# Patient Record
Sex: MALE | Race: BLACK OR AFRICAN AMERICAN | Employment: FULL TIME | URBAN - METROPOLITAN AREA
[De-identification: names, ages, dates, MRNs, and addresses within clinical notes are randomized per-mention and may not be internally consistent; named-entity substitution may affect disease eponyms.]

---

## 2019-04-06 ENCOUNTER — HOSPITAL ENCOUNTER (EMERGENCY)
Facility: HOSPITAL | Age: 54
Discharge: HOME | End: 2019-04-06
Attending: EMERGENCY MEDICINE
Payer: COMMERCIAL

## 2019-04-06 VITALS
SYSTOLIC BLOOD PRESSURE: 169 MMHG | HEIGHT: 73 IN | RESPIRATION RATE: 19 BRPM | HEART RATE: 79 BPM | OXYGEN SATURATION: 99 % | DIASTOLIC BLOOD PRESSURE: 105 MMHG | BODY MASS INDEX: 35.78 KG/M2 | TEMPERATURE: 98 F | WEIGHT: 270 LBS

## 2019-04-06 DIAGNOSIS — H11.31 SUBCONJUNCTIVAL HEMORRHAGE OF RIGHT EYE: Primary | ICD-10-CM

## 2019-04-06 DIAGNOSIS — I10 UNCONTROLLED HYPERTENSION: ICD-10-CM

## 2019-04-06 PROCEDURE — 93010 ELECTROCARDIOGRAM REPORT: CPT | Performed by: EMERGENCY MEDICINE

## 2019-04-06 PROCEDURE — 85025 COMPLETE CBC W/AUTO DIFF WBC: CPT | Performed by: EMERGENCY MEDICINE

## 2019-04-06 PROCEDURE — 80048 BASIC METABOLIC PNL TOTAL CA: CPT | Performed by: EMERGENCY MEDICINE

## 2019-04-06 PROCEDURE — 99284 EMERGENCY DEPT VISIT MOD MDM: CPT

## 2019-04-06 PROCEDURE — 96375 TX/PRO/DX INJ NEW DRUG ADDON: CPT

## 2019-04-06 PROCEDURE — 93005 ELECTROCARDIOGRAM TRACING: CPT

## 2019-04-06 PROCEDURE — 96374 THER/PROPH/DIAG INJ IV PUSH: CPT

## 2019-04-06 RX ORDER — LABETALOL HYDROCHLORIDE 5 MG/ML
20 INJECTION, SOLUTION INTRAVENOUS ONCE
Status: COMPLETED | OUTPATIENT
Start: 2019-04-06 | End: 2019-04-06

## 2019-04-06 RX ORDER — LISINOPRIL 40 MG/1
40 TABLET ORAL DAILY
Status: ON HOLD | COMMUNITY
End: 2020-02-14

## 2019-04-06 RX ORDER — HYDRALAZINE HYDROCHLORIDE 20 MG/ML
20 INJECTION INTRAMUSCULAR; INTRAVENOUS ONCE
Status: COMPLETED | OUTPATIENT
Start: 2019-04-06 | End: 2019-04-06

## 2019-04-06 RX ORDER — HYDROCHLOROTHIAZIDE 25 MG/1
25 TABLET ORAL DAILY
Qty: 30 TABLET | Refills: 0 | Status: ON HOLD | OUTPATIENT
Start: 2019-04-06 | End: 2019-12-02

## 2019-04-06 NOTE — ED PROVIDER NOTES
Patient Seen in: [facility] AND [facility] Emergency Department    History   Patient presents with: Eye Visual Problem (opthalmic)    Stated Complaint: R eye is red     HPI    The patient is a 12-year-old male who presents to redness to his right eye.   He denie Mouth/Throat: Oropharynx is clear and moist.   Eyes: Pupils are equal, round, and reactive to light. EOM and lids are normal. Right eye exhibits no chemosis. Right conjunctiva has a hemorrhage. Neck: Normal range of motion. Neck supple. No JVD present. DIFFERENTIAL[[id number]]          Abnormal            Final result                 Please view results for these tests on the individual orders.    RAINBOW DRAW BLUE   RAINBOW DRAW LAVENDER   RAINBOW DRAW LIGHT GREEN   RAINBOW DRAW GOLD     EKG    Rate, inte

## 2019-04-06 NOTE — ED NOTES
Patient came in today with right eye bloodshot. Patients blood pressure elevated. Patient stated he was upset yesterday but his eye was fine, and woke up this morning with being blood shot.  Patient prescribed lisinopril, but states he does not take it regu

## 2019-11-29 ENCOUNTER — APPOINTMENT (OUTPATIENT)
Dept: GENERAL RADIOLOGY | Facility: HOSPITAL | Age: 54
DRG: 304 | End: 2019-11-29
Attending: EMERGENCY MEDICINE
Payer: COMMERCIAL

## 2019-11-29 ENCOUNTER — HOSPITAL ENCOUNTER (INPATIENT)
Facility: HOSPITAL | Age: 54
LOS: 3 days | Discharge: HOME | DRG: 304 | End: 2019-12-02
Attending: EMERGENCY MEDICINE | Admitting: HOSPITALIST
Payer: COMMERCIAL

## 2019-11-29 ENCOUNTER — APPOINTMENT (OUTPATIENT)
Dept: CT IMAGING | Facility: HOSPITAL | Age: 54
DRG: 304 | End: 2019-11-29
Attending: EMERGENCY MEDICINE
Payer: COMMERCIAL

## 2019-11-29 ENCOUNTER — APPOINTMENT (OUTPATIENT)
Dept: ULTRASOUND IMAGING | Facility: HOSPITAL | Age: 54
DRG: 304 | End: 2019-11-29
Attending: Other
Payer: COMMERCIAL

## 2019-11-29 ENCOUNTER — APPOINTMENT (OUTPATIENT)
Dept: CV DIAGNOSTICS | Facility: HOSPITAL | Age: 54
DRG: 304 | End: 2019-11-29
Attending: Other
Payer: COMMERCIAL

## 2019-11-29 ENCOUNTER — APPOINTMENT (OUTPATIENT)
Dept: MRI IMAGING | Facility: HOSPITAL | Age: 54
DRG: 304 | End: 2019-11-29
Attending: EMERGENCY MEDICINE
Payer: COMMERCIAL

## 2019-11-29 DIAGNOSIS — I63.9 ACUTE CVA (CEREBROVASCULAR ACCIDENT) (HCC): Primary | ICD-10-CM

## 2019-11-29 DIAGNOSIS — I16.1 HYPERTENSIVE EMERGENCY: ICD-10-CM

## 2019-11-29 DIAGNOSIS — I21.4 NON-STEMI (NON-ST ELEVATED MYOCARDIAL INFARCTION) (HCC): ICD-10-CM

## 2019-11-29 PROBLEM — R73.9 HYPERGLYCEMIA: Status: ACTIVE | Noted: 2019-11-29

## 2019-11-29 PROBLEM — D69.6 THROMBOCYTOPENIA (HCC): Status: ACTIVE | Noted: 2019-11-29

## 2019-11-29 PROCEDURE — 71260 CT THORAX DX C+: CPT | Performed by: EMERGENCY MEDICINE

## 2019-11-29 PROCEDURE — 93880 EXTRACRANIAL BILAT STUDY: CPT | Performed by: OTHER

## 2019-11-29 PROCEDURE — 93306 TTE W/DOPPLER COMPLETE: CPT | Performed by: OTHER

## 2019-11-29 PROCEDURE — 71045 X-RAY EXAM CHEST 1 VIEW: CPT | Performed by: EMERGENCY MEDICINE

## 2019-11-29 PROCEDURE — 70450 CT HEAD/BRAIN W/O DYE: CPT | Performed by: EMERGENCY MEDICINE

## 2019-11-29 PROCEDURE — 99223 1ST HOSP IP/OBS HIGH 75: CPT | Performed by: HOSPITALIST

## 2019-11-29 PROCEDURE — 99253 IP/OBS CNSLTJ NEW/EST LOW 45: CPT | Performed by: OTHER

## 2019-11-29 PROCEDURE — 70551 MRI BRAIN STEM W/O DYE: CPT | Performed by: EMERGENCY MEDICINE

## 2019-11-29 RX ORDER — HEPARIN SODIUM 5000 [USP'U]/ML
5000 INJECTION, SOLUTION INTRAVENOUS; SUBCUTANEOUS EVERY 12 HOURS SCHEDULED
Status: DISCONTINUED | OUTPATIENT
Start: 2019-11-29 | End: 2019-12-02

## 2019-11-29 RX ORDER — NITROGLYCERIN 0.4 MG/1
0.4 TABLET SUBLINGUAL EVERY 5 MIN PRN
Status: DISCONTINUED | OUTPATIENT
Start: 2019-11-29 | End: 2019-12-02

## 2019-11-29 RX ORDER — SENNOSIDES 8.6 MG
17.2 TABLET ORAL NIGHTLY
Status: DISCONTINUED | OUTPATIENT
Start: 2019-11-29 | End: 2019-11-29

## 2019-11-29 RX ORDER — METOCLOPRAMIDE HYDROCHLORIDE 5 MG/ML
10 INJECTION INTRAMUSCULAR; INTRAVENOUS EVERY 8 HOURS PRN
Status: DISCONTINUED | OUTPATIENT
Start: 2019-11-29 | End: 2019-12-02

## 2019-11-29 RX ORDER — SODIUM CHLORIDE 9 MG/ML
INJECTION, SOLUTION INTRAVENOUS CONTINUOUS
Status: DISCONTINUED | OUTPATIENT
Start: 2019-11-29 | End: 2019-11-29

## 2019-11-29 RX ORDER — ACETAMINOPHEN 650 MG/1
650 SUPPOSITORY RECTAL EVERY 4 HOURS PRN
Status: DISCONTINUED | OUTPATIENT
Start: 2019-11-29 | End: 2019-12-02

## 2019-11-29 RX ORDER — ONDANSETRON 2 MG/ML
4 INJECTION INTRAMUSCULAR; INTRAVENOUS EVERY 6 HOURS PRN
Status: DISCONTINUED | OUTPATIENT
Start: 2019-11-29 | End: 2019-12-02

## 2019-11-29 RX ORDER — HYDRALAZINE HYDROCHLORIDE 20 MG/ML
5 INJECTION INTRAMUSCULAR; INTRAVENOUS ONCE
Status: COMPLETED | OUTPATIENT
Start: 2019-11-29 | End: 2019-11-29

## 2019-11-29 RX ORDER — SODIUM CHLORIDE 0.9 % (FLUSH) 0.9 %
3 SYRINGE (ML) INJECTION AS NEEDED
Status: DISCONTINUED | OUTPATIENT
Start: 2019-11-29 | End: 2019-12-02

## 2019-11-29 RX ORDER — HYDROCODONE BITARTRATE AND ACETAMINOPHEN 5; 325 MG/1; MG/1
2 TABLET ORAL EVERY 4 HOURS PRN
Status: DISCONTINUED | OUTPATIENT
Start: 2019-11-29 | End: 2019-12-02

## 2019-11-29 RX ORDER — ASPIRIN 81 MG/1
81 TABLET, CHEWABLE ORAL DAILY
Status: DISCONTINUED | OUTPATIENT
Start: 2019-11-29 | End: 2019-12-02

## 2019-11-29 RX ORDER — LABETALOL 100 MG/1
100 TABLET, FILM COATED ORAL EVERY 12 HOURS SCHEDULED
Status: DISCONTINUED | OUTPATIENT
Start: 2019-11-29 | End: 2019-11-30

## 2019-11-29 RX ORDER — LABETALOL HYDROCHLORIDE 5 MG/ML
10 INJECTION, SOLUTION INTRAVENOUS EVERY 10 MIN PRN
Status: COMPLETED | OUTPATIENT
Start: 2019-11-29 | End: 2019-11-29

## 2019-11-29 RX ORDER — CLOPIDOGREL BISULFATE 75 MG/1
75 TABLET ORAL DAILY
Status: DISCONTINUED | OUTPATIENT
Start: 2019-11-29 | End: 2019-12-02

## 2019-11-29 RX ORDER — LISINOPRIL 40 MG/1
40 TABLET ORAL DAILY
Status: DISCONTINUED | OUTPATIENT
Start: 2019-11-29 | End: 2019-12-02

## 2019-11-29 RX ORDER — LISINOPRIL 40 MG/1
40 TABLET ORAL DAILY
Status: DISCONTINUED | OUTPATIENT
Start: 2019-11-29 | End: 2019-11-29

## 2019-11-29 RX ORDER — ACETAMINOPHEN 325 MG/1
650 TABLET ORAL EVERY 4 HOURS PRN
Status: DISCONTINUED | OUTPATIENT
Start: 2019-11-29 | End: 2019-12-02

## 2019-11-29 RX ORDER — NITROGLYCERIN 0.4 MG/1
TABLET SUBLINGUAL
Status: COMPLETED
Start: 2019-11-29 | End: 2019-11-29

## 2019-11-29 RX ORDER — ASPIRIN 81 MG/1
324 TABLET, CHEWABLE ORAL ONCE
Status: COMPLETED | OUTPATIENT
Start: 2019-11-29 | End: 2019-11-29

## 2019-11-29 RX ORDER — METOPROLOL TARTRATE 5 MG/5ML
5 INJECTION INTRAVENOUS EVERY 6 HOURS
Status: DISCONTINUED | OUTPATIENT
Start: 2019-11-29 | End: 2019-11-29

## 2019-11-29 RX ORDER — HYDRALAZINE HYDROCHLORIDE 20 MG/ML
10 INJECTION INTRAMUSCULAR; INTRAVENOUS EVERY 4 HOURS PRN
Status: DISCONTINUED | OUTPATIENT
Start: 2019-11-29 | End: 2019-12-02

## 2019-11-29 RX ORDER — HYDROCODONE BITARTRATE AND ACETAMINOPHEN 5; 325 MG/1; MG/1
1 TABLET ORAL EVERY 4 HOURS PRN
Status: DISCONTINUED | OUTPATIENT
Start: 2019-11-29 | End: 2019-12-02

## 2019-11-29 RX ORDER — ATORVASTATIN CALCIUM 40 MG/1
40 TABLET, FILM COATED ORAL NIGHTLY
Status: DISCONTINUED | OUTPATIENT
Start: 2019-11-29 | End: 2019-12-02

## 2019-11-29 NOTE — CONSULTS
[facility]    Report of Consultation    Elina James Patient Status:  Emergency    3/8/1965 MRN [id number]   Location [address] Attending Riki Brannon MD   Hosp Day # 0 PCP No primary care provider on file and 160/137. Patient normally was not checking his blood pressures at home. The patient generally does not have chest pain or shortness of breath and these are transient symptoms that did resolve. EKG showed no acute findings.   Troponin is borderline el BS-present. Extremities:  Without clubbing, cyanosis or edema. Peripheral pulses are 2+. Neurologic:  Alert and oriented, normal affect. No motor or coordinational deficit. Skin:  Warm and dry.      Results:     Laboratory Data:        Lab Results   Com for patient's reported age. Therefore, correlate for underlying risk factors such as hypertension, dyslipidemia, or diabetes. Alternatively, consider vasculitis. 3. Punctate chronic microhemorrhage in the left periatrial white matter.  4. Lesser incidenta

## 2019-11-29 NOTE — ED INITIAL ASSESSMENT (HPI)
Pt reports tingling to R side of body and dyspnea with exertion that began last night. Also reports CP.  Recent flight from [facility]

## 2019-11-29 NOTE — SLP NOTE
SLP orders received and acknowledged. RN reports pt passed swallow screen and denies any swallowing difficulties. Per patient interview, pt denies  any difficulties swallowing and refuses swallow evaluation at this time.  Pt states, \"Scoot, scoot out of he

## 2019-11-29 NOTE — ED PROVIDER NOTES
Patient Seen in: [facility] AND [facility] Emergency Department      History   Patient presents with:  Numbness Weakness (neurologic)  Chest Pain Angina (cardiovascular)  Dyspnea MAUREEN SOB (respiratory)    Stated Complaint: tingling to right side/SOB    HPI    Thor Nur Musculoskeletal: Normal range of motion and neck supple. Cardiovascular:      Rate and Rhythm: Normal rate and regular rhythm. Heart sounds: No murmur. Pulmonary:      Effort: Pulmonary effort is normal. No respiratory distress.       Breath sounds following orders were created for panel order CBC WITH DIFFERENTIAL WITH PLATELET.   Procedure                               Abnormality         Status                     ---------                               -----------         ------ the left parietal white matter/corona radiata. 2. Advanced presumed sequelae of chronic microangiopathy involving both cerebral hemispheres with additional chronic lacunar infarcts involving the bianca and right greater than left cerebellum.   These findings List                   Present on Admission           ICD-10-CM Noted POA    Acute CVA (cerebrovascular accident) ([facility] [address] I63.9 11/29/2019 Unknown    Hyperglycemia R73.9 11/29/2019 Yes    Thrombocyt[facility] D69.6 11/29/2019 Yes

## 2019-11-29 NOTE — CONSULTS
[facility] HOSP - [facility]    Report of Consultation    Matt Palacio Patient Status:  Emergency    3/8/1965 MRN [id number]   Location [address] Attending Trevor Larson MD   Hosp Day # 0 PCP No primary care provider on file smell preserved, normal glutition  Neck- No masses or adenopathy  Cv: pulses were palpable and normal, no cyanosis or edema     Neurological:     Mental Status- Alert and oriented x3.   Normal attention span and concentration  Thought process intact  Memory expected for patient's age. Moderate-sized lacunar infarct within the bianca and within the right cerebellar hemisphere. Small bilateral remote appearing basal ganglia lacunar infarcts. Otherwise, no acute intracranial abnormality.   If there is high clini Dictated by (CST): Margot Pastrana MD on 11/29/2019 at 9:26     Approved by (CST): Margot Pastrana MD on 11/29/2019 at 9:33          Ekg 12-lead    Result Date: 11/29/2019  ECG Report  Interpretation  --------------------------     Ekg 12-lead    Result Da

## 2019-11-29 NOTE — CM/SW NOTE
SW received MDO for home health evaluation. Per chart review, pt is from home with spouse, is independent and works full-time. SW attempted to meet with patient 2x, pt in with ultrasound (2x).  SW placed tentative referral with [facility] - if pt requires

## 2019-11-30 PROCEDURE — 99232 SBSQ HOSP IP/OBS MODERATE 35: CPT | Performed by: OTHER

## 2019-11-30 PROCEDURE — 99233 SBSQ HOSP IP/OBS HIGH 50: CPT | Performed by: HOSPITALIST

## 2019-11-30 RX ORDER — POTASSIUM CHLORIDE 20 MEQ/1
40 TABLET, EXTENDED RELEASE ORAL EVERY 4 HOURS
Status: COMPLETED | OUTPATIENT
Start: 2019-11-30 | End: 2019-11-30

## 2019-11-30 RX ORDER — CLONIDINE HYDROCHLORIDE 0.1 MG/1
0.1 TABLET ORAL 3 TIMES DAILY
Status: DISCONTINUED | OUTPATIENT
Start: 2019-11-30 | End: 2019-12-01

## 2019-11-30 RX ORDER — LABETALOL 200 MG/1
200 TABLET, FILM COATED ORAL EVERY 12 HOURS SCHEDULED
Status: DISCONTINUED | OUTPATIENT
Start: 2019-11-30 | End: 2019-12-02

## 2019-11-30 RX ORDER — AMLODIPINE BESYLATE 5 MG/1
5 TABLET ORAL DAILY
Status: DISCONTINUED | OUTPATIENT
Start: 2019-11-30 | End: 2019-12-02

## 2019-11-30 NOTE — PLAN OF CARE
VS stable. Neuro intact, no deficits observed other than pt reports some mild numbness and tingling to the corner of right side of mouth, right fingers, and right toes. Pt ambulates independently and completes ADLs independently.   HA and CP after adminis type and severity of pain and evaluate response  - Implement non-pharmacological measures as appropriate and evaluate response  - Consider cultural and social influences on pain and pain management  - Manage/alleviate anxiety  - Utilize distraction and/or and heart rate control medications as ordered  - Initiate emergency measures for life threatening arrhythmias  - Monitor electrolytes and administer replacement therapy as ordered  Outcome: Progressing     Problem: METABOLIC/FLUID AND ELECTROLYTES - ADULT pressure  - Maintain blood pressure and fluid volume within ordered parameters to optimize cerebral perfusion and minimize risk of hemorrhage  - Monitor temperature, glucose, and sodium.  Initiate appropriate interventions as ordered  Outcome: Progressing

## 2019-11-30 NOTE — SLP NOTE
ADULT SWALLOWING EVALUATION    ASSESSMENT    ASSESSMENT/OVERALL IMPRESSION:      This BSE was ordered d/t stroke protocol. No PMH of dysphagia at [facility].        Pt alert, on room air, afebrile and assessed sitting upright in chair (after consulting with RN) - Liquid: Thin       Compensatory Strategies Recommended: No straws  Aspiration Precautions: Upright position; Slow rate;Small bites and sips; No straw  Medication Administration Recommendations: One pill at a time  Treatment Plan/Recommendations: (dysphagia Trialed: Thin liquids; Hard solid  Method of Presentation: Self presentation;Cup;Straw  Patient Positioning: Upright;Midline;Standard chair    Oral Phase of Swallow:  Within Functional Limits    Pharyngeal Phase of Swallow: Impaired  Laryngeal Elevation Rudolph

## 2019-11-30 NOTE — PROGRESS NOTES
Neurology Inpatient Follow-up Note      HPI:     Patient being seen in follow up. Interval notes and workup reviewed. Currently denying any neurologic symptoms such as numbness, weakness, vision change, or headache.       Past Medical Hisotory:  Reviewe vasculitis. 3. Punctate chronic microhemorrhage in the left periatrial white matter.     TTE  Study Conclusions  1. Left ventricle: The cavity size was normal. Wall thickness was     increased increased in a pattern of mild to moderate LVH.

## 2019-11-30 NOTE — PROGRESS NOTES
[facility]  Cardiology Progress Note    Elina James Patient Status:  Inpatient    3/8/1965 MRN [id number]   Location [facility] 2W/SW Attending Riki Brannon MD   Hosp Day # 1 PCP No primary care provider on file.        Subjective: Doin 0.059* 0.070* 0.079* 0.059*       Allergies:   No Known Allergies    Medications:  Potassium Chloride ER (K-DUR M20) CR tab 40 mEq, 40 mEq, Oral, Q4H  Labetalol HCl (NORMODYNE) tab 200 mg, 200 mg, Oral, 2 times per day  acetaminophen (TYLENOL) tab 650 mg,

## 2019-11-30 NOTE — PHYSICAL THERAPY NOTE
PHYSICAL THERAPY EVALUATION - INPATIENT     Room Number: 208/208-A  Evaluation Date: 11/30/2019  Type of Evaluation: Initial   Physician Order: PT Eval and Treat    Presenting Problem: admitted with c/o R arm and leg tingling, CP, and dyspnea  Reason for EXAMINATION     OBJECTIVE     Fall Risk: Standard fall risk    WEIGHT BEARING RESTRICTION  Weight Bearing Restriction: None                PAIN ASSESSMENT  Ratin          COGNITION  · Confident in his abilities and did not understand need for PT/OT.   E

## 2019-11-30 NOTE — H&P
[facility] Patient Status:  Inpatient    3/8/1965 MRN [id number]   Location [facility] 2W/SW Attending Riaz Garcia MD   Hosp Day # 0 PCP No primary care provider on file.      Date:   negative  Cardiovascular: chest pain   Gastrointestinal: negative  Genitourinary:negative  Musculoskeletal:negative  Neurological: negative  Behavioral/Psych: negative  Endocrine: negative    Physical Exam:   Vital Signs:  Blood pressure 151/89, pulse 85, HCT 44.0   MCV 83.0   MCH 27.2   MCHC 32.7   RDW 13.8   NEPRELIM 2.30   WBC 4.3   .0*       Recent Labs   Lab 11/29/19  0745 11/29/19  1013   * 84   BUN 23* 21*   CREATSERUM 1.62* 1.48*   GFRAA 54* 60   GFRNAA 47* 52*   CA 8.8 8.4*   ALB  - Sequence (jmz=[id number])    Result Date: 11/29/2019  CONCLUSION:  1. Small 7 mm acute or early subacute lacunar type infarct involving the left parietal white matter/corona radiata.  2. Advanced presumed sequelae of chronic microangiopathy involving both cerebr - allow permissive htn but not higher then 220/120      Non-STEMI (non-ST elevated myocardial infarction) ([facility] [address]  - Cards on board  - continue current meds  - follow troponin  - discussed case with them      Hypertensive emergency  - IV labetalol PRN  - I

## 2019-11-30 NOTE — PROGRESS NOTES
[facility]    Progress Note    Agustina George Patient Status:  Inpatient    3/8/1965 MRN [id number]   Location [facility] 2W/SW Attending Javed Becker MD   Hosp Day # 1 PCP No primary care provider on file. Subjective:    Con Tere acute ischemia, MRI brain should be obtained.     Dictated by (CST): Christine Rubio MD on 11/29/2019 at 8:10     Approved by (CST): Christine Rubio MD on 11/29/2019 at 8:14           Carotid Doppler Bilat - Diag Img (cpt=93880)    Result Date: 11/29/2019  CONC Dictated by (CST): Jeff Adams MD on 11/29/2019 at 9:26     Approved by (CST): Jeff Adams MD on 11/29/2019 at 9:33          Ekg 12-lead    Result Date: 11/29/2019  ECG Report  Interpretation  -------------------------- Sinus Tachycardia -Short HI infarction) ([facility] [address]  - Cards on board  - continue current meds  - follow troponin  - discussed case with them       Hypertensive emergency  - Stop hydralazine  - now on labetalol 200 BID   - amlodipine 5 mg X1 now- discussed with Cards   - lisinopril 40 mg d

## 2019-11-30 NOTE — OCCUPATIONAL THERAPY NOTE
OCCUPATIONAL THERAPY EVALUATION - INPATIENT     Room Number: 208/208-A  Evaluation Date: 11/30/2019  Type of Evaluation: Initial  Presenting Problem: CVA    Physician Order: IP Consult to Occupational Therapy  Reason for Therapy: ADL/IADL Dysfunction and D [facility])  Active Problems:     Thrombocytopenia ([facility] [address]    Hyperglycemia    Non-STEMI (non-ST elevated myocardial infarction) [facility])    Hypertensive emergency      Past Medical History  Past Medical History:   Diagnosis Date   • Essential hypertension        Past Bathing (including washing, rinsing, drying)?: None  -   Toileting, which includes using toilet, bedpan or urinal? : None  -   Putting on and taking off regular upper body clothing?: None  -   Taking care of personal grooming such as brushing teeth?: None

## 2019-11-30 NOTE — PLAN OF CARE
Problem: Patient Centered Care  Goal: Patient preferences are identified and integrated in the patient's plan of care  Description  Interventions:  - What would you like us to know as we care for you?   - Provide timely, complete, and accurate informatio positive troponins. prn meds, given labetolol, lisinapirl sched. Labetolol 100 mg sched, prn hydralazine,  1800 b/p 151/ 89.   Up to bathrm with min assist.  Pt had chest pain, sub ling nitro given, ekg done, dr Amandeep Biggs at bedside, updated asher of this too

## 2019-12-01 PROCEDURE — 99233 SBSQ HOSP IP/OBS HIGH 50: CPT | Performed by: HOSPITALIST

## 2019-12-01 RX ORDER — CLONIDINE HYDROCHLORIDE 0.2 MG/1
0.2 TABLET ORAL 3 TIMES DAILY
Status: DISCONTINUED | OUTPATIENT
Start: 2019-12-01 | End: 2019-12-02

## 2019-12-01 NOTE — PROGRESS NOTES
[facility]    Progress Note    Diannemartha Garcia Patient Status:  Inpatient    3/8/1965 MRN [id number]   Location [facility] 2W/SW Attending Farideh Gray MD   Hosp Day # 2 PCP No primary care provider on file. Subjective:    Westley Bowman should be obtained.     Dictated by (CST): Dayne Aschoff, MD on 11/29/2019 at 8:10     Approved by (CST): Dayne Aschoff, MD on 11/29/2019 at 8:14          Us Carotid Doppler Bilat - Diag Img (cpt=93880)    Result Date: 11/29/2019  CONCLUSION:  1. 0-49% stenosis Lory Lee MD on 11/29/2019 at 9:26     Approved by (CST): Lory Lee MD on 11/29/2019 at 9:33          Ekg 12-lead    Result Date: 11/29/2019  ECG Report  Interpretation  -------------------------- Sinus Tachycardia -Short [place] syndrome Ross = 90 P

## 2019-12-01 NOTE — PLAN OF CARE
Seen by md's & updated, reviewed poc w/pt. & htn, stroke, meds,hoped for d/c but no gxt today r/t bp high. Pt. V/u, reeval. Tomorrow for gxt. Overall feels better. Cont. To monitor.   Problem: Patient Centered Care  Goal: Patient preferences are identified as appropriate  - Consider OT/PT consult to assist with strengthening/mobility  - Encourage toileting schedule  Outcome: Progressing     Problem: CARDIOVASCULAR - ADULT  Goal: Maintains optimal cardiac output and hemodynamic stability  Description  INTERVE results as appropriate  - Fluid restriction as ordered  - Instruct patient on fluid and nutrition restrictions as appropriate  Outcome: Progressing  Goal: Hemodynamic stability and optimal renal function maintained  Description  INTERVENTIONS:  - Monitor l in neurological status  - Initiate measures to prevent increased intracranial pressure  - Maintain blood pressure and fluid volume within ordered parameters to optimize cerebral perfusion and minimize risk of hemorrhage  - Monitor temperature, glucose, and

## 2019-12-01 NOTE — PROGRESS NOTES
[facility]  Cardiology Progress Note    Job Brayan Patient Status:  Inpatient    3/8/1965 MRN [id number]   Location [facility] 2W/ Attending Angie Alex MD   Hosp Day # 2 PCP No primary care provider on file.        Subjective: Doin 0.070* 0.079* 0.059*       Allergies:   No Known Allergies    Medications:  Labetalol HCl (NORMODYNE) tab 200 mg, 200 mg, Oral, 2 times per day  amLODIPine Besylate (NORVASC) tab 5 mg, 5 mg, Oral, Daily  cloNIDine HCl (CATAPRES) tab 0.1 mg, 0.1 mg, Oral, T

## 2019-12-01 NOTE — PLAN OF CARE
Problem: Patient Centered Care  Goal: Patient preferences are identified and integrated in the patient's plan of care  Description  Interventions:  - What would you like us to know as we care for you?   - Provide timely, complete, and accurate informatio [place] fall precautions as indicated by assessment.  - Educate pt/family on patient safety including physical limitations  - Instruct pt to call for assistance with activity based on assessment  - Modify environment to reduce risk of injury  - Provide a within normal limits  Description  INTERVENTIONS:  - Monitor labs and rhythm and assess patient for signs and symptoms of electrolyte imbalances  - Administer electrolyte replacement as ordered  - Monitor response to electrolyte replacements, including rhy practices  - Implement preventative oral hygiene regimen  - Implement oral medicated treatments as ordered  Outcome: Progressing     Problem: NEUROLOGICAL - ADULT  Goal: Achieves stable or improved neurological status  Description  INTERVENTIONS  - Assess

## 2019-12-02 ENCOUNTER — APPOINTMENT (OUTPATIENT)
Dept: NUCLEAR MEDICINE | Facility: HOSPITAL | Age: 54
DRG: 304 | End: 2019-12-02
Attending: INTERNAL MEDICINE
Payer: COMMERCIAL

## 2019-12-02 ENCOUNTER — APPOINTMENT (OUTPATIENT)
Dept: CV DIAGNOSTICS | Facility: HOSPITAL | Age: 54
DRG: 304 | End: 2019-12-02
Attending: INTERNAL MEDICINE
Payer: COMMERCIAL

## 2019-12-02 VITALS
OXYGEN SATURATION: 98 % | TEMPERATURE: 97 F | DIASTOLIC BLOOD PRESSURE: 65 MMHG | HEIGHT: 73 IN | RESPIRATION RATE: 19 BRPM | WEIGHT: 280 LBS | SYSTOLIC BLOOD PRESSURE: 124 MMHG | BODY MASS INDEX: 37.11 KG/M2 | HEART RATE: 85 BPM

## 2019-12-02 PROCEDURE — 78452 HT MUSCLE IMAGE SPECT MULT: CPT | Performed by: INTERNAL MEDICINE

## 2019-12-02 PROCEDURE — 93017 CV STRESS TEST TRACING ONLY: CPT | Performed by: INTERNAL MEDICINE

## 2019-12-02 PROCEDURE — 99239 HOSP IP/OBS DSCHRG MGMT >30: CPT | Performed by: HOSPITALIST

## 2019-12-02 RX ORDER — ATORVASTATIN CALCIUM 40 MG/1
40 TABLET, FILM COATED ORAL NIGHTLY
Qty: 30 TABLET | Refills: 0 | Status: ON HOLD | OUTPATIENT
Start: 2019-12-02 | End: 2020-02-14

## 2019-12-02 RX ORDER — CLOPIDOGREL BISULFATE 75 MG/1
75 TABLET ORAL DAILY
Qty: 30 TABLET | Refills: 0 | Status: ON HOLD | OUTPATIENT
Start: 2019-12-03 | End: 2020-02-14

## 2019-12-02 RX ORDER — AMLODIPINE BESYLATE 5 MG/1
5 TABLET ORAL DAILY
Qty: 30 TABLET | Refills: 0 | Status: ON HOLD | OUTPATIENT
Start: 2019-12-03 | End: 2020-02-14

## 2019-12-02 RX ORDER — CLONIDINE HYDROCHLORIDE 0.2 MG/1
0.2 TABLET ORAL 3 TIMES DAILY
Qty: 90 TABLET | Refills: 0 | Status: ON HOLD | OUTPATIENT
Start: 2019-12-02 | End: 2020-02-14

## 2019-12-02 RX ORDER — LABETALOL 200 MG/1
200 TABLET, FILM COATED ORAL EVERY 12 HOURS SCHEDULED
Qty: 60 TABLET | Refills: 0 | Status: ON HOLD | OUTPATIENT
Start: 2019-12-02 | End: 2020-02-14

## 2019-12-02 RX ORDER — ASPIRIN 81 MG/1
81 TABLET, CHEWABLE ORAL DAILY
Qty: 30 TABLET | Refills: 0 | Status: ON HOLD | OUTPATIENT
Start: 2019-12-03 | End: 2020-02-14

## 2019-12-02 NOTE — PLAN OF CARE
VS stable. Denies pain. Pt would like to be discharged today.     Problem: Patient Centered Care  Goal: Patient preferences are identified and integrated in the patient's plan of care  Description  Interventions:  - What would you like us to know as we care administer replacement therapy as ordered  Outcome: Progressing     Problem: METABOLIC/FLUID AND ELECTROLYTES - ADULT  Goal: Hemodynamic stability and optimal renal function maintained  Description  INTERVENTIONS:  - Monitor labs and assess for signs and s assistive devices as appropriate  - Consider OT/PT consult to assist with strengthening/mobility  - Encourage toileting schedule  Outcome: Adequate for Discharge     Problem: METABOLIC/FLUID AND ELECTROLYTES - ADULT  Goal: Glucose maintained within prescri for Discharge  Goal: Oral mucous membranes remain intact  Description  INTERVENTIONS  - Assess oral mucosa and hygiene practices  - Implement preventative oral hygiene regimen  - Implement oral medicated treatments as ordered  Outcome: Adequate for Dischar

## 2019-12-02 NOTE — CM/SW NOTE
Tentative referral made to [facility] 11/29 pending therapy recommendations. PT/OT recommending home, [facility] canceled. No anticipated needs.     Miugel Ortiz, [address]

## 2019-12-02 NOTE — DISCHARGE SUMMARY
[facility] - [facility]    Discharge Summary    Gracythu Lopez Patient Status:  Inpatient    3/8/1965 MRN [id number]   Location [facility] 2W/SW Attending Devi Todd MD   Hosp Day # 3 PCP No primary care provider on file.      Date of Admiss gallops. Abdomen: Soft, nontender, nondistended. Positive bowel sounds. No rebound or guarding. Neurologic: No focal neurological deficits. Musculoskeletal: Moves all extremities.     Hospital Course:   Acute CVA (cerebrovascular accident) ([facility] [address]  - co Tabs  Commonly known as:  NORVASC  Start taking on:  December 3, 2019      Take 1 tablet (5 mg total) by mouth daily.    Quantity:  30 tablet  Refills:  0     aspirin 81 MG Chew  Start taking on:  December 3, 2019      Chew 1 tablet (81 mg total) by mouth d

## 2019-12-02 NOTE — PROGRESS NOTES
[facility]    Progress Note    Katelynn Loving Patient Status:  Inpatient    3/8/1965 MRN [id number]   Location [facility] 2W/SW Attending Karolina Franklin MD   Hosp Day # 3 PCP No primary care provider on file.          Assessment and Daily   • atorvastatin  40 mg Oral Nightly             Results:     Lab Results   Component Value Date    WBC 4.2 12/02/2019    HGB 14.2 12/02/2019    HCT 42.4 12/02/2019    .0 12/02/2019    CREATSERUM 1.50 (H) 12/02/2019    BUN 19 (H) 12/02/2019

## 2019-12-02 NOTE — PLAN OF CARE
Stress testing negative - d/w patient  BP well controlled on current meds  ASA & Plavix per neurology  No further cardiac wkup        PATRIICA Leal  S Cardiology  12/02/19

## 2019-12-03 NOTE — PLAN OF CARE
Negative stress test today. Pretty Villarreal is anxious for discharge to home. Neuro symptoms intact with slight numbness to fingertips R hand, R corner of mouth and RLE.  Discharge order received and reviewed AVS, Sally Brooks information on new meds prescribed, S Reinforce stroke education/FAST   Outcome: Adequate for Discharge  Goal: Patient/Family Short Term Goal  Description  Patient's Short Term Goal: control blood pressure, lose weight, go home    Interventions:   - scheduled medications added to further contr stability  Description  INTERVENTIONS:  - Monitor vital signs, rhythm, and trends  - Monitor for bleeding, hypotension and signs of decreased cardiac output  - Evaluate effectiveness of vasoactive medications to optimize hemodynamic stability  - Monitor ar optimal renal function maintained  Description  INTERVENTIONS:  - Monitor labs and assess for signs and symptoms of volume excess or deficit  - Monitor intake, output and patient weight  - Monitor urine specific gravity, serum osmolarity and serum sodium a ordered parameters to optimize cerebral perfusion and minimize risk of hemorrhage  - Monitor temperature, glucose, and sodium.  Initiate appropriate interventions as ordered  Outcome: Adequate for Discharge  Goal: Achieves maximal functionality and self car

## 2019-12-17 ENCOUNTER — TELEPHONE (OUTPATIENT)
Dept: CARDIOLOGY UNIT | Facility: HOSPITAL | Age: 54
End: 2019-12-17

## 2020-02-12 ENCOUNTER — HOSPITAL ENCOUNTER (INPATIENT)
Facility: HOSPITAL | Age: 55
LOS: 2 days | Discharge: HOME | DRG: 247 | End: 2020-02-15
Attending: EMERGENCY MEDICINE | Admitting: HOSPITALIST
Payer: COMMERCIAL

## 2020-02-12 ENCOUNTER — APPOINTMENT (OUTPATIENT)
Dept: CT IMAGING | Facility: HOSPITAL | Age: 55
DRG: 247 | End: 2020-02-12
Attending: EMERGENCY MEDICINE
Payer: COMMERCIAL

## 2020-02-12 ENCOUNTER — APPOINTMENT (OUTPATIENT)
Dept: GENERAL RADIOLOGY | Facility: HOSPITAL | Age: 55
DRG: 247 | End: 2020-02-12
Attending: EMERGENCY MEDICINE
Payer: COMMERCIAL

## 2020-02-12 DIAGNOSIS — I21.4 NSTEMI (NON-ST ELEVATED MYOCARDIAL INFARCTION) (HCC): ICD-10-CM

## 2020-02-12 DIAGNOSIS — I16.1 HYPERTENSIVE EMERGENCY: Primary | ICD-10-CM

## 2020-02-12 DIAGNOSIS — R07.9 ACUTE CHEST PAIN: ICD-10-CM

## 2020-02-12 LAB
ANION GAP SERPL CALC-SCNC: 5 MMOL/L (ref 0–18)
APTT PPP: 26.2 SECONDS (ref 23.2–35.3)
BASOPHILS # BLD AUTO: 0.04 X10(3) UL (ref 0–0.2)
BASOPHILS NFR BLD AUTO: 0.7 %
BUN BLD-MCNC: 15 MG/DL (ref 7–18)
BUN/CREAT SERPL: 9.5 (ref 10–20)
CALCIUM BLD-MCNC: 8.6 MG/DL (ref 8.5–10.1)
CHLORIDE SERPL-SCNC: 108 MMOL/L (ref 98–112)
CHOLEST SMN-MCNC: 244 MG/DL (ref ?–200)
CO2 SERPL-SCNC: 29 MMOL/L (ref 21–32)
CREAT BLD-MCNC: 1.58 MG/DL (ref 0.7–1.3)
D DIMER PPP FEU-MCNC: 0.68 UG/ML FEU (ref ?–0.56)
DEPRECATED RDW RBC AUTO: 40 FL (ref 35.1–46.3)
EOSINOPHIL # BLD AUTO: 0.03 X10(3) UL (ref 0–0.7)
EOSINOPHIL NFR BLD AUTO: 0.6 %
ERYTHROCYTE [DISTWIDTH] IN BLOOD BY AUTOMATED COUNT: 13.6 % (ref 11–15)
GLUCOSE BLD-MCNC: 148 MG/DL (ref 70–99)
HCT VFR BLD AUTO: 45.4 % (ref 39–53)
HDLC SERPL-MCNC: 37 MG/DL (ref 40–59)
HGB BLD-MCNC: 15.3 G/DL (ref 13–17.5)
IMM GRANULOCYTES # BLD AUTO: 0.01 X10(3) UL (ref 0–1)
IMM GRANULOCYTES NFR BLD: 0.2 %
LDLC SERPL CALC-MCNC: 160 MG/DL (ref ?–100)
LYMPHOCYTES # BLD AUTO: 1.1 X10(3) UL (ref 1–4)
LYMPHOCYTES NFR BLD AUTO: 20.5 %
MCH RBC QN AUTO: 27.4 PG (ref 26–34)
MCHC RBC AUTO-ENTMCNC: 33.7 G/DL (ref 31–37)
MCV RBC AUTO: 81.4 FL (ref 80–100)
MONOCYTES # BLD AUTO: 0.55 X10(3) UL (ref 0.1–1)
MONOCYTES NFR BLD AUTO: 10.2 %
NEUTROPHILS # BLD AUTO: 3.64 X10 (3) UL (ref 1.5–7.7)
NEUTROPHILS # BLD AUTO: 3.64 X10(3) UL (ref 1.5–7.7)
NEUTROPHILS NFR BLD AUTO: 67.8 %
NONHDLC SERPL-MCNC: 207 MG/DL (ref ?–130)
NT-PROBNP SERPL-MCNC: 425 PG/ML (ref ?–125)
OSMOLALITY SERPL CALC.SUM OF ELEC: 298 MOSM/KG (ref 275–295)
PLATELET # BLD AUTO: 150 10(3)UL (ref 150–450)
POTASSIUM SERPL-SCNC: 3.7 MMOL/L (ref 3.5–5.1)
RBC # BLD AUTO: 5.58 X10(6)UL (ref 4.3–5.7)
SODIUM SERPL-SCNC: 142 MMOL/L (ref 136–145)
TRIGL SERPL-MCNC: 234 MG/DL (ref 30–149)
TROPONIN I SERPL-MCNC: 3.07 NG/ML (ref ?–0.04)
VLDLC SERPL CALC-MCNC: 47 MG/DL (ref 0–30)
WBC # BLD AUTO: 5.4 X10(3) UL (ref 4–11)

## 2020-02-12 PROCEDURE — 85025 COMPLETE CBC W/AUTO DIFF WBC: CPT | Performed by: EMERGENCY MEDICINE

## 2020-02-12 PROCEDURE — 80048 BASIC METABOLIC PNL TOTAL CA: CPT | Performed by: EMERGENCY MEDICINE

## 2020-02-12 PROCEDURE — 96365 THER/PROPH/DIAG IV INF INIT: CPT

## 2020-02-12 PROCEDURE — 85379 FIBRIN DEGRADATION QUANT: CPT | Performed by: EMERGENCY MEDICINE

## 2020-02-12 PROCEDURE — 83880 ASSAY OF NATRIURETIC PEPTIDE: CPT | Performed by: EMERGENCY MEDICINE

## 2020-02-12 PROCEDURE — 96368 THER/DIAG CONCURRENT INF: CPT

## 2020-02-12 PROCEDURE — 93005 ELECTROCARDIOGRAM TRACING: CPT

## 2020-02-12 PROCEDURE — 71260 CT THORAX DX C+: CPT | Performed by: EMERGENCY MEDICINE

## 2020-02-12 PROCEDURE — 93010 ELECTROCARDIOGRAM REPORT: CPT | Performed by: EMERGENCY MEDICINE

## 2020-02-12 PROCEDURE — 85730 THROMBOPLASTIN TIME PARTIAL: CPT | Performed by: EMERGENCY MEDICINE

## 2020-02-12 PROCEDURE — 71046 X-RAY EXAM CHEST 2 VIEWS: CPT | Performed by: EMERGENCY MEDICINE

## 2020-02-12 PROCEDURE — 96376 TX/PRO/DX INJ SAME DRUG ADON: CPT

## 2020-02-12 PROCEDURE — 96375 TX/PRO/DX INJ NEW DRUG ADDON: CPT

## 2020-02-12 PROCEDURE — 80061 LIPID PANEL: CPT | Performed by: EMERGENCY MEDICINE

## 2020-02-12 PROCEDURE — 99291 CRITICAL CARE FIRST HOUR: CPT

## 2020-02-12 PROCEDURE — 84484 ASSAY OF TROPONIN QUANT: CPT | Performed by: EMERGENCY MEDICINE

## 2020-02-12 RX ORDER — ASPIRIN 81 MG/1
324 TABLET, CHEWABLE ORAL ONCE
Status: COMPLETED | OUTPATIENT
Start: 2020-02-12 | End: 2020-02-12

## 2020-02-12 RX ORDER — NITROGLYCERIN 20 MG/100ML
INJECTION INTRAVENOUS CONTINUOUS
Status: DISCONTINUED | OUTPATIENT
Start: 2020-02-12 | End: 2020-02-13

## 2020-02-12 RX ORDER — METOPROLOL TARTRATE 5 MG/5ML
5 INJECTION INTRAVENOUS ONCE
Status: COMPLETED | OUTPATIENT
Start: 2020-02-12 | End: 2020-02-12

## 2020-02-12 RX ORDER — HEPARIN SODIUM 1000 [USP'U]/ML
5000 INJECTION, SOLUTION INTRAVENOUS; SUBCUTANEOUS ONCE
Status: COMPLETED | OUTPATIENT
Start: 2020-02-12 | End: 2020-02-13

## 2020-02-12 RX ORDER — LABETALOL HYDROCHLORIDE 5 MG/ML
10 INJECTION, SOLUTION INTRAVENOUS ONCE
Status: COMPLETED | OUTPATIENT
Start: 2020-02-12 | End: 2020-02-12

## 2020-02-12 RX ORDER — MORPHINE SULFATE 4 MG/ML
4 INJECTION, SOLUTION INTRAMUSCULAR; INTRAVENOUS ONCE
Status: COMPLETED | OUTPATIENT
Start: 2020-02-12 | End: 2020-02-12

## 2020-02-12 RX ORDER — HEPARIN SODIUM AND DEXTROSE 10000; 5 [USP'U]/100ML; G/100ML
1000 INJECTION INTRAVENOUS ONCE
Status: COMPLETED | OUTPATIENT
Start: 2020-02-12 | End: 2020-02-13

## 2020-02-12 RX ORDER — NITROGLYCERIN 0.4 MG/1
0.4 TABLET SUBLINGUAL ONCE
Status: COMPLETED | OUTPATIENT
Start: 2020-02-12 | End: 2020-02-12

## 2020-02-13 ENCOUNTER — APPOINTMENT (OUTPATIENT)
Dept: CV DIAGNOSTICS | Facility: HOSPITAL | Age: 55
DRG: 247 | End: 2020-02-13
Attending: NURSE PRACTITIONER
Payer: COMMERCIAL

## 2020-02-13 ENCOUNTER — APPOINTMENT (OUTPATIENT)
Dept: INTERVENTIONAL RADIOLOGY/VASCULAR | Facility: HOSPITAL | Age: 55
DRG: 247 | End: 2020-02-13
Attending: INTERNAL MEDICINE
Payer: COMMERCIAL

## 2020-02-13 PROBLEM — R07.9 ACUTE CHEST PAIN: Status: ACTIVE | Noted: 2020-02-13

## 2020-02-13 PROBLEM — I21.4 NSTEMI (NON-ST ELEVATED MYOCARDIAL INFARCTION) (HCC): Status: ACTIVE | Noted: 2020-02-13

## 2020-02-13 LAB
ALBUMIN SERPL-MCNC: 3.5 G/DL (ref 3.4–5)
ALBUMIN/GLOB SERPL: 0.9 {RATIO} (ref 1–2)
ALP LIVER SERPL-CCNC: 66 U/L (ref 45–117)
ALT SERPL-CCNC: 37 U/L (ref 16–61)
ANION GAP SERPL CALC-SCNC: 6 MMOL/L (ref 0–18)
AST SERPL-CCNC: 111 U/L (ref 15–37)
BASOPHILS # BLD AUTO: 0.02 X10(3) UL (ref 0–0.2)
BASOPHILS NFR BLD AUTO: 0.3 %
BILIRUB SERPL-MCNC: 0.6 MG/DL (ref 0.1–2)
BUN BLD-MCNC: 13 MG/DL (ref 7–18)
BUN/CREAT SERPL: 9.5 (ref 10–20)
CALCIUM BLD-MCNC: 8.9 MG/DL (ref 8.5–10.1)
CHLORIDE SERPL-SCNC: 108 MMOL/L (ref 98–112)
CO2 SERPL-SCNC: 26 MMOL/L (ref 21–32)
CREAT BLD-MCNC: 1.37 MG/DL (ref 0.7–1.3)
DEPRECATED RDW RBC AUTO: 39.6 FL (ref 35.1–46.3)
EOSINOPHIL # BLD AUTO: 0 X10(3) UL (ref 0–0.7)
EOSINOPHIL NFR BLD AUTO: 0 %
ERYTHROCYTE [DISTWIDTH] IN BLOOD BY AUTOMATED COUNT: 13.7 % (ref 11–15)
EST. AVERAGE GLUCOSE BLD GHB EST-MCNC: 126 MG/DL (ref 68–126)
GLOBULIN PLAS-MCNC: 3.8 G/DL (ref 2.8–4.4)
GLUCOSE BLD-MCNC: 134 MG/DL (ref 70–99)
HAV IGM SER QL: 2.4 MG/DL (ref 1.6–2.6)
HBA1C MFR BLD HPLC: 6 % (ref ?–5.7)
HCT VFR BLD AUTO: 43.6 % (ref 39–53)
HGB BLD-MCNC: 14.6 G/DL (ref 13–17.5)
IMM GRANULOCYTES # BLD AUTO: 0.03 X10(3) UL (ref 0–1)
IMM GRANULOCYTES NFR BLD: 0.5 %
LYMPHOCYTES # BLD AUTO: 0.71 X10(3) UL (ref 1–4)
LYMPHOCYTES NFR BLD AUTO: 11.6 %
M PROTEIN MFR SERPL ELPH: 7.3 G/DL (ref 6.4–8.2)
MCH RBC QN AUTO: 26.9 PG (ref 26–34)
MCHC RBC AUTO-ENTMCNC: 33.5 G/DL (ref 31–37)
MCV RBC AUTO: 80.4 FL (ref 80–100)
MONOCYTES # BLD AUTO: 0.57 X10(3) UL (ref 0.1–1)
MONOCYTES NFR BLD AUTO: 9.3 %
MRSA DNA SPEC QL NAA+PROBE: NEGATIVE
NEUTROPHILS # BLD AUTO: 4.78 X10 (3) UL (ref 1.5–7.7)
NEUTROPHILS # BLD AUTO: 4.78 X10(3) UL (ref 1.5–7.7)
NEUTROPHILS NFR BLD AUTO: 78.3 %
OSMOLALITY SERPL CALC.SUM OF ELEC: 292 MOSM/KG (ref 275–295)
PLATELET # BLD AUTO: 147 10(3)UL (ref 150–450)
POTASSIUM SERPL-SCNC: 3.8 MMOL/L (ref 3.5–5.1)
RBC # BLD AUTO: 5.42 X10(6)UL (ref 4.3–5.7)
SODIUM SERPL-SCNC: 140 MMOL/L (ref 136–145)
TROPONIN I SERPL-MCNC: 3.84 NG/ML (ref ?–0.04)
TROPONIN I SERPL-MCNC: 30.3 NG/ML (ref ?–0.04)
TROPONIN I SERPL-MCNC: 37.2 NG/ML (ref ?–0.04)
WBC # BLD AUTO: 6.1 X10(3) UL (ref 4–11)

## 2020-02-13 PROCEDURE — 02703D6 DILATION OF CORONARY ARTERY, ONE ARTERY, BIFURCATION, WITH INTRALUMINAL DEVICE, PERCUTANEOUS APPROACH: ICD-10-PCS | Performed by: INTERNAL MEDICINE

## 2020-02-13 PROCEDURE — 83036 HEMOGLOBIN GLYCOSYLATED A1C: CPT | Performed by: NURSE PRACTITIONER

## 2020-02-13 PROCEDURE — 87641 MR-STAPH DNA AMP PROBE: CPT | Performed by: INTERNAL MEDICINE

## 2020-02-13 PROCEDURE — 93005 ELECTROCARDIOGRAM TRACING: CPT

## 2020-02-13 PROCEDURE — 93306 TTE W/DOPPLER COMPLETE: CPT | Performed by: NURSE PRACTITIONER

## 2020-02-13 PROCEDURE — 0270346 DILATION OF CORONARY ARTERY, ONE ARTERY, BIFURCATION, WITH DRUG-ELUTING INTRALUMINAL DEVICE, PERCUTANEOUS APPROACH: ICD-10-PCS | Performed by: INTERNAL MEDICINE

## 2020-02-13 PROCEDURE — B2151ZZ FLUOROSCOPY OF LEFT HEART USING LOW OSMOLAR CONTRAST: ICD-10-PCS | Performed by: INTERNAL MEDICINE

## 2020-02-13 PROCEDURE — 93010 ELECTROCARDIOGRAM REPORT: CPT | Performed by: INTERNAL MEDICINE

## 2020-02-13 PROCEDURE — 83735 ASSAY OF MAGNESIUM: CPT | Performed by: NURSE PRACTITIONER

## 2020-02-13 PROCEDURE — 99153 MOD SED SAME PHYS/QHP EA: CPT

## 2020-02-13 PROCEDURE — 85025 COMPLETE CBC W/AUTO DIFF WBC: CPT | Performed by: INTERNAL MEDICINE

## 2020-02-13 PROCEDURE — 99152 MOD SED SAME PHYS/QHP 5/>YRS: CPT

## 2020-02-13 PROCEDURE — B2111ZZ FLUOROSCOPY OF MULTIPLE CORONARY ARTERIES USING LOW OSMOLAR CONTRAST: ICD-10-PCS | Performed by: INTERNAL MEDICINE

## 2020-02-13 PROCEDURE — 84484 ASSAY OF TROPONIN QUANT: CPT | Performed by: INTERNAL MEDICINE

## 2020-02-13 PROCEDURE — 92921 HC PTCA EA ADDL MAJOR CORONARY ARTERY/BRANCH: CPT

## 2020-02-13 PROCEDURE — 84484 ASSAY OF TROPONIN QUANT: CPT | Performed by: EMERGENCY MEDICINE

## 2020-02-13 PROCEDURE — 93458 L HRT ARTERY/VENTRICLE ANGIO: CPT

## 2020-02-13 PROCEDURE — 4A023N7 MEASUREMENT OF CARDIAC SAMPLING AND PRESSURE, LEFT HEART, PERCUTANEOUS APPROACH: ICD-10-PCS | Performed by: INTERNAL MEDICINE

## 2020-02-13 PROCEDURE — 80053 COMPREHEN METABOLIC PANEL: CPT | Performed by: INTERNAL MEDICINE

## 2020-02-13 RX ORDER — POTASSIUM CHLORIDE 20 MEQ/1
40 TABLET, EXTENDED RELEASE ORAL ONCE
Status: COMPLETED | OUTPATIENT
Start: 2020-02-13 | End: 2020-02-13

## 2020-02-13 RX ORDER — LABETALOL HYDROCHLORIDE 5 MG/ML
10 INJECTION, SOLUTION INTRAVENOUS ONCE
Status: COMPLETED | OUTPATIENT
Start: 2020-02-13 | End: 2020-02-13

## 2020-02-13 RX ORDER — SODIUM CHLORIDE 0.9 % (FLUSH) 0.9 %
3 SYRINGE (ML) INJECTION AS NEEDED
Status: DISCONTINUED | OUTPATIENT
Start: 2020-02-13 | End: 2020-02-15

## 2020-02-13 RX ORDER — METOCLOPRAMIDE HYDROCHLORIDE 5 MG/ML
10 INJECTION INTRAMUSCULAR; INTRAVENOUS EVERY 8 HOURS PRN
Status: DISCONTINUED | OUTPATIENT
Start: 2020-02-13 | End: 2020-02-15

## 2020-02-13 RX ORDER — ONDANSETRON 2 MG/ML
4 INJECTION INTRAMUSCULAR; INTRAVENOUS EVERY 6 HOURS PRN
Status: DISCONTINUED | OUTPATIENT
Start: 2020-02-13 | End: 2020-02-15

## 2020-02-13 RX ORDER — METOPROLOL TARTRATE 50 MG/1
50 TABLET, FILM COATED ORAL
Status: DISCONTINUED | OUTPATIENT
Start: 2020-02-13 | End: 2020-02-14

## 2020-02-13 RX ORDER — CLOPIDOGREL BISULFATE 75 MG/1
75 TABLET ORAL DAILY
Status: DISCONTINUED | OUTPATIENT
Start: 2020-02-14 | End: 2020-02-15

## 2020-02-13 RX ORDER — FUROSEMIDE 10 MG/ML
INJECTION INTRAMUSCULAR; INTRAVENOUS
Status: COMPLETED
Start: 2020-02-13 | End: 2020-02-13

## 2020-02-13 RX ORDER — AMLODIPINE BESYLATE 10 MG/1
10 TABLET ORAL DAILY
Status: DISCONTINUED | OUTPATIENT
Start: 2020-02-13 | End: 2020-02-15

## 2020-02-13 RX ORDER — HEPARIN SODIUM AND DEXTROSE 10000; 5 [USP'U]/100ML; G/100ML
INJECTION INTRAVENOUS CONTINUOUS
Status: DISCONTINUED | OUTPATIENT
Start: 2020-02-13 | End: 2020-02-13

## 2020-02-13 RX ORDER — CALCIUM CARBONATE 200(500)MG
500 TABLET,CHEWABLE ORAL 3 TIMES DAILY PRN
Status: DISCONTINUED | OUTPATIENT
Start: 2020-02-13 | End: 2020-02-15

## 2020-02-13 RX ORDER — CLOPIDOGREL BISULFATE 75 MG/1
TABLET ORAL
Status: COMPLETED
Start: 2020-02-13 | End: 2020-02-13

## 2020-02-13 RX ORDER — CLONIDINE HYDROCHLORIDE 0.1 MG/1
0.1 TABLET ORAL 3 TIMES DAILY
Status: DISCONTINUED | OUTPATIENT
Start: 2020-02-13 | End: 2020-02-15

## 2020-02-13 RX ORDER — HYDRALAZINE HYDROCHLORIDE 20 MG/ML
10 INJECTION INTRAMUSCULAR; INTRAVENOUS
Status: DISCONTINUED | OUTPATIENT
Start: 2020-02-13 | End: 2020-02-15

## 2020-02-13 RX ORDER — MIDAZOLAM HYDROCHLORIDE 1 MG/ML
INJECTION INTRAMUSCULAR; INTRAVENOUS
Status: COMPLETED
Start: 2020-02-13 | End: 2020-02-13

## 2020-02-13 RX ORDER — HYDRALAZINE HYDROCHLORIDE 20 MG/ML
INJECTION INTRAMUSCULAR; INTRAVENOUS
Status: COMPLETED
Start: 2020-02-13 | End: 2020-02-13

## 2020-02-13 RX ORDER — NITROGLYCERIN 20 MG/100ML
INJECTION INTRAVENOUS CONTINUOUS
Status: DISCONTINUED | OUTPATIENT
Start: 2020-02-13 | End: 2020-02-15

## 2020-02-13 RX ORDER — ACETAMINOPHEN 325 MG/1
650 TABLET ORAL EVERY 6 HOURS PRN
Status: DISCONTINUED | OUTPATIENT
Start: 2020-02-13 | End: 2020-02-15

## 2020-02-13 RX ORDER — PANTOPRAZOLE SODIUM 40 MG/1
40 TABLET, DELAYED RELEASE ORAL
Status: DISCONTINUED | OUTPATIENT
Start: 2020-02-13 | End: 2020-02-15

## 2020-02-13 RX ORDER — LIDOCAINE HYDROCHLORIDE 20 MG/ML
INJECTION, SOLUTION EPIDURAL; INFILTRATION; INTRACAUDAL; PERINEURAL
Status: COMPLETED
Start: 2020-02-13 | End: 2020-02-13

## 2020-02-13 RX ORDER — ATORVASTATIN CALCIUM 40 MG/1
40 TABLET, FILM COATED ORAL DAILY
Status: DISCONTINUED | OUTPATIENT
Start: 2020-02-13 | End: 2020-02-15

## 2020-02-13 RX ORDER — SODIUM CHLORIDE 9 MG/ML
INJECTION, SOLUTION INTRAVENOUS CONTINUOUS
Status: ACTIVE | OUTPATIENT
Start: 2020-02-13 | End: 2020-02-13

## 2020-02-13 RX ORDER — METOPROLOL TARTRATE 5 MG/5ML
INJECTION INTRAVENOUS
Status: COMPLETED
Start: 2020-02-13 | End: 2020-02-13

## 2020-02-13 RX ORDER — ONDANSETRON 2 MG/ML
4 INJECTION INTRAMUSCULAR; INTRAVENOUS EVERY 6 HOURS PRN
Status: DISCONTINUED | OUTPATIENT
Start: 2020-02-13 | End: 2020-02-13

## 2020-02-13 RX ORDER — MORPHINE SULFATE 4 MG/ML
4 INJECTION, SOLUTION INTRAMUSCULAR; INTRAVENOUS ONCE
Status: COMPLETED | OUTPATIENT
Start: 2020-02-13 | End: 2020-02-13

## 2020-02-13 RX ORDER — LISINOPRIL 20 MG/1
20 TABLET ORAL DAILY
Status: DISCONTINUED | OUTPATIENT
Start: 2020-02-13 | End: 2020-02-13

## 2020-02-13 RX ORDER — ASPIRIN 81 MG/1
81 TABLET ORAL DAILY
Status: DISCONTINUED | OUTPATIENT
Start: 2020-02-14 | End: 2020-02-15

## 2020-02-13 RX ORDER — CLOPIDOGREL BISULFATE 75 MG/1
300 TABLET ORAL ONCE
Status: DISCONTINUED | OUTPATIENT
Start: 2020-02-13 | End: 2020-02-15

## 2020-02-13 NOTE — PLAN OF CARE
End of shift summary: admitted with NSTEMI,; s/p LAD stent, right groin site no evidence of hematoma, swelling, or pain; nitro gtt 10 mcg/hr continued; antihypertensive oral medications started; breath sounds clear; no c/o pain; CLD advance diet as tolerat perfusion - ex.  Angina  - Evaluate fluid balance, assess for edema, trend weights  Outcome: Progressing  Goal: Absence of cardiac arrhythmias or at baseline  Description  INTERVENTIONS:  - Continuous cardiac monitoring, monitor vital signs, obtain 12 lead

## 2020-02-13 NOTE — ED INITIAL ASSESSMENT (HPI)
Pt ambulatory to triage with complaints of left sided chest pain with mild SOBthat started 1 hour before coming here. Pt denies fever, denies cough and congestion. Pain scale at 9/10.

## 2020-02-13 NOTE — CONSULTS
Pulmonary H&P/Consult     NAME: Martha Garcia - ROOM: 236/[facility] - MRN: [id number] - Age: 62year old - :  3/8/1965    Date of Admission: 2020 10:10 PM  Admission Diagnosis: Acute chest pain [R07.9]  NSTEMI (non-ST elevated myocardial infarction) (HC reviewed, negative except as stated in the HPI    OBJECTIVE:    Intake/Output Summary (Last 24 hours) at 2/13/2020 0758  Last data filed at 2/13/2020 0600  Gross per 24 hour   Intake 144 ml   Output 2250 ml   Net -2106 ml     /89 (BP Location: Right

## 2020-02-13 NOTE — CONSULTS
[facility] AND [facility]  Report of Consultation    Jael Knowles Patient Status:  Emergency    3/8/1965 MRN [id number]   Location [address] Attending Latrell Dailey MD   Hosp Day # 0 PCP No primary care provider on file.      Re BS-present. Extremities: Without clubbing, cyanosis or edema. Peripheral pulses are 2+. Neurologic: Alert and oriented, normal affect. No motor or coordinational deficit. Skin: Warm and dry.        Laboratory Data:  Lab Results   Component Value Date

## 2020-02-13 NOTE — PROCEDURES
LHC, LV, C, PTCA/KIMBERLY LAD with kissing balloon PTCA LAD/DIAG bifurcation done RFA no complicatins.      LM nl  LAD 99% prox with 90% Diagonal bifurcation stenosis  Circ- plaques  RCA 40% codominant vessel  LAD 99%- 0% 4.0x18 postdil prox with 4.5 bslloon  Saba Gao

## 2020-02-13 NOTE — PROCEDURES
[facility]    PATIENT'S NAME: Abenaon Aure   ATTENDING PHYSICIAN: Nusrat Batres MD   OPERATING PHYSICIAN: Gay Lane.  Ethan Monteiro MD   PATIENT ACCOUNT#:   [id number]    LOCATION:  [address]  MEDICAL RECORD #:   [id number]       DA deployed at high pressure. The previously placed diagonal wire was then removed and the diagonal branch was rewired and angioplasty was again performed with a 3 mm balloon through the stent struts into the diagonal branch.   A 4.5 mm x 8 mm balloon dilatio tortuous in its proximal portion with a 50% stenosis in the segment of tortuosity.   4.   Successful angioplasty and drug-eluting stent placement proximal LAD with 99% stenosis with NASREEN grade 3 distal flow, reduced to 0% residual stenosis with NASREEN grade 3

## 2020-02-13 NOTE — H&P
[facility] Hospitalist Team  History and Physical  Admit Date:  2/12/20    ASSESSMENT / PLAN:   61 yo male with hx HTN, parietal CVA 11/2019, HL who presents  With NSTEMI S/P PTCA KIMBERLY LAD with kissing balloon PTCA LAD/Diag, see below for details    Acute NSTEMI plavix or lipitor but has been taking ASA, clonidine, lisinopril and norvasc. Does not see a primary. 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         OBJECTIVE:  /89 (BP Location: Right arm)   Pulse 82   Temp 98.3 °F (36.8 °C) (Temporal)   Resp 10   Ht 6' 1\" (1 29.0 26.0   BUN 15 13   CREATSERUM 1.58* 1.37*   * 134*   CA 8.6 8.9       Recent Labs   Lab 02/13/20  0544   ALT 37   *   ALB 3.5       Recent Labs   Lab 02/12/20  2224 02/13/20  0017 02/13/20 0544   TROP 3.070* 3.840* 30.300*       Radiolo NOTE BELOW      Agree with APN note above. Mr. Jessica Ulola is a 60yo M with h/o HTN, HL, CVA 11/2019 presented overnight with SOB and chest pain. Found to have NSTEMI s/p PCl with KIMBERLY to LAD and balloon PTCA to LAD-diag bifurcation. Currently in ICU.  Now off

## 2020-02-13 NOTE — ED PROVIDER NOTES
Patient Seen in: [facility] AND [facility] Emergency Department      History   Patient presents with:  Chest Pain    Stated Complaint: Chest pain    HPI  Pt is 61 yo M who p/w sudden onset left sided nonradiating chest pressure x 1 hour.  Describes as constant wi edmar        ED Course     Labs Reviewed   BASIC METABOLIC PANEL (8) - Abnormal; Notable for the following components:       Result Value    Glucose 148 (*)     Creatinine 1.58 (*)     BUN/CREA Ratio 9.5 (*)     Calculated Osmolality 298 (*)     GFR, Non- indication chest pain, no new changes    Radiology findings:     Cxr: Central basilar predominant airspace opacities with interstitial thickening most consistent with alveolar and interstitial edema. No effusion. Superimposed pneumonia not excluded.   No

## 2020-02-13 NOTE — DIETARY NOTE
NUTRITION EDUCATION NOTE    Received consult for nutrition education per cardiac rehab order set. Appropriate education and handout(s) provided. See education section of Epic for specifics.     Mercedes Strauss RD,LDN  [id number]

## 2020-02-14 PROBLEM — R07.9 ACUTE CHEST PAIN: Status: RESOLVED | Noted: 2020-02-13 | Resolved: 2020-02-14

## 2020-02-14 LAB
ANION GAP SERPL CALC-SCNC: 7 MMOL/L (ref 0–18)
BASOPHILS # BLD AUTO: 0.03 X10(3) UL (ref 0–0.2)
BASOPHILS NFR BLD AUTO: 0.5 %
BUN BLD-MCNC: 15 MG/DL (ref 7–18)
BUN/CREAT SERPL: 11.2 (ref 10–20)
CALCIUM BLD-MCNC: 8.4 MG/DL (ref 8.5–10.1)
CHLORIDE SERPL-SCNC: 109 MMOL/L (ref 98–112)
CO2 SERPL-SCNC: 26 MMOL/L (ref 21–32)
CREAT BLD-MCNC: 1.34 MG/DL (ref 0.7–1.3)
DEPRECATED RDW RBC AUTO: 40.3 FL (ref 35.1–46.3)
EOSINOPHIL # BLD AUTO: 0.05 X10(3) UL (ref 0–0.7)
EOSINOPHIL NFR BLD AUTO: 0.8 %
ERYTHROCYTE [DISTWIDTH] IN BLOOD BY AUTOMATED COUNT: 13.8 % (ref 11–15)
GLUCOSE BLD-MCNC: 103 MG/DL (ref 70–99)
HCT VFR BLD AUTO: 41.3 % (ref 39–53)
HGB BLD-MCNC: 13.9 G/DL (ref 13–17.5)
IMM GRANULOCYTES # BLD AUTO: 0.02 X10(3) UL (ref 0–1)
IMM GRANULOCYTES NFR BLD: 0.3 %
LYMPHOCYTES # BLD AUTO: 1.01 X10(3) UL (ref 1–4)
LYMPHOCYTES NFR BLD AUTO: 15.8 %
MCH RBC QN AUTO: 27.2 PG (ref 26–34)
MCHC RBC AUTO-ENTMCNC: 33.7 G/DL (ref 31–37)
MCV RBC AUTO: 80.8 FL (ref 80–100)
MONOCYTES # BLD AUTO: 0.99 X10(3) UL (ref 0.1–1)
MONOCYTES NFR BLD AUTO: 15.5 %
NEUTROPHILS # BLD AUTO: 4.3 X10 (3) UL (ref 1.5–7.7)
NEUTROPHILS # BLD AUTO: 4.3 X10(3) UL (ref 1.5–7.7)
NEUTROPHILS NFR BLD AUTO: 67.1 %
OSMOLALITY SERPL CALC.SUM OF ELEC: 295 MOSM/KG (ref 275–295)
PLATELET # BLD AUTO: 142 10(3)UL (ref 150–450)
POTASSIUM SERPL-SCNC: 4 MMOL/L (ref 3.5–5.1)
RBC # BLD AUTO: 5.11 X10(6)UL (ref 4.3–5.7)
SODIUM SERPL-SCNC: 142 MMOL/L (ref 136–145)
TROPONIN I SERPL-MCNC: 22.7 NG/ML (ref ?–0.04)
WBC # BLD AUTO: 6.4 X10(3) UL (ref 4–11)

## 2020-02-14 PROCEDURE — 85025 COMPLETE CBC W/AUTO DIFF WBC: CPT | Performed by: NURSE PRACTITIONER

## 2020-02-14 PROCEDURE — 80048 BASIC METABOLIC PNL TOTAL CA: CPT | Performed by: NURSE PRACTITIONER

## 2020-02-14 PROCEDURE — 84484 ASSAY OF TROPONIN QUANT: CPT | Performed by: NURSE PRACTITIONER

## 2020-02-14 RX ORDER — ASPIRIN 81 MG/1
81 TABLET ORAL DAILY
Qty: 30 TABLET | Refills: 0 | Status: SHIPPED | OUTPATIENT
Start: 2020-02-15

## 2020-02-14 RX ORDER — METOPROLOL TARTRATE 100 MG/1
100 TABLET ORAL
Status: DISCONTINUED | OUTPATIENT
Start: 2020-02-14 | End: 2020-02-15

## 2020-02-14 RX ORDER — LISINOPRIL 5 MG/1
5 TABLET ORAL DAILY
Qty: 30 TABLET | Refills: 0 | Status: SHIPPED | OUTPATIENT
Start: 2020-02-15 | End: 2020-03-10

## 2020-02-14 RX ORDER — CLONIDINE HYDROCHLORIDE 0.1 MG/1
0.1 TABLET ORAL 3 TIMES DAILY
Qty: 90 TABLET | Refills: 0 | Status: SHIPPED | OUTPATIENT
Start: 2020-02-14 | End: 2020-03-10

## 2020-02-14 RX ORDER — ATORVASTATIN CALCIUM 40 MG/1
40 TABLET, FILM COATED ORAL NIGHTLY
Qty: 30 TABLET | Refills: 0 | Status: SHIPPED | OUTPATIENT
Start: 2020-02-14 | End: 2020-03-10

## 2020-02-14 RX ORDER — METOPROLOL TARTRATE 100 MG/1
100 TABLET ORAL
Qty: 60 TABLET | Refills: 0 | Status: SHIPPED | OUTPATIENT
Start: 2020-02-14 | End: 2020-03-10

## 2020-02-14 RX ORDER — CLOPIDOGREL BISULFATE 75 MG/1
75 TABLET ORAL DAILY
Qty: 30 TABLET | Refills: 0 | Status: SHIPPED | OUTPATIENT
Start: 2020-02-14 | End: 2020-03-10

## 2020-02-14 RX ORDER — LISINOPRIL 5 MG/1
5 TABLET ORAL DAILY
Status: DISCONTINUED | OUTPATIENT
Start: 2020-02-14 | End: 2020-02-15

## 2020-02-14 RX ORDER — AMLODIPINE BESYLATE 10 MG/1
10 TABLET ORAL DAILY
Qty: 30 TABLET | Refills: 0 | Status: SHIPPED | OUTPATIENT
Start: 2020-02-15 | End: 2020-03-10

## 2020-02-14 NOTE — PROGRESS NOTES
[facility]  Progress Note    Nicoglenda ColemanAline Patient Status:  Inpatient    3/8/1965 MRN [id number]   Location [facility] 2W/ Attending Rico Edwards MD   [facility] Day # 1 PCP No primary care provider on file. Impression:  1. Daily  Clopidogrel Bisulfate (PLAVIX) tab 300 mg, 300 mg, Oral, Once  amLODIPine Besylate (NORVASC) tab 10 mg, 10 mg, Oral, Daily  nitroGLYCERIN infusion 50mg in D5W 250ml, 5-400 mcg/min, Intravenous, Continuous  hydrALAzine HCl (APRESOLINE) injection 10 m significantly changed. 7. Lesser incidental findings as above.              Earl Napoles MD

## 2020-02-14 NOTE — CARDIAC REHAB
.Cardiac Rehab Phase I    Activity:  Distance Refusing ambulation  Assistance needed   Patient tolerated activity . Education:  Handouts provided and reviewed: [address] Diet: Healthy Cardiac diet reviewed.     Disease Process:

## 2020-02-14 NOTE — DIETARY NOTE
NUTRITION EDUCATION NOTE    Received consult for nutrition education. Appropriate education and handout provided. See education section of Epic for specifics.     Celestine Wahl RD,LDN  [id number]

## 2020-02-14 NOTE — PROGRESS NOTES
Double RN skin check done prior to transfer off Unit. Skin check performed by this RN and Tal Morales RN    Wounds are as follows: NA    Will remain available for any further questions or concerns.

## 2020-02-14 NOTE — PLAN OF CARE
Uneventful night. Pt A/Ox4. VSS on RA. NSR on monitor w/ rare PVCs; HR 70s-80s. BP controlled w/ scheduled oral meds; no prns required. Pt denies SOB/CP throughout night. Rt groin site unremarkable, drsg cdi. CMS intact.  Pt's troponin critical this am at 2 vasoactive medications to optimize hemodynamic stability  - Monitor arterial and/or venous puncture sites for bleeding and/or hematoma  - Assess quality of pulses, skin color and temperature  - Assess for signs of decreased coronary artery perfusion - ex. wound care per orders  - Initiate isolation precautions as appropriate  - Initiate Pressure Ulcer prevention bundle as indicated  Outcome: Progressing

## 2020-02-14 NOTE — PROGRESS NOTES
[facility] Hospitalist Team  Progress Note    Christina Smith Patient Status:  Inpatient    3/8/1965 MRN [id number]   Location [facility] 2W/SW Attending Jerod Jones MD   [facility] Day # 1 PCP No primary care provider on file.      CC: Follow Up  PCP: No prim       Concerns regarding plan of care were discussed with patient. Patient agrees with plan as detailed above.  Discussed plan of care with Dr. Zander Hodge RN, NP  [address] [facility] Team  Pager [phone]  Answering Service Daily  nitroGLYCERIN infusion 50mg in D5W 250ml, 5-400 mcg/min, Intravenous, Continuous  hydrALAzine HCl (APRESOLINE) injection 10 mg, 10 mg, Intravenous, Q3H PRN  atorvastatin (LIPITOR) tab 40 mg, 40 mg, Oral, Daily  cloNIDine HCl (CATAPRES) tab 0.1 mg, 0 report was issued by the [address] [place] [address] Radiology teleradiology service. There are no major discrepancies.    Dictated by (CST): Kaci Rodriguez MD on 2/13/2020 at 7:00     Approved by (CST): Kaci Rodriguez MD on 2/13/2020 at 7:09              SEE ATTENDING NOTE B

## 2020-02-14 NOTE — PLAN OF CARE
Patient stable, right groin cath site intact- no bleeding, swelling or hematoma noted. Nitro Dced this AM- BP stable. Patient refusing to get up and ambulate. Education on medications and side effects given as well as dietary info. Denies any chest pain.  Luisana Romano effectiveness of vasoactive medications to optimize hemodynamic stability  - Monitor arterial and/or venous puncture sites for bleeding and/or hematoma  - Assess quality of pulses, skin color and temperature  - Assess for signs of decreased coronary artery [phone] by Tiffany Mejía RN  Outcome: Progressing  2/13/2020 1801 by Tiffany Mejía RN  Outcome: Progressing  Goal: Incision(s), wounds(s) or drain site(s) healing without S/S of infection  Description  INTERVENTIONS:  - Assess and document risk fact

## 2020-02-15 VITALS
HEART RATE: 70 BPM | HEIGHT: 73 IN | WEIGHT: 253.81 LBS | BODY MASS INDEX: 33.64 KG/M2 | SYSTOLIC BLOOD PRESSURE: 125 MMHG | RESPIRATION RATE: 15 BRPM | TEMPERATURE: 99 F | OXYGEN SATURATION: 96 % | DIASTOLIC BLOOD PRESSURE: 85 MMHG

## 2020-02-15 LAB
ANION GAP SERPL CALC-SCNC: 5 MMOL/L (ref 0–18)
BASOPHILS # BLD AUTO: 0.04 X10(3) UL (ref 0–0.2)
BASOPHILS NFR BLD AUTO: 0.6 %
BUN BLD-MCNC: 18 MG/DL (ref 7–18)
BUN/CREAT SERPL: 12.7 (ref 10–20)
CALCIUM BLD-MCNC: 9.1 MG/DL (ref 8.5–10.1)
CHLORIDE SERPL-SCNC: 107 MMOL/L (ref 98–112)
CO2 SERPL-SCNC: 27 MMOL/L (ref 21–32)
CREAT BLD-MCNC: 1.42 MG/DL (ref 0.7–1.3)
DEPRECATED RDW RBC AUTO: 40.3 FL (ref 35.1–46.3)
EOSINOPHIL # BLD AUTO: 0.16 X10(3) UL (ref 0–0.7)
EOSINOPHIL NFR BLD AUTO: 2.5 %
ERYTHROCYTE [DISTWIDTH] IN BLOOD BY AUTOMATED COUNT: 13.7 % (ref 11–15)
GLUCOSE BLD-MCNC: 109 MG/DL (ref 70–99)
HCT VFR BLD AUTO: 45 % (ref 39–53)
HGB BLD-MCNC: 14.8 G/DL (ref 13–17.5)
IMM GRANULOCYTES # BLD AUTO: 0.04 X10(3) UL (ref 0–1)
IMM GRANULOCYTES NFR BLD: 0.6 %
LYMPHOCYTES # BLD AUTO: 1.27 X10(3) UL (ref 1–4)
LYMPHOCYTES NFR BLD AUTO: 19.8 %
MCH RBC QN AUTO: 26.6 PG (ref 26–34)
MCHC RBC AUTO-ENTMCNC: 32.9 G/DL (ref 31–37)
MCV RBC AUTO: 80.9 FL (ref 80–100)
MONOCYTES # BLD AUTO: 0.95 X10(3) UL (ref 0.1–1)
MONOCYTES NFR BLD AUTO: 14.8 %
NEUTROPHILS # BLD AUTO: 3.97 X10 (3) UL (ref 1.5–7.7)
NEUTROPHILS # BLD AUTO: 3.97 X10(3) UL (ref 1.5–7.7)
NEUTROPHILS NFR BLD AUTO: 61.7 %
OSMOLALITY SERPL CALC.SUM OF ELEC: 290 MOSM/KG (ref 275–295)
PLATELET # BLD AUTO: 162 10(3)UL (ref 150–450)
POTASSIUM SERPL-SCNC: 3.9 MMOL/L (ref 3.5–5.1)
RBC # BLD AUTO: 5.56 X10(6)UL (ref 4.3–5.7)
SODIUM SERPL-SCNC: 139 MMOL/L (ref 136–145)
WBC # BLD AUTO: 6.4 X10(3) UL (ref 4–11)

## 2020-02-15 PROCEDURE — 85025 COMPLETE CBC W/AUTO DIFF WBC: CPT | Performed by: NURSE PRACTITIONER

## 2020-02-15 PROCEDURE — 80048 BASIC METABOLIC PNL TOTAL CA: CPT | Performed by: NURSE PRACTITIONER

## 2020-02-15 NOTE — DISCHARGE SUMMARY
[facility]    Discharge Summary    Matt Palacio Patient Status:  Inpatient    3/8/1965 MRN [id number]   Location [facility] 3W/SW Attending Erlinda Crouch, [facility] Day # 2 PCP No primary care provider on file.      Date of Admi lisinopril 5mg daily   - clonidine 0.1mg tid   - pcp/cards follow up    CKD3  - stable     Thrombocytopenia  - stable, mild    HLD  - statin     preDM - a1c 6.0  - we discussed the option of starting metformin as this may beneficial in secondary prevention mouth daily. What changed:    · medication strength  · how much to take     cloNIDine HCl 0.1 MG Tabs  Commonly known as:  CATAPRES  Take 1 tablet (0.1 mg total) by mouth 3 (three) times daily.   What changed:    · medication strength  · how much to take colette Yu DO  [address] [facility] Hospitalist

## 2020-02-15 NOTE — PROGRESS NOTES
[facility] Cardiology  Progress Note    Ladonna Barnes Patient Status:  Inpatient    3/8/1965 MRN [id number]   Location [facility] 2W/SW Attending Bren Zamorano MD   [facility] Day # 2 PCP No primary care provider on file. Impression:  1. 81 mg, Oral, Daily  Clopidogrel Bisulfate (PLAVIX) tab 300 mg, 300 mg, Oral, Once  amLODIPine Besylate (NORVASC) tab 10 mg, 10 mg, Oral, Daily  nitroGLYCERIN infusion 50mg in D5W 250ml, 5-400 mcg/min, Intravenous, Continuous  hydrALAzine HCl (APRESOLINE) i lymph node, not significantly changed. 7. Lesser incidental findings as above.              Dejah López MD

## 2020-02-15 NOTE — PLAN OF CARE
Problem: Patient Centered Care  Goal: Patient preferences are identified and integrated in the patient's plan of care  Description  Interventions:  - What would you like us to know as we care for you?   - Provide timely, complete, and accurate informatio antiarrhythmic and heart rate control medications as ordered  - Initiate emergency measures for life threatening arrhythmias  - Monitor electrolytes and administer replacement therapy as ordered  Outcome: Progressing     Problem: METABOLIC/FLUID AND ELECTR

## 2020-02-15 NOTE — PLAN OF CARE
Problem: Patient Centered Care  Goal: Patient preferences are identified and integrated in the patient's plan of care  Description  Interventions:  - What would you like us to know as we care for you?  \"I live at home with my wife\"  - Provide timely, co vital signs, obtain 12 lead EKG if indicated  - Evaluate effectiveness of antiarrhythmic and heart rate control medications as ordered  - Initiate emergency measures for life threatening arrhythmias  - Monitor electrolytes and administer replacement therap

## 2020-03-10 PROBLEM — E78.5 HYPERLIPIDEMIA, UNSPECIFIED HYPERLIPIDEMIA TYPE: Status: ACTIVE | Noted: 2020-03-10

## 2020-03-10 PROBLEM — I25.2 HISTORY OF NON-ST ELEVATION MYOCARDIAL INFARCTION (NSTEMI): Status: ACTIVE | Noted: 2020-03-10

## 2020-03-10 PROBLEM — Z86.73 HISTORY OF CVA (CEREBROVASCULAR ACCIDENT): Status: ACTIVE | Noted: 2020-03-10

## 2020-03-10 PROBLEM — E78.5 HYPERLIPIDEMIA LDL GOAL <70: Status: ACTIVE | Noted: 2020-03-10

## 2020-03-10 PROBLEM — I10 ESSENTIAL HYPERTENSION: Status: ACTIVE | Noted: 2020-03-10

## 2020-03-10 PROBLEM — R73.03 PREDIABETES: Status: ACTIVE | Noted: 2020-03-10

## 2020-03-10 PROBLEM — I25.10 CORONARY ARTERY DISEASE INVOLVING NATIVE CORONARY ARTERY OF NATIVE HEART: Status: ACTIVE | Noted: 2020-03-10

## 2020-03-10 PROBLEM — N18.30 CKD (CHRONIC KIDNEY DISEASE) STAGE 3, GFR 30-59 ML/MIN (HCC): Status: ACTIVE | Noted: 2020-03-10

## 2023-07-07 ENCOUNTER — APPOINTMENT (OUTPATIENT)
Dept: CT IMAGING | Facility: HOSPITAL | Age: 58
End: 2023-07-07
Attending: STUDENT IN AN ORGANIZED HEALTH CARE EDUCATION/TRAINING PROGRAM
Payer: COMMERCIAL

## 2023-07-07 ENCOUNTER — APPOINTMENT (OUTPATIENT)
Dept: CT IMAGING | Facility: HOSPITAL | Age: 58
End: 2023-07-07
Attending: EMERGENCY MEDICINE
Payer: COMMERCIAL

## 2023-07-07 ENCOUNTER — APPOINTMENT (OUTPATIENT)
Dept: CV DIAGNOSTICS | Facility: HOSPITAL | Age: 58
End: 2023-07-07
Attending: STUDENT IN AN ORGANIZED HEALTH CARE EDUCATION/TRAINING PROGRAM
Payer: COMMERCIAL

## 2023-07-07 ENCOUNTER — HOSPITAL ENCOUNTER (INPATIENT)
Facility: HOSPITAL | Age: 58
LOS: 2 days | Discharge: HOME | End: 2023-07-09
Attending: EMERGENCY MEDICINE | Admitting: INTERNAL MEDICINE
Payer: COMMERCIAL

## 2023-07-07 DIAGNOSIS — I16.9 HYPERTENSIVE CRISIS: Primary | ICD-10-CM

## 2023-07-07 DIAGNOSIS — R47.81 SLURRED SPEECH: ICD-10-CM

## 2023-07-07 LAB
ALBUMIN SERPL-MCNC: 3.6 G/DL (ref 3.4–5)
ALP LIVER SERPL-CCNC: 65 U/L
ALT SERPL-CCNC: 38 U/L
ANION GAP SERPL CALC-SCNC: 7 MMOL/L (ref 0–18)
APTT PPP: 25.1 SECONDS (ref 23.3–35.6)
AST SERPL-CCNC: 22 U/L (ref 15–37)
ATRIAL RATE: 78 BPM
BASOPHILS # BLD AUTO: 0.02 X10(3) UL (ref 0–0.2)
BASOPHILS NFR BLD AUTO: 0.5 %
BILIRUB DIRECT SERPL-MCNC: 0.1 MG/DL (ref 0–0.2)
BILIRUB SERPL-MCNC: 0.6 MG/DL (ref 0.1–2)
BUN BLD-MCNC: 17 MG/DL (ref 7–18)
BUN/CREAT SERPL: 10.7 (ref 10–20)
CALCIUM BLD-MCNC: 9.3 MG/DL (ref 8.5–10.1)
CHLORIDE SERPL-SCNC: 110 MMOL/L (ref 98–112)
CHOLEST SERPL-MCNC: 231 MG/DL (ref ?–200)
CO2 SERPL-SCNC: 26 MMOL/L (ref 21–32)
CREAT BLD-MCNC: 1.59 MG/DL
DEPRECATED RDW RBC AUTO: 40.4 FL (ref 35.1–46.3)
EOSINOPHIL # BLD AUTO: 0.07 X10(3) UL (ref 0–0.7)
EOSINOPHIL NFR BLD AUTO: 1.8 %
ERYTHROCYTE [DISTWIDTH] IN BLOOD BY AUTOMATED COUNT: 13.8 % (ref 11–15)
EST. AVERAGE GLUCOSE BLD GHB EST-MCNC: 137 MG/DL (ref 68–126)
GFR SERPLBLD BASED ON 1.73 SQ M-ARVRAT: 49 ML/MIN/1.73M2 (ref 60–?)
GLUCOSE BLD-MCNC: 147 MG/DL (ref 70–99)
HBA1C MFR BLD: 6.4 % (ref ?–5.7)
HCT VFR BLD AUTO: 42.5 %
HDLC SERPL-MCNC: 43 MG/DL (ref 40–59)
HGB BLD-MCNC: 14.4 G/DL
IMM GRANULOCYTES # BLD AUTO: 0.01 X10(3) UL (ref 0–1)
IMM GRANULOCYTES NFR BLD: 0.3 %
INR BLD: 1.02 (ref 0.85–1.16)
LDLC SERPL CALC-MCNC: 166 MG/DL (ref ?–100)
LYMPHOCYTES # BLD AUTO: 0.98 X10(3) UL (ref 1–4)
LYMPHOCYTES NFR BLD AUTO: 24.7 %
MCH RBC QN AUTO: 27.4 PG (ref 26–34)
MCHC RBC AUTO-ENTMCNC: 33.9 G/DL (ref 31–37)
MCV RBC AUTO: 81 FL
MONOCYTES # BLD AUTO: 0.53 X10(3) UL (ref 0.1–1)
MONOCYTES NFR BLD AUTO: 13.4 %
NEUTROPHILS # BLD AUTO: 2.35 X10 (3) UL (ref 1.5–7.7)
NEUTROPHILS # BLD AUTO: 2.35 X10(3) UL (ref 1.5–7.7)
NEUTROPHILS NFR BLD AUTO: 59.3 %
NONHDLC SERPL-MCNC: 188 MG/DL (ref ?–130)
OSMOLALITY SERPL CALC.SUM OF ELEC: 300 MOSM/KG (ref 275–295)
P AXIS: 45 DEGREES
P-R INTERVAL: 190 MS
PLATELET # BLD AUTO: 176 10(3)UL (ref 150–450)
POTASSIUM SERPL-SCNC: 3.7 MMOL/L (ref 3.5–5.1)
PROT SERPL-MCNC: 7.9 G/DL (ref 6.4–8.2)
PROTHROMBIN TIME: 13.3 SECONDS (ref 11.6–14.8)
Q-T INTERVAL: 390 MS
QRS DURATION: 94 MS
QTC CALCULATION (BEZET): 444 MS
R AXIS: 6 DEGREES
RBC # BLD AUTO: 5.25 X10(6)UL
SODIUM SERPL-SCNC: 143 MMOL/L (ref 136–145)
T AXIS: 48 DEGREES
TRIGL SERPL-MCNC: 124 MG/DL (ref 30–149)
VENTRICULAR RATE: 78 BPM
VLDLC SERPL CALC-MCNC: 24 MG/DL (ref 0–30)
WBC # BLD AUTO: 4 X10(3) UL (ref 4–11)

## 2023-07-07 PROCEDURE — 70498 CT ANGIOGRAPHY NECK: CPT | Performed by: STUDENT IN AN ORGANIZED HEALTH CARE EDUCATION/TRAINING PROGRAM

## 2023-07-07 PROCEDURE — 70450 CT HEAD/BRAIN W/O DYE: CPT | Performed by: EMERGENCY MEDICINE

## 2023-07-07 PROCEDURE — 70496 CT ANGIOGRAPHY HEAD: CPT | Performed by: STUDENT IN AN ORGANIZED HEALTH CARE EDUCATION/TRAINING PROGRAM

## 2023-07-07 PROCEDURE — 93306 TTE W/DOPPLER COMPLETE: CPT | Performed by: STUDENT IN AN ORGANIZED HEALTH CARE EDUCATION/TRAINING PROGRAM

## 2023-07-07 RX ORDER — MELATONIN
3 NIGHTLY PRN
Status: DISCONTINUED | OUTPATIENT
Start: 2023-07-07 | End: 2023-07-09

## 2023-07-07 RX ORDER — ENOXAPARIN SODIUM 100 MG/ML
40 INJECTION SUBCUTANEOUS DAILY
Status: DISCONTINUED | OUTPATIENT
Start: 2023-07-07 | End: 2023-07-09

## 2023-07-07 RX ORDER — HYDRALAZINE HYDROCHLORIDE 20 MG/ML
10 INJECTION INTRAMUSCULAR; INTRAVENOUS ONCE
Status: COMPLETED | OUTPATIENT
Start: 2023-07-07 | End: 2023-07-07

## 2023-07-07 RX ORDER — ONDANSETRON 2 MG/ML
4 INJECTION INTRAMUSCULAR; INTRAVENOUS EVERY 6 HOURS PRN
Status: DISCONTINUED | OUTPATIENT
Start: 2023-07-07 | End: 2023-07-08

## 2023-07-07 RX ORDER — POLYETHYLENE GLYCOL 3350 17 G/17G
17 POWDER, FOR SOLUTION ORAL DAILY PRN
Status: DISCONTINUED | OUTPATIENT
Start: 2023-07-07 | End: 2023-07-09

## 2023-07-07 RX ORDER — ACETAMINOPHEN 500 MG
500 TABLET ORAL EVERY 4 HOURS PRN
Status: DISCONTINUED | OUTPATIENT
Start: 2023-07-07 | End: 2023-07-08

## 2023-07-07 RX ORDER — SENNOSIDES 8.6 MG
17.2 TABLET ORAL NIGHTLY PRN
Status: DISCONTINUED | OUTPATIENT
Start: 2023-07-07 | End: 2023-07-09

## 2023-07-07 RX ORDER — ATORVASTATIN CALCIUM 40 MG/1
40 TABLET, FILM COATED ORAL NIGHTLY
Status: DISCONTINUED | OUTPATIENT
Start: 2023-07-07 | End: 2023-07-08

## 2023-07-07 RX ORDER — AMLODIPINE BESYLATE 10 MG/1
10 TABLET ORAL DAILY
Status: DISCONTINUED | OUTPATIENT
Start: 2023-07-07 | End: 2023-07-09

## 2023-07-07 RX ORDER — ASPIRIN 81 MG/1
81 TABLET ORAL DAILY
Status: DISCONTINUED | OUTPATIENT
Start: 2023-07-07 | End: 2023-07-08

## 2023-07-07 RX ORDER — LISINOPRIL 10 MG/1
10 TABLET ORAL DAILY
Status: DISCONTINUED | OUTPATIENT
Start: 2023-07-08 | End: 2023-07-08

## 2023-07-07 RX ORDER — PROCHLORPERAZINE EDISYLATE 5 MG/ML
5 INJECTION INTRAMUSCULAR; INTRAVENOUS EVERY 8 HOURS PRN
Status: DISCONTINUED | OUTPATIENT
Start: 2023-07-07 | End: 2023-07-08

## 2023-07-07 RX ORDER — ENEMA 19; 7 G/133ML; G/133ML
1 ENEMA RECTAL ONCE AS NEEDED
Status: DISCONTINUED | OUTPATIENT
Start: 2023-07-07 | End: 2023-07-09

## 2023-07-07 RX ORDER — LISINOPRIL 5 MG/1
5 TABLET ORAL ONCE
Status: COMPLETED | OUTPATIENT
Start: 2023-07-07 | End: 2023-07-07

## 2023-07-07 RX ORDER — METOPROLOL TARTRATE 100 MG/1
100 TABLET ORAL
Status: DISCONTINUED | OUTPATIENT
Start: 2023-07-07 | End: 2023-07-09

## 2023-07-07 RX ORDER — LISINOPRIL 5 MG/1
5 TABLET ORAL DAILY
Status: DISCONTINUED | OUTPATIENT
Start: 2023-07-07 | End: 2023-07-07

## 2023-07-07 RX ORDER — BISACODYL 10 MG
10 SUPPOSITORY, RECTAL RECTAL
Status: DISCONTINUED | OUTPATIENT
Start: 2023-07-07 | End: 2023-07-09

## 2023-07-07 NOTE — ED INITIAL ASSESSMENT (HPI)
Patient ambulatory to ED for dizziness and memory loss that's been going on for the past month. He said this started since his mother past in May. No facial droop, unilateral weakness noticed. Patient states at times he has difficulty finding his words and at times notices slurred speech. This RN does not notice this. Admits to not taking BP meds. BP in 200s.

## 2023-07-07 NOTE — ED QUICK NOTES
Pt back from CT  Neuro exam at baseline  No complaints at this time  Safety precautions in place  Continue to monitor

## 2023-07-07 NOTE — PLAN OF CARE
Problem: Patient Centered Care  Goal: Patient preferences are identified and integrated in the patient's plan of care  Description: Interventions:  - What would you like us to know as we care for you? Home with family   - Provide timely, complete, and accurate information to patient/family  - Incorporate patient and family knowledge, values, beliefs, and cultural backgrounds into the planning and delivery of care  - Encourage patient/family to participate in care and decision-making at the level they choose  - Honor patient and family perspectives and choices  Outcome: Progressing     Problem: Patient/Family Goals  Goal: Patient/Family Long Term Goal  Description: Patient's Long Term Goal: to lower blood pressure    Interventions:  - Monitor vital signs  - See additional Care Plan goals for specific interventions  Outcome: Progressing  Goal: Patient/Family Short Term Goal  Description: Patient's Short Term Goal: to go home    Interventions:   - Monitor vital signs  - See additional Care Plan goals for specific interventions  Outcome: Progressing     Problem: CARDIOVASCULAR - ADULT  Goal: Maintains optimal cardiac output and hemodynamic stability  Description: INTERVENTIONS:  - Monitor vital signs, rhythm, and trends  - Monitor for bleeding, hypotension and signs of decreased cardiac output  - Evaluate effectiveness of vasoactive medications to optimize hemodynamic stability  - Monitor arterial and/or venous puncture sites for bleeding and/or hematoma  - Assess quality of pulses, skin color and temperature  - Assess for signs of decreased coronary artery perfusion - ex.  Angina  - Evaluate fluid balance, assess for edema, trend weights  Outcome: Progressing  Goal: Absence of cardiac arrhythmias or at baseline  Description: INTERVENTIONS:  - Continuous cardiac monitoring, monitor vital signs, obtain 12 lead EKG if indicated  - Evaluate effectiveness of antiarrhythmic and heart rate control medications as ordered  - Initiate emergency measures for life threatening arrhythmias  - Monitor electrolytes and administer replacement therapy as ordered  Outcome: Progressing     Pt alert and oriented X 4. Pt on room air. No complaints throughout the day. Pt had a CTA and an echo completed, see results. Safety precautions in place, call light within reach. Plan is to monitor blood pressure and a MRI.

## 2023-07-07 NOTE — ED QUICK NOTES
Orders for admission, patient is aware of plan and ready to go upstairs. Any questions, please call ED RN lisa at extension [phone].      Patient Covid vaccination status: Fully vaccinated     COVID Test Ordered in ED: None    COVID Suspicion at Admission: N/A    Running Infusions:  None    Mental Status/LOC at time of transport: a/ox3    Other pertinent information:   CIWA score: N/A   NIH score:  1

## 2023-07-08 ENCOUNTER — APPOINTMENT (OUTPATIENT)
Dept: MRI IMAGING | Facility: HOSPITAL | Age: 58
End: 2023-07-08
Attending: STUDENT IN AN ORGANIZED HEALTH CARE EDUCATION/TRAINING PROGRAM
Payer: COMMERCIAL

## 2023-07-08 PROBLEM — I63.9 ISCHEMIC STROKE OF FRONTAL LOBE (HCC): Status: ACTIVE | Noted: 2019-11-29

## 2023-07-08 LAB
ANION GAP SERPL CALC-SCNC: 5 MMOL/L (ref 0–18)
BUN BLD-MCNC: 19 MG/DL (ref 7–18)
BUN/CREAT SERPL: 13.1 (ref 10–20)
CALCIUM BLD-MCNC: 9.4 MG/DL (ref 8.5–10.1)
CHLORIDE SERPL-SCNC: 108 MMOL/L (ref 98–112)
CO2 SERPL-SCNC: 27 MMOL/L (ref 21–32)
CREAT BLD-MCNC: 1.45 MG/DL
DEPRECATED RDW RBC AUTO: 41.4 FL (ref 35.1–46.3)
ERYTHROCYTE [DISTWIDTH] IN BLOOD BY AUTOMATED COUNT: 13.8 % (ref 11–15)
GFR SERPLBLD BASED ON 1.73 SQ M-ARVRAT: 55 ML/MIN/1.73M2 (ref 60–?)
GLUCOSE BLD-MCNC: 102 MG/DL (ref 70–99)
GLUCOSE BLDC GLUCOMTR-MCNC: 106 MG/DL (ref 70–99)
HCT VFR BLD AUTO: 45.1 %
HGB BLD-MCNC: 14.9 G/DL
MCH RBC QN AUTO: 27.3 PG (ref 26–34)
MCHC RBC AUTO-ENTMCNC: 33 G/DL (ref 31–37)
MCV RBC AUTO: 82.8 FL
OSMOLALITY SERPL CALC.SUM OF ELEC: 292 MOSM/KG (ref 275–295)
PLATELET # BLD AUTO: 177 10(3)UL (ref 150–450)
POTASSIUM SERPL-SCNC: 3.9 MMOL/L (ref 3.5–5.1)
RBC # BLD AUTO: 5.45 X10(6)UL
SODIUM SERPL-SCNC: 140 MMOL/L (ref 136–145)
WBC # BLD AUTO: 4.4 X10(3) UL (ref 4–11)

## 2023-07-08 PROCEDURE — 99233 SBSQ HOSP IP/OBS HIGH 50: CPT | Performed by: OTHER

## 2023-07-08 PROCEDURE — 70551 MRI BRAIN STEM W/O DYE: CPT | Performed by: STUDENT IN AN ORGANIZED HEALTH CARE EDUCATION/TRAINING PROGRAM

## 2023-07-08 RX ORDER — ASPIRIN 81 MG/1
81 TABLET, CHEWABLE ORAL DAILY
Status: DISCONTINUED | OUTPATIENT
Start: 2023-07-09 | End: 2023-07-09

## 2023-07-08 RX ORDER — SPIRONOLACTONE 25 MG/1
25 TABLET ORAL DAILY
Status: DISCONTINUED | OUTPATIENT
Start: 2023-07-08 | End: 2023-07-09

## 2023-07-08 RX ORDER — HYDRALAZINE HYDROCHLORIDE 20 MG/ML
10 INJECTION INTRAMUSCULAR; INTRAVENOUS EVERY 6 HOURS PRN
Status: DISCONTINUED | OUTPATIENT
Start: 2023-07-08 | End: 2023-07-08

## 2023-07-08 RX ORDER — CLONIDINE HYDROCHLORIDE 0.1 MG/1
0.1 TABLET ORAL 3 TIMES DAILY
Status: DISCONTINUED | OUTPATIENT
Start: 2023-07-08 | End: 2023-07-09

## 2023-07-08 RX ORDER — HYDRALAZINE HYDROCHLORIDE 20 MG/ML
10 INJECTION INTRAMUSCULAR; INTRAVENOUS EVERY 2 HOUR PRN
Status: DISCONTINUED | OUTPATIENT
Start: 2023-07-08 | End: 2023-07-09

## 2023-07-08 RX ORDER — PROCHLORPERAZINE EDISYLATE 5 MG/ML
5 INJECTION INTRAMUSCULAR; INTRAVENOUS EVERY 8 HOURS PRN
Status: DISCONTINUED | OUTPATIENT
Start: 2023-07-08 | End: 2023-07-09

## 2023-07-08 RX ORDER — ATORVASTATIN CALCIUM 80 MG/1
80 TABLET, FILM COATED ORAL NIGHTLY
Status: DISCONTINUED | OUTPATIENT
Start: 2023-07-08 | End: 2023-07-09

## 2023-07-08 RX ORDER — LISINOPRIL 20 MG/1
20 TABLET ORAL DAILY
Status: DISCONTINUED | OUTPATIENT
Start: 2023-07-09 | End: 2023-07-09

## 2023-07-08 RX ORDER — ONDANSETRON 2 MG/ML
4 INJECTION INTRAMUSCULAR; INTRAVENOUS EVERY 6 HOURS PRN
Status: DISCONTINUED | OUTPATIENT
Start: 2023-07-08 | End: 2023-07-09

## 2023-07-08 RX ORDER — CLOPIDOGREL BISULFATE 75 MG/1
75 TABLET ORAL DAILY
Status: DISCONTINUED | OUTPATIENT
Start: 2023-07-08 | End: 2023-07-08

## 2023-07-08 RX ORDER — ACETAMINOPHEN 325 MG/1
650 TABLET ORAL EVERY 4 HOURS PRN
Status: DISCONTINUED | OUTPATIENT
Start: 2023-07-08 | End: 2023-07-09

## 2023-07-08 RX ORDER — LISINOPRIL 10 MG/1
10 TABLET ORAL ONCE
Status: COMPLETED | OUTPATIENT
Start: 2023-07-08 | End: 2023-07-08

## 2023-07-08 RX ORDER — LABETALOL HYDROCHLORIDE 5 MG/ML
10 INJECTION, SOLUTION INTRAVENOUS EVERY 10 MIN PRN
Status: COMPLETED | OUTPATIENT
Start: 2023-07-08 | End: 2023-07-08

## 2023-07-08 RX ORDER — ACETAMINOPHEN 650 MG/1
650 SUPPOSITORY RECTAL EVERY 4 HOURS PRN
Status: DISCONTINUED | OUTPATIENT
Start: 2023-07-08 | End: 2023-07-09

## 2023-07-08 NOTE — PLAN OF CARE
Mri completed. Bp remains elevated. Meds adjusted. Added hydralazine prn. Neuro status intact wnl. Neuro protocol ordered. Neuro checks. Bed low, locked. Side rails up x2. Call light and belongings within reach. Non skid socks on. Encouraged to call for assistance when needed. Pt calls appropriately. Frequent rounds. Problem: Patient Centered Care  Goal: Patient preferences are identified and integrated in the patient's plan of care  Description: Interventions:  - What would you like us to know as we care for you? Home with family   - Provide timely, complete, and accurate information to patient/family  - Incorporate patient and family knowledge, values, beliefs, and cultural backgrounds into the planning and delivery of care  - Encourage patient/family to participate in care and decision-making at the level they choose  - Honor patient and family perspectives and choices  Outcome: Progressing     Problem: Patient/Family Goals  Goal: Patient/Family Long Term Goal  Description: Patient's Long Term Goal: to lower blood pressure    Interventions:  - Monitor vital signs  - See additional Care Plan goals for specific interventions  Outcome: Progressing  Goal: Patient/Family Short Term Goal  Description: Patient's Short Term Goal: to go home    Interventions:   - Monitor vital signs  - See additional Care Plan goals for specific interventions  Outcome: Progressing     Problem: CARDIOVASCULAR - ADULT  Goal: Maintains optimal cardiac output and hemodynamic stability  Description: INTERVENTIONS:  - Monitor vital signs, rhythm, and trends  - Monitor for bleeding, hypotension and signs of decreased cardiac output  - Evaluate effectiveness of vasoactive medications to optimize hemodynamic stability  - Monitor arterial and/or venous puncture sites for bleeding and/or hematoma  - Assess quality of pulses, skin color and temperature  - Assess for signs of decreased coronary artery perfusion - ex.  Angina  - Evaluate fluid balance, assess for edema, trend weights  Outcome: Not Progressing  Goal: Absence of cardiac arrhythmias or at baseline  Description: INTERVENTIONS:  - Continuous cardiac monitoring, monitor vital signs, obtain 12 lead EKG if indicated  - Evaluate effectiveness of antiarrhythmic and heart rate control medications as ordered  - Initiate emergency measures for life threatening arrhythmias  - Monitor electrolytes and administer replacement therapy as ordered  Outcome: Not Progressing

## 2023-07-08 NOTE — PLAN OF CARE
AOX4. On room air. Denies pain. BP improving from admission. Call light within reach. Plan: MRI today. Problem: Patient Centered Care  Goal: Patient preferences are identified and integrated in the patient's plan of care  Description: Interventions:  - What would you like us to know as we care for you? Home with family   - Provide timely, complete, and accurate information to patient/family  - Incorporate patient and family knowledge, values, beliefs, and cultural backgrounds into the planning and delivery of care  - Encourage patient/family to participate in care and decision-making at the level they choose  - Honor patient and family perspectives and choices  Outcome: Progressing     Problem: Patient/Family Goals  Goal: Patient/Family Long Term Goal  Description: Patient's Long Term Goal: to lower blood pressure    Interventions:  - Monitor vital signs  - See additional Care Plan goals for specific interventions  Outcome: Progressing  Goal: Patient/Family Short Term Goal  Description: Patient's Short Term Goal: to go home    Interventions:   - Monitor vital signs  - See additional Care Plan goals for specific interventions  Outcome: Progressing     Problem: CARDIOVASCULAR - ADULT  Goal: Maintains optimal cardiac output and hemodynamic stability  Description: INTERVENTIONS:  - Monitor vital signs, rhythm, and trends  - Monitor for bleeding, hypotension and signs of decreased cardiac output  - Evaluate effectiveness of vasoactive medications to optimize hemodynamic stability  - Monitor arterial and/or venous puncture sites for bleeding and/or hematoma  - Assess quality of pulses, skin color and temperature  - Assess for signs of decreased coronary artery perfusion - ex.  Angina  - Evaluate fluid balance, assess for edema, trend weights  Outcome: Progressing  Goal: Absence of cardiac arrhythmias or at baseline  Description: INTERVENTIONS:  - Continuous cardiac monitoring, monitor vital signs, obtain 12 lead EKG if indicated  - Evaluate effectiveness of antiarrhythmic and heart rate control medications as ordered  - Initiate emergency measures for life threatening arrhythmias  - Monitor electrolytes and administer replacement therapy as ordered  Outcome: Progressing

## 2023-07-09 VITALS
WEIGHT: 263.88 LBS | HEIGHT: 73 IN | HEART RATE: 65 BPM | BODY MASS INDEX: 34.97 KG/M2 | DIASTOLIC BLOOD PRESSURE: 80 MMHG | RESPIRATION RATE: 17 BRPM | OXYGEN SATURATION: 97 % | SYSTOLIC BLOOD PRESSURE: 134 MMHG | TEMPERATURE: 99 F

## 2023-07-09 LAB
ANION GAP SERPL CALC-SCNC: 7 MMOL/L (ref 0–18)
BUN BLD-MCNC: 20 MG/DL (ref 7–18)
BUN/CREAT SERPL: 12.7 (ref 10–20)
CALCIUM BLD-MCNC: 9.1 MG/DL (ref 8.5–10.1)
CHLORIDE SERPL-SCNC: 108 MMOL/L (ref 98–112)
CO2 SERPL-SCNC: 26 MMOL/L (ref 21–32)
CREAT BLD-MCNC: 1.58 MG/DL
GFR SERPLBLD BASED ON 1.73 SQ M-ARVRAT: 50 ML/MIN/1.73M2 (ref 60–?)
GLUCOSE BLD-MCNC: 118 MG/DL (ref 70–99)
GLUCOSE BLDC GLUCOMTR-MCNC: 109 MG/DL (ref 70–99)
GLUCOSE BLDC GLUCOMTR-MCNC: 126 MG/DL (ref 70–99)
GLUCOSE BLDC GLUCOMTR-MCNC: 176 MG/DL (ref 70–99)
MAGNESIUM SERPL-MCNC: 2.3 MG/DL (ref 1.6–2.6)
OSMOLALITY SERPL CALC.SUM OF ELEC: 296 MOSM/KG (ref 275–295)
POTASSIUM SERPL-SCNC: 3.6 MMOL/L (ref 3.5–5.1)
SODIUM SERPL-SCNC: 141 MMOL/L (ref 136–145)

## 2023-07-09 RX ORDER — LISINOPRIL 20 MG/1
20 TABLET ORAL NIGHTLY
Qty: 30 TABLET | Refills: 0 | Status: SHIPPED | OUTPATIENT
Start: 2023-07-09

## 2023-07-09 RX ORDER — ASPIRIN 81 MG/1
81 TABLET ORAL DAILY
Qty: 30 TABLET | Refills: 0 | Status: SHIPPED | OUTPATIENT
Start: 2023-07-09

## 2023-07-09 RX ORDER — AMLODIPINE BESYLATE 10 MG/1
10 TABLET ORAL DAILY
Qty: 90 TABLET | Refills: 2 | Status: SHIPPED | OUTPATIENT
Start: 2023-07-09

## 2023-07-09 RX ORDER — POTASSIUM CHLORIDE 20 MEQ/1
40 TABLET, EXTENDED RELEASE ORAL EVERY 4 HOURS
Status: COMPLETED | OUTPATIENT
Start: 2023-07-09 | End: 2023-07-09

## 2023-07-09 RX ORDER — LISINOPRIL 20 MG/1
20 TABLET ORAL NIGHTLY
Status: DISCONTINUED | OUTPATIENT
Start: 2023-07-10 | End: 2023-07-09

## 2023-07-09 RX ORDER — METOPROLOL TARTRATE 100 MG/1
100 TABLET ORAL
Qty: 180 TABLET | Refills: 2 | Status: SHIPPED | OUTPATIENT
Start: 2023-07-09

## 2023-07-09 RX ORDER — SPIRONOLACTONE 25 MG/1
25 TABLET ORAL DAILY
Qty: 30 TABLET | Refills: 0 | Status: SHIPPED | OUTPATIENT
Start: 2023-07-10

## 2023-07-09 RX ORDER — ATORVASTATIN CALCIUM 80 MG/1
80 TABLET, FILM COATED ORAL NIGHTLY
Qty: 30 TABLET | Refills: 0 | Status: SHIPPED | OUTPATIENT
Start: 2023-07-09

## 2023-07-09 RX ORDER — CLONIDINE HYDROCHLORIDE 0.1 MG/1
0.1 TABLET ORAL 3 TIMES DAILY
Qty: 90 TABLET | Refills: 3 | Status: SHIPPED | OUTPATIENT
Start: 2023-07-09

## 2023-07-09 NOTE — DISCHARGE SUMMARY
General Medicine Discharge Summary     Patient ID:  Bernadine Easley  59year old  3/8/1965    Admit date: 7/7/2023    Discharge date and time: 7/9/2023    Attending Physician: Rod Valverde DO     Consults: IP CONSULT TO HOSPITALIST  IP CONSULT TO NEUROLOGY  IP CONSULT TO SOCIAL WORK    Primary Care Physician: Tatianna Whittaker MD     Reason for admission: Slurred speech high blood pressure    Risk For Readmission: High    Discharge Diagnoses: Hypertensive crisis [I16.9]  Slurred speech [R47.81]  See Additional Discharge Diagnoses in Hospital Course    Discharged Condition: fair    Follow-up with labs/images appointments:   Patient needs close follow-up his week with primary care provider, handoff has been provided to primary care provider and will need blood pressure check and repeat labs as multiple medications have been restarted. Patient does need Holter monitor as an outpatient  -She needs follow-up with neurology to arrange Holter monitor    Exam  Gen: No acute distress  Pulm: Lungs clear, normal respiratory effort  CV: Heart with regular rate and rhythm  Abd: Abdomen soft,   EXT: no edema     HPI:   Patient is a 59year old male with PMH essential hypertension coronary artery disease status post PCI hyperlipidemia who presents with intermittent slurred speech. He reports he stopped taking his blood pressure medicine approximately 1 year ago he does not cite any specific reason such as cost or side effects he felt he was just tired of taking this medication, approximately 2 months ago his mother passed away he has been under more stress recently he has noted intermittent slurred speech sometimes has normal fluid speech occasionally has difficulty with word finding and difficulty with enunciation of words.   He reports his speech currently is well controlled and not slurred in the room however if he gets stressed or anxious it would become slurred the reason he came in for evaluation today is because he [facility] talking and wants to have a normal speech pattern\" patient has no other complaints such as chest pain shortness of breath lower extremity edema orthopnea headache dizziness vision changes fevers or chills. He works as a radiology technician. Hospital Course:  Patient was admitted for slurred speech found to have intermittent high blood pressure. He was found to have an subacute mild left frontal cortical infarct. MRI brain was completed additional work-up was negative patient was restarted on multiple medications including aspirin statin and many blood pressure medications. I spent an extensive amount of time discussing with patient the need for compliance as well as the need for treatment of his underlying sleep apnea. He will need close follow-up with primary care provider this week for repeat blood pressure check and labs also to arrange outpatient sleep study    Operative Procedures:      Imaging: MRI BRAIN (CPT=70551)    Result Date: 7/8/2023  CONCLUSION:  1. No acute infarct or hemorrhage. 2. Advanced changes of chronic small vessel disease in both cerebral hemispheres including white matter disease, chronic bilateral thalamic, left basal ganglionic, and subinsular lacunar infarcts. 3. Changes of chronic small vessel disease in brainstem and cerebellum with chronic lacunar infarcts. 4. Probably subacute (greater than 3 weeks) small left frontal cortical infarct. 5. Small chronic lacunar size right occipital cortical infarct. Dictated by (CST): Britt Troncoso MD on 7/08/2023 at 9:57 AM     Finalized by (CST): Britt Troncoso MD on 7/08/2023 at 10:14 AM          CTA BRAIN + CTA CAROTIDS (XUS=[id number])    Result Date: 7/7/2023  CONCLUSION:  1. No proximal intracranial flow limiting stenosis/large vessel occlusion; no intracranial aneurysm identified.   If symptoms or suspicion for acute ischemia persist, consider followup brain MR. 2. No hemodynamically significant stenosis or dissection involving the cervical carotid or vertebral arteries. Partial retropharyngeal course of the left internal carotid artery. 3. Noncontrast head CT demonstrates no acute intracranial hemorrhage or other acute intracranial finding. 4. Chronic-appearing lacunar infarcts within the right thalamus and both cerebellar hemispheres. Nonspecific white matter changes involving both cerebral hemispheres that most likely reflect sequelae of chronic microangiopathy. 5. Borderline dilation of the main pulmonary artery trunk may relate to underlying pulmonary hypertension. 6. Heterogeneous density thyroid nodules, which measure up to 2 cm in the left lobe. 7. Lesser incidental findings as above. 1.  elm-remote  Dictated by (CST): Reese Almaguer MD on 7/07/2023 at 1:46 PM     Finalized by (CST): Reese Almaguer MD on 7/07/2023 at 1:58 PM          CT BRAIN OR HEAD ([id number])    Result Date: 7/7/2023  CONCLUSION:  1. No acute intracranial process by noncontrast CT technique. If there is clinical concern for acute ischemia, follow-up MRI suggested. 2. Senescent changes of parenchymal volume loss with advanced sequela of chronic microvascular ischemic disease, including multiple chronic lacunar infarcts of the basal ganglia, right thalamus, and bilateral cerebral hemispheres. 3. There is large vessel atherosclerosis involving the anterior and posterior circulations. 4. Lesser incidental findings as above.      Dictated by (CST): Chandan Aguilar MD on 7/07/2023 at 7:20 AM     Finalized by (CST): Chandan Aguilar MD on 7/07/2023 at 7:24 AM           Disposition: home    Activity: activity as tolerated  Diet: regular diet  Wound Care: none needed  Code Status: Full Code  O2: none    Home Medication Changes:   Multiple medications were resumed some medications he reported he was not taking home were resumed and we started new medication such as spironolactone and increase the dose of Lipitor and lisinopril    Med list     Medication List START taking these medications      spironolactone 25 MG Tabs  Commonly known as: Aldactone  Take 1 tablet (25 mg total) by mouth daily. Start taking on: July 10, 2023            CHANGE how you take these medications      atorvastatin 80 MG Tabs  Commonly known as: Lipitor  Take 1 tablet (80 mg total) by mouth nightly. What changed:   medication strength  how much to take     lisinopril 20 MG Tabs  Commonly known as: Prinivil; Zestril  Take 1 tablet (20 mg total) by mouth at bedtime. What changed:   medication strength  how much to take  when to take this            CONTINUE taking these medications      amLODIPine 10 MG Tabs  Commonly known as: Norvasc  Take 1 tablet (10 mg total) by mouth daily. aspirin 81 MG Tbec  Take 1 tablet (81 mg total) by mouth daily. cloNIDine 0.1 MG Tabs  Commonly known as: Catapres  Take 1 tablet (0.1 mg total) by mouth 3 (three) times daily. metoprolol tartrate 100 MG Tabs  Commonly known as: Lopressor  Take 1 tablet (100 mg total) by mouth 2x Daily(Beta Blocker). ASK your doctor about these medications      clopidogrel 75 MG Tabs  Commonly known as: Plavix  Take 1 tablet (75 mg total) by mouth daily. Where to Get Your Medications        These medications were sent to [address], [address] [place] [address] [address] [place] [address]., [phone], [address] [place] [address] [facility] [id number]      Phone: [phone]   amLODIPine 10 MG Tabs  aspirin 81 MG Tbec  atorvastatin 80 MG Tabs  cloNIDine 0.1 MG Tabs  lisinopril 20 MG Tabs  metoprolol tartrate 100 MG Tabs  spironolactone 25 MG Tabs         FU   Follow-up Information       Gary Cisneros MD Follow up in 1 month(s). Specialty: NEUROLOGY  Contact information:  [address] [place] [address]  [phone]               Carolyn Espinosa MD Follow up in 1 week(s).     Specialty: Internal Medicine  Why: You will need BP check and labs checked within one month. Contact information:  [address]  [phone]                             DC instructions: Other Discharge Instructions: You were found to have a small subacute stroke, this was due to elevated blood pressure. You need to take all of your blood pressure medications   You will need your blood pressures checked and labs checked within one week to make sure your kidneys are tolerating the new medications     I also suggest you get a sleep study completed as this may be the cause of your high blood pressure             I reconciled current and discharge medications on day of discharge, discussed changes with patient and noted changes above.        Total Time Coordinating Care: 35 minutes    Patient had opportunity to ask questions and state understand and agree with therapeutic plan as outlined    Thank You,    Rosaria Cranker,    Hospitalist with [facility]

## 2023-07-09 NOTE — PLAN OF CARE
Pt A/Ox4. Room air. Q4 neuro's. OT recommending day rehab for coordination issues. Speech- no recommendations. Cleared by neuro. DC orders in place. Informed pt about safety precautions while driving and coordination is an issue. Pt insisting on driving self home. Attempted to call wife no answer. AVS printed and eduction provided. Problem: Patient Centered Care  Goal: Patient preferences are identified and integrated in the patient's plan of care  Description: Interventions:  - What would you like us to know as we care for you?  Home with family   - Provide timely, complete, and accurate information to patient/family  - Incorporate patient and family knowledge, values, beliefs, and cultural backgrounds into the planning and delivery of care  - Encourage patient/family to participate in care and decision-making at the level they choose  - Honor patient and family perspectives and choices  7/9/2023 1347 by Annita Kirk RN  Outcome: Completed  7/9/2023 1347 by Annita Kirk RN  Outcome: Progressing     Problem: Patient/Family Goals  Goal: Patient/Family Long Term Goal  Description: Patient's Long Term Goal: to lower blood pressure    Interventions:  - Monitor vital signs  - See additional Care Plan goals for specific interventions  7/9/2023 1347 by Annita Kirk RN  Outcome: Completed  7/9/2023 1347 by Annita Kirk RN  Outcome: Progressing  Goal: Patient/Family Short Term Goal  Description: Patient's Short Term Goal: to go home    Interventions:   - Monitor vital signs  - See additional Care Plan goals for specific interventions  7/9/2023 1347 by Annita Kirk RN  Outcome: Completed  7/9/2023 1347 by Annita Kirk RN  Outcome: Progressing     Problem: CARDIOVASCULAR - ADULT  Goal: Maintains optimal cardiac output and hemodynamic stability  Description: INTERVENTIONS:  - Monitor vital signs, rhythm, and trends  - Monitor for bleeding, hypotension and signs of decreased cardiac output  - Evaluate effectiveness of vasoactive medications to optimize hemodynamic stability  - Monitor arterial and/or venous puncture sites for bleeding and/or hematoma  - Assess quality of pulses, skin color and temperature  - Assess for signs of decreased coronary artery perfusion - ex.  Angina  - Evaluate fluid balance, assess for edema, trend weights  7/9/2023 1347 by Otis Lockwood RN  Outcome: Completed  7/9/2023 1347 by Otis Lockwood RN  Outcome: Progressing  Goal: Absence of cardiac arrhythmias or at baseline  Description: INTERVENTIONS:  - Continuous cardiac monitoring, monitor vital signs, obtain 12 lead EKG if indicated  - Evaluate effectiveness of antiarrhythmic and heart rate control medications as ordered  - Initiate emergency measures for life threatening arrhythmias  - Monitor electrolytes and administer replacement therapy as ordered  7/9/2023 1347 by Otis Lockwood RN  Outcome: Completed  7/9/2023 1347 by Otis Lockwood RN  Outcome: Progressing     Problem: PAIN - ADULT  Goal: Verbalizes/displays adequate comfort level or patient's stated pain goal  Description: INTERVENTIONS:  - Encourage pt to monitor pain and request assistance  - Assess pain using appropriate pain scale  - Administer analgesics based on type and severity of pain and evaluate response  - Implement non-pharmacological measures as appropriate and evaluate response  - Consider cultural and social influences on pain and pain management  - Manage/alleviate anxiety  - Utilize distraction and/or relaxation techniques  - Monitor for opioid side effects  - Notify MD/LIP if interventions unsuccessful or patient reports new pain  - Anticipate increased pain with activity and pre-medicate as appropriate  7/9/2023 1347 by Otis Lockwood RN  Outcome: Completed  7/9/2023 1347 by Otis Lockwood RN  Outcome: Progressing     Problem: SAFETY ADULT - FALL  Goal: Free from fall injury  Description: INTERVENTIONS:  - Assess pt frequently for physical needs  - Identify cognitive and physical deficits and behaviors that affect risk of falls.   - [place] fall precautions as indicated by assessment.  - Educate pt/family on patient safety including physical limitations  - Instruct pt to call for assistance with activity based on assessment  - Modify environment to reduce risk of injury  - Provide assistive devices as appropriate  - Consider OT/PT consult to assist with strengthening/mobility  - Encourage toileting schedule  7/9/2023 1347 by Wei Shen RN  Outcome: Completed  7/9/2023 1347 by Wei Shen RN  Outcome: Progressing     Problem: DISCHARGE PLANNING  Goal: Discharge to home or other facility with appropriate resources  Description: INTERVENTIONS:  - Identify barriers to discharge w/pt and caregiver  - Include patient/family/discharge partner in discharge planning  - Arrange for needed discharge resources and transportation as appropriate  - Identify discharge learning needs (meds, wound care, etc)  - Arrange for interpreters to assist at discharge as needed  - Consider post-discharge preferences of patient/family/discharge partner  - Complete POLST form as appropriate  - Assess patient's ability to be responsible for managing their own health  - Refer to Case Management Department for coordinating discharge planning if the patient needs post-hospital services based on physician/LIP order or complex needs related to functional status, cognitive ability or social support system  7/9/2023 1347 by Wei Shen RN  Outcome: Completed  7/9/2023 1347 by Wei Shne RN  Outcome: Progressing

## 2023-07-09 NOTE — PLAN OF CARE
BP improving. Neuro checks- no deficits. Pt/OT to see. Will continue to monitor. Problem: Patient Centered Care  Goal: Patient preferences are identified and integrated in the patient's plan of care  Description: Interventions:  - What would you like us to know as we care for you? Home with family   - Provide timely, complete, and accurate information to patient/family  - Incorporate patient and family knowledge, values, beliefs, and cultural backgrounds into the planning and delivery of care  - Encourage patient/family to participate in care and decision-making at the level they choose  - Honor patient and family perspectives and choices  Outcome: Progressing     Problem: CARDIOVASCULAR - ADULT  Goal: Maintains optimal cardiac output and hemodynamic stability  Description: INTERVENTIONS:  - Monitor vital signs, rhythm, and trends  - Monitor for bleeding, hypotension and signs of decreased cardiac output  - Evaluate effectiveness of vasoactive medications to optimize hemodynamic stability  - Monitor arterial and/or venous puncture sites for bleeding and/or hematoma  - Assess quality of pulses, skin color and temperature  - Assess for signs of decreased coronary artery perfusion - ex.  Angina  - Evaluate fluid balance, assess for edema, trend weights  Outcome: Progressing  Goal: Absence of cardiac arrhythmias or at baseline  Description: INTERVENTIONS:  - Continuous cardiac monitoring, monitor vital signs, obtain 12 lead EKG if indicated  - Evaluate effectiveness of antiarrhythmic and heart rate control medications as ordered  - Initiate emergency measures for life threatening arrhythmias  - Monitor electrolytes and administer replacement therapy as ordered  Outcome: Progressing     Problem: PAIN - ADULT  Goal: Verbalizes/displays adequate comfort level or patient's stated pain goal  Description: INTERVENTIONS:  - Encourage pt to monitor pain and request assistance  - Assess pain using appropriate pain scale  - Administer analgesics based on type and severity of pain and evaluate response  - Implement non-pharmacological measures as appropriate and evaluate response  - Consider cultural and social influences on pain and pain management  - Manage/alleviate anxiety  - Utilize distraction and/or relaxation techniques  - Monitor for opioid side effects  - Notify MD/LIP if interventions unsuccessful or patient reports new pain  - Anticipate increased pain with activity and pre-medicate as appropriate  Outcome: Progressing     Problem: SAFETY ADULT - FALL  Goal: Free from fall injury  Description: INTERVENTIONS:  - Assess pt frequently for physical needs  - Identify cognitive and physical deficits and behaviors that affect risk of falls.   - [place] fall precautions as indicated by assessment.  - Educate pt/family on patient safety including physical limitations  - Instruct pt to call for assistance with activity based on assessment  - Modify environment to reduce risk of injury  - Provide assistive devices as appropriate  - Consider OT/PT consult to assist with strengthening/mobility  - Encourage toileting schedule  Outcome: Progressing     Problem: DISCHARGE PLANNING  Goal: Discharge to home or other facility with appropriate resources  Description: INTERVENTIONS:  - Identify barriers to discharge w/pt and caregiver  - Include patient/family/discharge partner in discharge planning  - Arrange for needed discharge resources and transportation as appropriate  - Identify discharge learning needs (meds, wound care, etc)  - Arrange for interpreters to assist at discharge as needed  - Consider post-discharge preferences of patient/family/discharge partner  - Complete POLST form as appropriate  - Assess patient's ability to be responsible for managing their own health  - Refer to Case Management Department for coordinating discharge planning if the patient needs post-hospital services based on physician/LIP order or complex needs related to functional status, cognitive ability or social support system  Outcome: Progressing

## 2023-07-09 NOTE — DISCHARGE INSTRUCTIONS
You were found to have a small subacute stroke, this was due to elevated blood pressure. You need to take all of your blood pressure medications   You will need your blood pressures checked and labs checked within one week to make sure your kidneys are tolerating the new medications     I also suggest you get a sleep study completed as this may be the cause of your high blood pressure     Day Rehab at [facility]  To schedule one-on-one therapy with a physical, occupational or speech therapist, please call [phone].     Day Rehab at [facility]  To schedule call [phone]

## 2023-07-09 NOTE — CM/SW NOTE
SW followed up on DC planning. SW received message from OT and RN that pt will need day rehab upon DC. Order entered in for Day rehab. Clincial notes sent to [facility] day rehab for them to call the pt to schedule - likely will be in a few days up to a week    Resource for [facility] day rehab and [facility] uploaded to AVS for pt to choose form     PLAN: DC home and follow up for day rehab    LONI Aguilar, MSW ext.  [phone]

## 2023-07-09 NOTE — SLP NOTE
ADULT SWALLOWING & COGNITIVE-COMMUNICATION EVALUATION    ASSESSMENT    ASSESSMENT/OVERALL IMPRESSION:    Orders received, chart reviewed, clinical bedside swallow evaluation and cognitive-communication evaluation complete per stroke protocol. Patient admitted from home with c/o dysarthria and word finding difficulties x2-3 weeks. [address] and MRI brain imaging 7/8/23 reviewed as below. Patient known to this service from previous admission 11/2019 with recommendation for regular diet and thin liquids, no cognitive-communication assessment at that time. RN authorizes visit. Patient passed RN swallow screen 7/8/23 and cardiac diet ordered. No family at bedside. Patient received alert and upright at edge of bed with AM meal tray, afebrile, in NAD, oriented x4, denies dysphagia, endorses dysarthria but denies any other acute changes in cognition or communication. Oral motor mechanism examination grossly unremarkable. Speech intelligibility 90% in conversation at slow rate. Expressive and receptive language appear intact to support concrete conversation without overt word finding or comprehension deficits. Patient self-administered bites of dry solid crackers and cup/straw sips thin liquids with oropharyngeal timing and control which appears adequate/functional and without overt signs aspiration/distress. Patient achieved [facility]ILITATION version 1 score of 21/30 with deficits noted in trail making, immediate and delayed recall, verbal fluency and mental manipulations. Patient presents without overt evidence oropharyngeal dysphagia at bedside, appears appropriate to continue current diet with standard aspiration precautions. Patient presents with at least mild cognitive-communication impairment and mild motor speech impairment and will continue to benefit from SLP service while in house as well as following discharge.  Given this presentation with patient high PLOF and home/work demands, consider SLP with day rehab program vs outpatient. Acute SLP service will continue to follow while in house. Plan and recommendations reviewed with patient and RN at bedside. Written recommendations posted on whiteboard. [facility] swallowing score: 7/7. RECOMMENDATIONS   Diet Recommendations - Solids: Regular  Diet Recommendations - Liquids: Thin Liquids  Compensatory Strategies Recommended: Slow rate;Small bites and sips; Alternate consistencies  Aspiration Precautions: Upright position; Slow rate;Small bites and sips  Medication Administration Recommendations: One pill at a time    Treatment Plan/Recommendations: Aspiration precautions;Cognitive communication therapy; Dysarthria therapy  Discharge Recommendations/Plan: Day rehab;Outpatient SLP    HISTORY   MEDICAL HISTORY  Reason for Referral: Stroke protocol    Problem List  Principal Problem:    Hypertensive crisis  Active Problems:    Ischemic stroke of frontal lobe (HCC)    Slurred speech    Past Medical History  Past Medical History:   Diagnosis Date    Coronary atherosclerosis     CVA (cerebral vascular accident) ([facility] [address]     Essential hypertension     High blood pressure     Hyperlipidemia      Prior Living Situation: Home with spouse  Diet Prior to Admission: Regular; Thin liquids  Precautions: Aspiration    Patient/Family Goals: Go back to work    SWALLOWING HISTORY  Current Diet Consistency: Regular; Thin liquids  Dysphagia History: As above    Imaging Results:     [address] [place] [address] 7/8/23:  CONCLUSION:   1. No acute intracranial process by noncontrast CT technique. If there is clinical concern for acute ischemia, follow-up MRI suggested. 2. Senescent changes of parenchymal volume loss with advanced sequela of chronic microvascular ischemic disease, including multiple chronic lacunar infarcts of the basal ganglia, right thalamus, and bilateral cerebral hemispheres. 3. There is large vessel atherosclerosis involving the anterior and posterior circulations. 4. Lesser incidental findings as above.        MRI brain 23:  CONCLUSION:   1. No acute infarct or hemorrhage. 2. Advanced changes of chronic small vessel disease in both cerebral hemispheres including white matter disease, chronic bilateral thalamic, left basal ganglionic, and subinsular lacunar infarcts. 3. Changes of chronic small vessel disease in brainstem and cerebellum with chronic lacunar infarcts. 4. Probably subacute (greater than 3 weeks) small left frontal cortical infarct. 5. Small chronic lacunar size right occipital cortical infarct. OBJECTIVE   ORAL MOTOR EXAMINATION     Symmetry: Within Functional Limits  Strength: Within Functional Limits  Tone: Within Functional Limits  Range of Motion: Within Functional Limits  Rate of Motion: Within Functional Limits    Voice Quality: Clear  Respiratory Status: Unlabored  Consistencies Trialed: Thin liquids; Hard solid  Method of Presentation: Self presentation  Patient Positioning: Upright;Midline    Oral Phase of Swallow: Within Functional Limits    Pharyngeal Phase of Swallow: Within Functional Limits  (Please note: Silent aspiration cannot be evaluated clinically.  Videofluoroscopic Swallow Study is required to rule-out silent aspiration.)    Esophageal Phase of Swallow: No complaints consistent with possible esophageal involvement      Administered the German Cognitive Assessment (MoCA) with the following results obtained:    Executive Function/Visuospatial: 35  Trail makin/1  Design copy:   Clock drawin3     Naming: 33     Memory: 1  Immediate recall: 35  Delayed recall:      Attention:   Working memory for numerical manipulation: 1  Alpha ID:   Serial 7s: 2/3     Language: 2/3  Sentence repetition: 2/2  Verbal fluency: 0/     Abstraction: 2/2     Orientation:      TOTAL:   (WNL )      GOALS  Goal #1 Patient will demonstrate safe and efficient clinical tolerance for regular diet and thin liquids without overt signs aspiration/distress x1-2 f/u visits. In Progress   Goal #2 The patient/family/caregiver will demonstrate understanding and implementation of aspiration precautions and swallow strategies independently over 1-2 session(s). In Progress   Goal #3 Patient will improve speech intelligibility to 95% in conversation with independent use of compensatory speech strategies. In Progress   Goal #4 Patient will improve memory for delayed recall of 2-3 units of novel information given min A for strategy implementation. In Progress   Goal #5 Patient will improve thought organization and word retrieval for min complex divergent naming tasks given min A for strategy implementation. In Progress   Goal #6 Patient will achieve 90% accuracy for min complex executive function tasks which simulate demands of home/work/community environment with mod A for strategy implementation. In Progress   Goal #7 Patient will participate in ongoing cognitive-communication assessment tasks as indicated. In Progress       FOLLOW UP  Treatment Plan/Recommendations: Aspiration precautions;Cognitive communication therapy; Dysarthria therapy  Number of Visits to Meet Established Goals: 2  Follow Up Needed (Documentation Required): Yes  SLP Follow-up Date: 07/10/23    Thank you for your referral.   If you have any questions, please contact Jenny Bishop, SLP  [facility]  [phone]

## 2023-07-10 ENCOUNTER — PATIENT OUTREACH (OUTPATIENT)
Dept: CASE MANAGEMENT | Age: 58
End: 2023-07-10

## 2023-07-10 NOTE — PROGRESS NOTES
1st attempt Neurology apt request (dc 07/09)    Dr Geovanny Capps  [address] [facility]  [phone]  Follow up 4-6 weeks  Unable to contact pt (note states DO NOT LM); will try again

## 2023-07-11 NOTE — PROGRESS NOTES
2nd attempt Neurology apt request (dc 07/09)     Dr Pooja Fragoso  [address] [facility]  [phone]  Follow up 4-6 weeks  Unable to contact pt (note states DO NOT LM); will try again

## 2023-07-13 NOTE — PROGRESS NOTES
3rd attempt Neurology apt request (dc 07/09)     Dr Samuel Bateman  [address]  [facility]  [phone]  Follow up 4-6 weeks  Multiple attempts; unable to contact pt (note states DO NOT LM); no apt made  Closing encounter

## 2023-08-17 ENCOUNTER — TELEPHONE (OUTPATIENT)
Dept: NEUROLOGY | Facility: CLINIC | Age: 58
End: 2023-08-17

## 2023-08-17 NOTE — TELEPHONE ENCOUNTER
Dr. Jennifer Hicks saw Patient in [facility] 7/7 and called for a f/u. First available is 10/4; does that work or does she want to double book him? Patient also said he believes he is due for a follow up MRI; does she want to   put in an Order for one?

## 2023-08-21 NOTE — TELEPHONE ENCOUNTER
Patient called and said in his discharge notes it says to have   A follow up MRI 30 days after discharge. He was discharged on 7/9. Would Dr. Micheline Lundberg do the Order for that and then want to see him?

## 2023-08-21 NOTE — TELEPHONE ENCOUNTER
Phone call returned to pt. Rescheduled pt to 9/6/23 with Dr. Mya Chan. Advised pt that he may discuss a follow up MRI at his visit with Dr. Mya Chan. Pt verbalized agreement and understanding.

## 2023-09-06 ENCOUNTER — OFFICE VISIT (OUTPATIENT)
Dept: NEUROLOGY | Facility: CLINIC | Age: 58
End: 2023-09-06
Payer: COMMERCIAL

## 2023-09-06 VITALS
WEIGHT: 275 LBS | BODY MASS INDEX: 36.45 KG/M2 | DIASTOLIC BLOOD PRESSURE: 118 MMHG | HEART RATE: 88 BPM | HEIGHT: 73 IN | SYSTOLIC BLOOD PRESSURE: 226 MMHG

## 2023-09-06 DIAGNOSIS — I10 UNCONTROLLED HYPERTENSION: ICD-10-CM

## 2023-09-06 DIAGNOSIS — I63.9 CEREBROVASCULAR ACCIDENT (CVA), UNSPECIFIED MECHANISM (HCC): ICD-10-CM

## 2023-09-06 DIAGNOSIS — I16.1 HYPERTENSIVE EMERGENCY: ICD-10-CM

## 2023-09-06 DIAGNOSIS — R47.81 SLURRED SPEECH: Primary | ICD-10-CM

## 2023-09-06 PROCEDURE — 3080F DIAST BP >= 90 MM HG: CPT | Performed by: STUDENT IN AN ORGANIZED HEALTH CARE EDUCATION/TRAINING PROGRAM

## 2023-09-06 PROCEDURE — 99214 OFFICE O/P EST MOD 30 MIN: CPT | Performed by: STUDENT IN AN ORGANIZED HEALTH CARE EDUCATION/TRAINING PROGRAM

## 2023-09-06 PROCEDURE — 3077F SYST BP >= 140 MM HG: CPT | Performed by: STUDENT IN AN ORGANIZED HEALTH CARE EDUCATION/TRAINING PROGRAM

## 2023-09-06 PROCEDURE — 3008F BODY MASS INDEX DOCD: CPT | Performed by: STUDENT IN AN ORGANIZED HEALTH CARE EDUCATION/TRAINING PROGRAM

## 2023-10-06 ENCOUNTER — TELEPHONE (OUTPATIENT)
Dept: NEUROLOGY | Facility: CLINIC | Age: 58
End: 2023-10-06

## 2023-10-06 NOTE — TELEPHONE ENCOUNTER
I spoke with ABHISHEK FLORES and informed him that Dr. Jennifer Hicks did not order the holter monitor as she recommends that the patient follow up with cardiology and his PCP. Informed that patient per protocol, he would need to be seen by a cardiologist before an order would be placed - and the order will likely come from cardiology. Pt verbalized understanding and stated he will establish care with a cardiologist as well as contact his PCP. Reviewed and electronically signed by:  [address], [facility]

## 2023-10-06 NOTE — TELEPHONE ENCOUNTER
Patient called to say that at his last appointment (9/6) Dr. Aviles Patient told him to   have a Holter Monitor test but when he called they said there wasn't an Order for one. He said they could get him in Monday at 8 am but I told him since Doctor Sagar Mondragon wasn't in the office I didn't think we would be able to make that work but would be informing the Staff of the Order being needed.

## 2024-08-12 ENCOUNTER — APPOINTMENT (OUTPATIENT)
Dept: MRI IMAGING | Facility: HOSPITAL | Age: 59
End: 2024-08-12
Attending: EMERGENCY MEDICINE
Payer: COMMERCIAL

## 2024-08-12 ENCOUNTER — HOSPITAL ENCOUNTER (EMERGENCY)
Facility: HOSPITAL | Age: 59
Discharge: HOME | End: 2024-08-12
Attending: EMERGENCY MEDICINE
Payer: COMMERCIAL

## 2024-08-12 VITALS
BODY MASS INDEX: 38 KG/M2 | HEART RATE: 60 BPM | SYSTOLIC BLOOD PRESSURE: 189 MMHG | TEMPERATURE: 98 F | WEIGHT: 285 LBS | DIASTOLIC BLOOD PRESSURE: 106 MMHG | RESPIRATION RATE: 15 BRPM | OXYGEN SATURATION: 95 %

## 2024-08-12 DIAGNOSIS — R51.9 NONINTRACTABLE HEADACHE, UNSPECIFIED CHRONICITY PATTERN, UNSPECIFIED HEADACHE TYPE: Primary | ICD-10-CM

## 2024-08-12 LAB
ANION GAP SERPL CALC-SCNC: 6 MMOL/L (ref 0–18)
ATRIAL RATE: 65 BPM
BASOPHILS # BLD AUTO: 0.03 X10(3) UL (ref 0–0.2)
BASOPHILS NFR BLD AUTO: 0.7 %
BUN BLD-MCNC: 14 MG/DL (ref 9–23)
BUN/CREAT SERPL: 8.4 (ref 10–20)
CALCIUM BLD-MCNC: 9.4 MG/DL (ref 8.7–10.4)
CHLORIDE SERPL-SCNC: 109 MMOL/L (ref 98–112)
CO2 SERPL-SCNC: 28 MMOL/L (ref 21–32)
CREAT BLD-MCNC: 1.66 MG/DL
DEPRECATED RDW RBC AUTO: 42.6 FL (ref 35.1–46.3)
EGFRCR SERPLBLD CKD-EPI 2021: 47 ML/MIN/1.73M2 (ref 60–?)
EOSINOPHIL # BLD AUTO: 0.08 X10(3) UL (ref 0–0.7)
EOSINOPHIL NFR BLD AUTO: 1.9 %
ERYTHROCYTE [DISTWIDTH] IN BLOOD BY AUTOMATED COUNT: 13.9 % (ref 11–15)
GLUCOSE BLD-MCNC: 130 MG/DL (ref 70–99)
HCT VFR BLD AUTO: 42.3 %
HGB BLD-MCNC: 13.8 G/DL
IMM GRANULOCYTES # BLD AUTO: 0.01 X10(3) UL (ref 0–1)
IMM GRANULOCYTES NFR BLD: 0.2 %
LYMPHOCYTES # BLD AUTO: 1.12 X10(3) UL (ref 1–4)
LYMPHOCYTES NFR BLD AUTO: 26.2 %
MCH RBC QN AUTO: 27.2 PG (ref 26–34)
MCHC RBC AUTO-ENTMCNC: 32.6 G/DL (ref 31–37)
MCV RBC AUTO: 83.3 FL
MONOCYTES # BLD AUTO: 0.66 X10(3) UL (ref 0.1–1)
MONOCYTES NFR BLD AUTO: 15.5 %
NEUTROPHILS # BLD AUTO: 2.37 X10 (3) UL (ref 1.5–7.7)
NEUTROPHILS # BLD AUTO: 2.37 X10(3) UL (ref 1.5–7.7)
NEUTROPHILS NFR BLD AUTO: 55.5 %
OSMOLALITY SERPL CALC.SUM OF ELEC: 298 MOSM/KG (ref 275–295)
P AXIS: 44 DEGREES
P-R INTERVAL: 200 MS
PLATELET # BLD AUTO: 152 10(3)UL (ref 150–450)
PLATELETS.RETICULATED NFR BLD AUTO: 14 % (ref 0–7)
POTASSIUM SERPL-SCNC: 3.5 MMOL/L (ref 3.5–5.1)
Q-T INTERVAL: 446 MS
QRS DURATION: 94 MS
QTC CALCULATION (BEZET): 463 MS
R AXIS: -3 DEGREES
RBC # BLD AUTO: 5.08 X10(6)UL
SODIUM SERPL-SCNC: 143 MMOL/L (ref 136–145)
T AXIS: 25 DEGREES
VENTRICULAR RATE: 65 BPM
WBC # BLD AUTO: 4.3 X10(3) UL (ref 4–11)

## 2024-08-12 PROCEDURE — 96367 TX/PROPH/DG ADDL SEQ IV INF: CPT

## 2024-08-12 PROCEDURE — 99285 EMERGENCY DEPT VISIT HI MDM: CPT

## 2024-08-12 PROCEDURE — 85025 COMPLETE CBC W/AUTO DIFF WBC: CPT | Performed by: EMERGENCY MEDICINE

## 2024-08-12 PROCEDURE — 70551 MRI BRAIN STEM W/O DYE: CPT | Performed by: EMERGENCY MEDICINE

## 2024-08-12 PROCEDURE — 96375 TX/PRO/DX INJ NEW DRUG ADDON: CPT

## 2024-08-12 PROCEDURE — 93010 ELECTROCARDIOGRAM REPORT: CPT

## 2024-08-12 PROCEDURE — 96365 THER/PROPH/DIAG IV INF INIT: CPT

## 2024-08-12 PROCEDURE — 93005 ELECTROCARDIOGRAM TRACING: CPT

## 2024-08-12 PROCEDURE — 96366 THER/PROPH/DIAG IV INF ADDON: CPT

## 2024-08-12 PROCEDURE — 80048 BASIC METABOLIC PNL TOTAL CA: CPT | Performed by: EMERGENCY MEDICINE

## 2024-08-12 RX ORDER — MECLIZINE HYDROCHLORIDE 25 MG/1
25 TABLET ORAL 3 TIMES DAILY PRN
Qty: 15 TABLET | Refills: 0 | Status: SHIPPED | OUTPATIENT
Start: 2024-08-12 | End: 2024-08-17

## 2024-08-12 RX ORDER — MECLIZINE HYDROCHLORIDE 25 MG/1
25 TABLET ORAL ONCE
Status: COMPLETED | OUTPATIENT
Start: 2024-08-12 | End: 2024-08-12

## 2024-08-12 RX ORDER — METOCLOPRAMIDE 10 MG/1
10 TABLET ORAL EVERY 6 HOURS PRN
Qty: 20 TABLET | Refills: 0 | Status: SHIPPED | OUTPATIENT
Start: 2024-08-12 | End: 2024-08-17

## 2024-08-12 RX ORDER — DIPHENHYDRAMINE HYDROCHLORIDE 50 MG/ML
25 INJECTION INTRAMUSCULAR; INTRAVENOUS ONCE
Status: COMPLETED | OUTPATIENT
Start: 2024-08-12 | End: 2024-08-12

## 2024-08-12 RX ORDER — METOCLOPRAMIDE HYDROCHLORIDE 5 MG/ML
10 INJECTION INTRAMUSCULAR; INTRAVENOUS ONCE
Status: COMPLETED | OUTPATIENT
Start: 2024-08-12 | End: 2024-08-12

## 2024-08-12 RX ORDER — MAGNESIUM SULFATE HEPTAHYDRATE 40 MG/ML
2 INJECTION, SOLUTION INTRAVENOUS ONCE
Status: COMPLETED | OUTPATIENT
Start: 2024-08-12 | End: 2024-08-12

## 2024-08-12 RX ORDER — CLONIDINE HYDROCHLORIDE 0.1 MG/1
0.1 TABLET ORAL ONCE
Status: COMPLETED | OUTPATIENT
Start: 2024-08-12 | End: 2024-08-12

## 2024-08-12 NOTE — ED PROVIDER NOTES
Patient Seen in: [facility] Emergency Department    History     Chief Complaint   Patient presents with    Headache     Stated Complaint: headache, left sided weakness, pt feels off balance     HPI    59-year-old male with past medical history of hypertension, dyslipidemia, CAD, TIA with questionable left frontal cortical CVA which patient started on aspirin last year presenting for evaluation with cephalgia and sensation of feeling off balance.  Headache ongoing for the past week associate with several days of seemingly improving though ongoing balance issues.  No preceding traumatic/infectious complaints.  No vision loss or focal weakness/paresthesias.    Past Medical History:    Coronary atherosclerosis    CVA (cerebral vascular accident) (HCC)    Essential hypertension    High blood pressure    Hyperlipidemia       Past Surgical History:   Procedure Laterality Date    Cath drug eluting stent  02/13/2020    PTCA KIMBERLY LAD with kissing balloon PTCA LAD/Diag            No family history on file.    Social History     Socioeconomic History    Marital status:    Tobacco Use    Smoking status: Never    Smokeless tobacco: Never   Vaping Use    Vaping status: Never Used   Substance and Sexual Activity    Alcohol use: Yes     Comment: on rare occasions    Drug use: Never       Review of Systems :  Constitutional: As per HPI  Neurological: Negative for syncope; (+) headaches.     Positive for stated complaint: headache, left sided weakness, pt feels off balance  Other systems are as noted in HPI.  Constitutional and vital signs reviewed.      All other systems reviewed and negative except as noted above.    PSFH elements reviewed from today and agreed except as otherwise stated in HPI.    Physical Exam     ED Triage Vitals [08/12/24 0738]   BP (!) 209/115   Pulse 70   Resp 17   Temp 98 °F (36.7 °C)   Temp src    SpO2 98 %   O2 Device        Current:BP (!) 209/115   Pulse 70   Temp 98 °F (36.7 °C)   Resp 17    Wt 129.3 kg   SpO2 98%   BMI 37.60 kg/m²         Physical Exam   Constitutional: No distress.   HEENT: MMM.  Head: Normocephalic.   Eyes: No injection. No photophobia.  Neck: Neck supple. No meningismus.  Cardiovascular: RRR.   Pulmonary/Chest: Effort normal. CTAB.  Abdominal: Soft. Nontender.  Musculoskeletal: No gross deformity.  Neurological: Awake/alert. CN II-XII grossly intact. BUE/BLE proximally and distally with 5/5 strength.  Skin: Skin is warm.   Psychiatric: Cooperative.  Nursing note and vitals reviewed.        ED Course     Labs Reviewed   CBC WITH DIFFERENTIAL WITH PLATELET - Abnormal; Notable for the following components:       Result Value    Immature Platelet Fraction 14.0 (*)     All other components within normal limits   BASIC METABOLIC PANEL (8) - Abnormal; Notable for the following components:    Glucose 130 (*)     Creatinine 1.66 (*)     BUN/CREA Ratio 8.4 (*)     Calculated Osmolality 298 (*)     eGFR-Cr 47 (*)     All other components within normal limits   RAINBOW DRAW BLUE     EKG    Rate, intervals and axes as noted on EKG Report.  Rate: 65  Rhythm: Sinus Rhythm  Reading: NSR 65 without COURTNEY as independently interpreted by myself           MRI BRAIN WO ACUTE (3) SEQUENCE (CPT=70551)    Result Date: 8/12/2024  PROCEDURE: MRI BRAIN WO ACUTE (3) SEQUENCE(CPT=70551)  COMPARISON: [facility] [place], MRI BRAIN (CPT=70551), 7/08/2023, 8:50 AM.  INDICATIONS: headache, left sided weakness, pt feels off balance history of hypertension and coronary artery disease.  TECHNIQUE: Fast brain stroke protocol MRI.  A variety of imaging planes and parameters were utilized for visualization of suspected pathology.  Images were performed without contrast.  FINDINGS:  CEREBRUM: Chronic bilateral thalamic and basal ganglionic lacunar infarcts.  No edema, hemorrhage, mass or acute infarct.  Confluent pathologic signal in periventricular white matter, and subcortical white matter and centrum semiovale  bilaterally. CEREBELLUM: Multiple chronic bilateral cerebellar lacunar infarcts.  A larger chronic approximately 2.2 cm wedge-shaped infarct in the left cerebellum.  No edema, hemorrhage, mass or acute infarct. BRAINSTEM: Chronic left paramedian pontine lacunar infarct.  No edema, hemorrhage, mass or acute infarct. CSF SPACES: No hydrocephalus, subarachnoid hemorrhage, or mass.  SKULL: No mass or other significant visible lesion.  SINUSES: Limited views demonstrate no significant mucosal thickening or fluid.  ORBITS: Limited views are unremarkable.  OTHER: No restricted diffusion. Flow is present in the anterior and posterior circulation vessels.         CONCLUSION:  1. No acute infarct or hemorrhage. 2. Old left cerebellar infarct. 3. Old bilateral cerebellar lacunar infarcts related to small vessel disease. 4. Chronic thalamic and basal ganglionic infarcts related to small vessel disease. 5. Chronic left paramedian pontine lacunar infarct related to small vessel disease. 6. Age advanced changes of chronic small vessel disease in cerebral white matter.    Dictated by (CST): Wili Pennington MD on 8/12/2024 at 10:42 AM     Finalized by (CST): Wili Pennington MD on 8/12/2024 at 10:47 AM           ED Course as of 08/12/24 1424  ------------------------------------------------------------  Time: 08/12 1255  Comment: Improving  ------------------------------------------------------------  Time: 08/12 1303  Comment: Reseting comfortably, symptoms improving.       MDM   DIFFERENTIAL DIAGNOSIS: After history and physical exam differential diagnosis includes but is not limited to CVA, intracerebral hemorrhage, electrolyte derangement.    Pulse ox: 98%:Normal on RA, as independently interpreted by myself  Medical Decision Making  Evaluation for cephalgia associated with dizziness in neurologically intact patient - labs as noted, symptoms initially persisting though eventually improving after additional medication as noted with  nonacute MRI; stable for discharge [facility] symptomatic care and ongoing outpatient followup.    Problems Addressed:  Nonintractable headache, unspecified chronicity pattern, unspecified headache type: acute illness or injury    Amount and/or Complexity of Data Reviewed  External Data Reviewed: labs, radiology, ECG and notes.     Details: 7/7/2023 ED EKG/notes/labs/CTH reviewed  Labs: ordered. Decision-making details documented in ED Course.  Radiology: ordered and independent interpretation performed. Decision-making details documented in ED Course.     Details: MRI without obvious MLS/ICH as independently interpreted by myself  ECG/medicine tests: ordered and independent interpretation performed. Decision-making details documented in ED Course.    Risk  Prescription drug management.      I was wearing at minimum a facemask and eye protection throughout this encounter with handwashing performed prior and after patient evaluation without personal hand/facial/oropharyngeal contact and gloves worn throughout encounter. See note and/or contact this provider for further PPE details.      Disposition and Plan     Clinical Impression:  1. Nonintractable headache, unspecified chronicity pattern, unspecified headache type        Disposition:  Discharge    Follow-up:  Cecil Thomas MD  [address] [place] [address] [id number] [phone]    Call  For neurology followup and re-evaluation.      Medications Prescribed:  Discharge Medication List as of 8/12/2024  1:37 PM        START taking these medications    Details   metoclopramide 10 MG Oral Tab Take 1 tablet (10 mg total) by mouth every 6 (six) hours as needed (For headache/nausea/vomiting.)., Normal, Disp-20 tablet, R-0      meclizine 25 MG Oral Tab Take 1 tablet (25 mg total) by mouth 3 (three) times daily as needed for Dizziness., Normal, Disp-15 tablet, R-0

## 2024-08-12 NOTE — ED QUICK NOTES
No changes from my initial assessment, continues to await MRI. Warm blanket provided on request.

## 2024-08-12 NOTE — ED QUICK NOTES
Assumed care of Martha from triage, c/o L sided headache with dizziness intermittent for the past week or so. Reported hx of stroke with residual balance/memory difficulties at times. States he is not always compliant with his BP medication, last dose was yesterday morning. Currently rating headache 4/10. No focal neuro deficits, equal strength to all extremities. He is awake, alert and pleasantly conversational. Dr Manuel at bedside, plans for MRI.

## 2024-08-12 NOTE — ED QUICK NOTES
Infusion completed. Headache is resolved at this time. Discharge instructions and prescriptions reviewed with Mr Garcia. Encouraged to take blood pressure medication as previously prescribed and to follow-up with both primary care and neurology as discussed. Instructed to return to ED for any new or recurrent symptoms.

## 2024-11-11 ENCOUNTER — APPOINTMENT (OUTPATIENT)
Dept: ULTRASOUND IMAGING | Facility: HOSPITAL | Age: 59
End: 2024-11-11
Attending: EMERGENCY MEDICINE
Payer: COMMERCIAL

## 2024-11-11 ENCOUNTER — APPOINTMENT (OUTPATIENT)
Dept: MRI IMAGING | Facility: HOSPITAL | Age: 59
End: 2024-11-11
Attending: HOSPITALIST
Payer: COMMERCIAL

## 2024-11-11 ENCOUNTER — HOSPITAL ENCOUNTER (INPATIENT)
Facility: HOSPITAL | Age: 59
LOS: 1 days | Discharge: HOME | End: 2024-11-14
Attending: EMERGENCY MEDICINE | Admitting: HOSPITALIST
Payer: COMMERCIAL

## 2024-11-11 DIAGNOSIS — R55 SYNCOPE AND COLLAPSE: ICD-10-CM

## 2024-11-11 DIAGNOSIS — M79.89 LEG SWELLING: Primary | ICD-10-CM

## 2024-11-11 DIAGNOSIS — R15.9 INCONTINENCE OF FECES: ICD-10-CM

## 2024-11-11 LAB
ANION GAP SERPL CALC-SCNC: 7 MMOL/L (ref 0–18)
ATRIAL RATE: 71 BPM
BASOPHILS # BLD AUTO: 0.04 X10(3) UL (ref 0–0.2)
BASOPHILS NFR BLD AUTO: 0.7 %
BILIRUB UR QL: NEGATIVE
BNP SERPL-MCNC: 62 PG/ML (ref ?–100)
BUN BLD-MCNC: 13 MG/DL (ref 9–23)
BUN/CREAT SERPL: 7.9 (ref 10–20)
CALCIUM BLD-MCNC: 9.8 MG/DL (ref 8.7–10.4)
CHLORIDE SERPL-SCNC: 109 MMOL/L (ref 98–112)
CLARITY UR: CLEAR
CO2 SERPL-SCNC: 27 MMOL/L (ref 21–32)
COLOR UR: COLORLESS
CREAT BLD-MCNC: 1.65 MG/DL
DEPRECATED RDW RBC AUTO: 42.9 FL (ref 35.1–46.3)
EGFRCR SERPLBLD CKD-EPI 2021: 47 ML/MIN/1.73M2 (ref 60–?)
EOSINOPHIL # BLD AUTO: 0.11 X10(3) UL (ref 0–0.7)
EOSINOPHIL NFR BLD AUTO: 1.9 %
ERYTHROCYTE [DISTWIDTH] IN BLOOD BY AUTOMATED COUNT: 14.3 % (ref 11–15)
GLUCOSE BLD-MCNC: 115 MG/DL (ref 70–99)
GLUCOSE BLDC GLUCOMTR-MCNC: 120 MG/DL (ref 70–99)
GLUCOSE UR-MCNC: NORMAL MG/DL
HCT VFR BLD AUTO: 41.7 %
HGB BLD-MCNC: 13.7 G/DL
HGB UR QL STRIP.AUTO: NEGATIVE
IMM GRANULOCYTES # BLD AUTO: 0.02 X10(3) UL (ref 0–1)
IMM GRANULOCYTES NFR BLD: 0.3 %
KETONES UR-MCNC: NEGATIVE MG/DL
LEUKOCYTE ESTERASE UR QL STRIP.AUTO: NEGATIVE
LYMPHOCYTES # BLD AUTO: 0.91 X10(3) UL (ref 1–4)
LYMPHOCYTES NFR BLD AUTO: 15.4 %
MCH RBC QN AUTO: 27.1 PG (ref 26–34)
MCHC RBC AUTO-ENTMCNC: 32.9 G/DL (ref 31–37)
MCV RBC AUTO: 82.4 FL
MONOCYTES # BLD AUTO: 0.76 X10(3) UL (ref 0.1–1)
MONOCYTES NFR BLD AUTO: 12.8 %
NEUTROPHILS # BLD AUTO: 4.08 X10 (3) UL (ref 1.5–7.7)
NEUTROPHILS # BLD AUTO: 4.08 X10(3) UL (ref 1.5–7.7)
NEUTROPHILS NFR BLD AUTO: 68.9 %
NITRITE UR QL STRIP.AUTO: NEGATIVE
OSMOLALITY SERPL CALC.SUM OF ELEC: 297 MOSM/KG (ref 275–295)
P AXIS: 25 DEGREES
P-R INTERVAL: 142 MS
PH UR: 6.5 [PH] (ref 5–8)
PLATELET # BLD AUTO: 141 10(3)UL (ref 150–450)
PLATELETS.RETICULATED NFR BLD AUTO: 16.6 % (ref 0–7)
POTASSIUM SERPL-SCNC: 3.9 MMOL/L (ref 3.5–5.1)
PROT UR-MCNC: NEGATIVE MG/DL
Q-T INTERVAL: 408 MS
QRS DURATION: 96 MS
QTC CALCULATION (BEZET): 443 MS
R AXIS: 12 DEGREES
RBC # BLD AUTO: 5.06 X10(6)UL
SODIUM SERPL-SCNC: 143 MMOL/L (ref 136–145)
SP GR UR STRIP: 1.01 (ref 1–1.03)
T AXIS: 25 DEGREES
TROPONIN I SERPL HS-MCNC: 49 NG/L
UROBILINOGEN UR STRIP-ACNC: NORMAL
VENTRICULAR RATE: 71 BPM
WBC # BLD AUTO: 5.9 X10(3) UL (ref 4–11)

## 2024-11-11 PROCEDURE — 72148 MRI LUMBAR SPINE W/O DYE: CPT | Performed by: HOSPITALIST

## 2024-11-11 PROCEDURE — 70551 MRI BRAIN STEM W/O DYE: CPT | Performed by: HOSPITALIST

## 2024-11-11 PROCEDURE — 93971 EXTREMITY STUDY: CPT | Performed by: EMERGENCY MEDICINE

## 2024-11-11 RX ORDER — FUROSEMIDE 20 MG/1
20 TABLET ORAL DAILY
Qty: 3 TABLET | Refills: 0 | Status: SHIPPED | OUTPATIENT
Start: 2024-11-11 | End: 2024-11-11

## 2024-11-11 RX ORDER — METOPROLOL TARTRATE 100 MG/1
100 TABLET ORAL
Status: DISCONTINUED | OUTPATIENT
Start: 2024-11-11 | End: 2024-11-14

## 2024-11-11 RX ORDER — AMLODIPINE BESYLATE 10 MG/1
10 TABLET ORAL DAILY
Status: DISCONTINUED | OUTPATIENT
Start: 2024-11-11 | End: 2024-11-12

## 2024-11-11 RX ORDER — ATORVASTATIN CALCIUM 80 MG/1
80 TABLET, FILM COATED ORAL NIGHTLY
Status: DISCONTINUED | OUTPATIENT
Start: 2024-11-11 | End: 2024-11-14

## 2024-11-11 RX ORDER — SPIRONOLACTONE 25 MG/1
25 TABLET ORAL DAILY
Status: DISCONTINUED | OUTPATIENT
Start: 2024-11-12 | End: 2024-11-14

## 2024-11-11 RX ORDER — ACETAMINOPHEN 500 MG
1000 TABLET ORAL EVERY 6 HOURS PRN
Status: DISCONTINUED | OUTPATIENT
Start: 2024-11-11 | End: 2024-11-14

## 2024-11-11 RX ORDER — ASPIRIN 81 MG/1
81 TABLET ORAL DAILY
Status: DISCONTINUED | OUTPATIENT
Start: 2024-11-11 | End: 2024-11-14

## 2024-11-11 RX ORDER — FUROSEMIDE 10 MG/ML
40 INJECTION INTRAMUSCULAR; INTRAVENOUS ONCE
Status: COMPLETED | OUTPATIENT
Start: 2024-11-11 | End: 2024-11-11

## 2024-11-11 RX ORDER — HYDRALAZINE HYDROCHLORIDE 25 MG/1
25 TABLET, FILM COATED ORAL EVERY 8 HOURS PRN
Status: DISCONTINUED | OUTPATIENT
Start: 2024-11-11 | End: 2024-11-14

## 2024-11-11 RX ORDER — ONDANSETRON 2 MG/ML
4 INJECTION INTRAMUSCULAR; INTRAVENOUS EVERY 6 HOURS PRN
Status: DISCONTINUED | OUTPATIENT
Start: 2024-11-11 | End: 2024-11-14

## 2024-11-11 RX ORDER — BISACODYL 10 MG
10 SUPPOSITORY, RECTAL RECTAL
Status: DISCONTINUED | OUTPATIENT
Start: 2024-11-11 | End: 2024-11-14

## 2024-11-11 RX ORDER — EZETIMIBE 10 MG/1
10 TABLET ORAL DAILY
COMMUNITY
Start: 2023-11-30 | End: 2024-11-24

## 2024-11-11 RX ORDER — HYDRALAZINE HYDROCHLORIDE 20 MG/ML
10 INJECTION INTRAMUSCULAR; INTRAVENOUS ONCE
Status: COMPLETED | OUTPATIENT
Start: 2024-11-11 | End: 2024-11-11

## 2024-11-11 RX ORDER — POLYETHYLENE GLYCOL 3350 17 G/17G
17 POWDER, FOR SOLUTION ORAL DAILY PRN
Status: DISCONTINUED | OUTPATIENT
Start: 2024-11-11 | End: 2024-11-14

## 2024-11-11 RX ORDER — LISINOPRIL 20 MG/1
20 TABLET ORAL NIGHTLY
Status: DISCONTINUED | OUTPATIENT
Start: 2024-11-11 | End: 2024-11-12

## 2024-11-11 RX ORDER — CARVEDILOL 25 MG/1
25 TABLET ORAL 2 TIMES DAILY
COMMUNITY
Start: 2023-11-30 | End: 2024-11-11 | Stop reason: CLARIF

## 2024-11-11 RX ORDER — CLONIDINE HYDROCHLORIDE 0.1 MG/1
0.1 TABLET ORAL 3 TIMES DAILY
Status: DISCONTINUED | OUTPATIENT
Start: 2024-11-11 | End: 2024-11-12

## 2024-11-11 RX ORDER — SENNOSIDES 8.6 MG
17.2 TABLET ORAL NIGHTLY PRN
Status: DISCONTINUED | OUTPATIENT
Start: 2024-11-11 | End: 2024-11-14

## 2024-11-11 RX ORDER — POTASSIUM CHLORIDE 750 MG/1
10 TABLET, EXTENDED RELEASE ORAL 2 TIMES DAILY
Qty: 6 TABLET | Refills: 0 | Status: SHIPPED | OUTPATIENT
Start: 2024-11-11 | End: 2024-11-11

## 2024-11-11 NOTE — ED QUICK NOTES
Pt transported to MRI via cart accompanied by MRI tech.  Pt left dept in NAD, VSS, A&O x 4.  Pt belongings verified w/ pt & accompanying pt to floor.  MRI notified to send pt to clean and ready floor bed upon completion of MRI.     Heart failure

## 2024-11-11 NOTE — ED INITIAL ASSESSMENT (HPI)
Bilateral leg swelling for a few days . Also reports urinary incontinence x 3 days. Denies having these issues in the past .   Denies diuretic use, denies SOB

## 2024-11-11 NOTE — H&P
[facility] Hospitalist H&P       CC:   Chief Complaint   Patient presents with    Swelling Edema        PCP: Guerrero Borrero MD    History of Present Illness: Mr. Garcia is a 59 year old male with PMH sig for Uncontrolled HTN, OA, chronic HFpEF, HLD, CAD s/p PCI of LAD, hs of stroke, who presents with bilateral leg swelling, urinary incontinence.  Patient is a poor historian, but states that for the past few day he has been intermittently incontinent of urine, at times noting his underwear and pants are soaked, and other times notes the urge but can't make it to the bathroom in time.  He denies burning or pain, no abd pain or back pain, no fevers or chills.  He also notes an episode while in the ER of incontinence of stool he went to check his pants and noted stool in his underwear, this hasn't happened before, denies diarrhea.  He has no lower ext weakness, no pain in his spine, no other acute findings.  He did mention a 3 day episode of urinary incontinence last year this time that resolved on its own without intervention.   He also notes increasing lower ext swelling of bilateral lower ext with R>L over the last few months.  He denies CP or sob, no orthopnea, not other complaints.  In the ER he was getting up to walk to the bathroom felt lightheaded and dizzy and had a syncopal episode, denies CP or sob, denies syncope at home.      He notes being non-compliant with most of his medications, takes them once in a while, dose not follow a specific diet.         PMH  Past Medical History:    Coronary atherosclerosis    CVA (cerebral vascular accident) (HCC)    Essential hypertension    High blood pressure    Hyperlipidemia        PSH  Past Surgical History:   Procedure Laterality Date    Cath drug eluting stent  02/13/2020    PTCA KIMBERLY LAD with kissing balloon PTCA LAD/Diag        ALL:  Allergies[1]     Home Medications:  Medications Taking[2]      Soc Hx  Social History     Tobacco Use    Smoking  status: Never    Smokeless tobacco: Never   Substance Use Topics    Alcohol use: Yes     Comment: on rare occasions        Fam Hx  No family history on file.    Review of Systems  Comprehensive ROS reviewed and negative except for what's stated above.     OBJECTIVE:  BP (!) 152/111   Pulse 107   Temp 98 °F (36.7 °C) (Oral)   Resp 19   Ht 6' 2\" (1.88 m)   Wt 285 lb (129.3 kg)   SpO2 95%   BMI 36.59 kg/m²   General:  Alert, no distress   Head:  Normocephalic, without obvious abnormality, atraumatic.   Eyes:  Sclera anicteric, No conjunctival pallor,     Nose: Nares normal. Septum midline.    Throat: Lips, mucosa, and tongue normal. Teeth and gums normal.   Neck: Supple,    Lungs:   Clear to auscultation bilaterally. Normal effort   Chest wall:  No tenderness or deformity.   Heart:  Regular rate and rhythm    Abdomen:   Soft, non-tender. Bowel sounds normal.     Extremities: Extremities normal, + 1-2 edema bilateral lower ext   Rectal exam: Brown stool noted on underwear and near rectum patient not aware of this, rectal tone appropriate    Skin: Skin color, texture, turgor normal. No rashes or lesions.    Neurologic: Normal strength, no focal deficit appreciated     Diagnostic Data:    CBC/Chem  Recent Labs   Lab 11/11/24  0908   WBC 5.9   HGB 13.7   MCV 82.4   .0*       Recent Labs   Lab 11/11/24  0908      K 3.9      CO2 27.0   BUN 13   CREATSERUM 1.65*   *   CA 9.8       No results for input(s): \"ALT\", \"AST\", \"ALB\", \"AMYLASE\", \"LIPASE\", \"LDH\" in the last 168 hours.    Invalid input(s): \"ALPHOS\", \"TBIL\", \"DBIL\", \"TPROT\"    No results for input(s): \"TROP\" in the last 168 hours.       Radiology: US VENOUS DOPPLER LEG RIGHT - DIAG IMG (CPT=93971)    Result Date: 11/11/2024  CONCLUSION:  1. Normal right leg venous duplex exam. 2. No deep venous thrombosis.    Dictated by (CST): Inder Ch MD on 11/11/2024 at 9:46 AM     Finalized by (CST): Inder Ch MD on 11/11/2024 at  9:47 AM             ASSESSMENT / PLAN:   Mr. Garcia is a 59 year old male with PMH sig for Uncontrolled HTN, OA, chronic HFpEF, HLD, CAD s/p PCI of LAD, hs of stroke, who presents with bilateral leg swelling, urinary incontinence.     Urinary and bowel incontinence  Dizziness / slurred speech   - notes 3-4 days of intermittent urinary incontinence PTA  - had episode of bowel incontinence in ER, stool also noted on underwear during rectal exam  - denies saddle anesthesia   - rectal exam with normal rectal tone  - no lower ext weakness, no back pain   - admits to intermittent slurred speech for the last year (since his stroke in 2023) not sig worse today  - this afternoon notes feeling lightheaded and dizzy   - UA negative, no signs of infection  - Neurology consulted, discussed with neuro  - Plan for Stat MRI, Lumbar spine MRI  - EEG ordered, to rule out seizure activity      Syncope  - trop negative  - no focal weakness  - tele continued  - check ECHO  - MRI as above    Bilateral leg swell  - venous doppler negative  - UA without proteinuria  - BNP normal  - venous insuff? Compression stocking  - gentle diuresis     HTN urgency  - patient is admittedly non-compliant with his BP medications  - not consistent with any BP medications  - med rec reviewed will resume prior home med list    Chronic HFpEF  - BNP normal  - will check ECHO  - BP control    CAD  - hs of stent in 2020  - resume aspirin, statin  - check lipids  - BB resumed as well    CKD  - cre 1.65  - has 1.3-1.6  - likely due to uncontrolled HTN    TCP  - plts 141  - normal prior  - repeat in am    HLD  - check lipids  - resume statin    Hs of stroke  - MRI as above  - asa, statin     Non compliance   - discussed at length with patient and wife, importance of close medication adherence and follow up given hs of CAD, CHF, Strokes, HTN  - stated his understanding.    FN:  - IVF: none  - Diet: adv    DVT Prophy:scd, heparin (pending MRI findings)  Lines:  PIV    Dispo: pending clinical course    Discussed with wife at bedside    Outpatient records or previous hospital records reviewed.     Further recommendations pending patient's clinical course.  [facility] hospitalist to continue to follow patient while in house    Patient and/or patient's family given opportunity to ask questions and note understanding and agreeing with therapeutic plan as outlined    Thank You,  Oswaldo Qureshi MD    Hospitalist with [facility] Service number: [phone]         [1] No Known Allergies  [2]   Outpatient Medications Marked as Taking for the 11/11/24 encounter (Hospital Encounter)   Medication Sig Dispense Refill    furosemide 20 MG Oral Tab Take 1 tablet (20 mg total) by mouth daily for 3 days. 3 tablet 0    potassium chloride 10 MEQ Oral Tab CR Take 1 tablet (10 mEq total) by mouth 2 (two) times daily for 3 days. 6 tablet 0

## 2024-11-11 NOTE — ED QUICK NOTES
Orders for admission, patient is aware of plan and ready to go upstairs. Any questions, please call ED RN Izabel at extension [phone].     Patient Covid vaccination status: Unvaccinated     COVID Test Ordered in ED: None    COVID Suspicion at Admission: N/A    Running Infusions:  None    Mental Status/LOC at time of transport: A&O x 4    Other pertinent information:   CIWA score: N/A   NIH score:  N/A

## 2024-11-11 NOTE — ED QUICK NOTES
Pts wife at bedside and reporting she has questions.  This RN attempted to answer questions/concerns, but pts wife requesting to speak w/ MD.  Dr. yLnn at bedside.

## 2024-11-11 NOTE — ED QUICK NOTES
Pt eating lunch tray provided.  Pt in NAD, VSS, denies any new needs or complaints at this time.  Ok for pt DC after pt eats per Dr. Voss.

## 2024-11-11 NOTE — ED QUICK NOTES
ERT at bedside to attempt to ambulate pt again.  Pt continues to c/o dizziness.  Dr. Lynn notified.  Pt to be admitted for obs.

## 2024-11-11 NOTE — ED PROVIDER NOTES
Patient Seen in: [facility] Emergency Department    History     Chief Complaint   Patient presents with    Swelling Edema     Stated Complaint: swollen legs/urinary incontinence    HPI    Patient complains of swollen right leg for several days.  No chest pain or shortness of breath.  Also complains of some urinary frequency.  States he has been incontinent of urine couple of times..    Alleviating factors: none  Exacerbating factors: none  Denies dyspnea exertion orthopnea can lay flat with no trouble.    Past Medical History:    Coronary atherosclerosis    CVA (cerebral vascular accident) (HCC)    Essential hypertension    High blood pressure    Hyperlipidemia       Past Surgical History:   Procedure Laterality Date    Cath drug eluting stent  02/13/2020    PTCA KIMBERLY LAD with kissing balloon PTCA LAD/Diag            No family history on file.    Social History     Socioeconomic History    Marital status:    Tobacco Use    Smoking status: Never    Smokeless tobacco: Never   Vaping Use    Vaping status: Never Used   Substance and Sexual Activity    Alcohol use: Yes     Comment: on rare occasions    Drug use: Never       Review of Systems    Positive for stated complaint: swollen legs/urinary incontinence  Other systems are as noted in HPI.  Constitutional and vital signs reviewed.      All other systems reviewed and negative except as noted above.    PSFH elements reviewed from today and agreed except as otherwise stated in HPI.    Physical Exam     ED Triage Vitals [11/11/24 0816]   BP (!) 195/115   Pulse 93   Resp 18   Temp 98 °F (36.7 °C)   Temp src Oral   SpO2 97 %   O2 Device None (Room air)       Current:BP (!) 144/97   Pulse 94   Temp 98 °F (36.7 °C) (Oral)   Resp 16   Ht 188 cm (6' 2\")   Wt 129.3 kg   SpO2 97%   BMI 36.59 kg/m²    PULSE OX nl  GENERAL: awake alert  HEAD: normocephalic, atraumatic,   EYES: PERRLA, EOMI, conj sclera clear  THROAT: mmm, no lesions  NECK: supple, no  meningeal signs  LUNGS: no resp distress, cta bilateral  CARDIO: RRR without murmur  GI: abdomen is soft and non tender, no masses, nl bowel sounds   EXTREMITIES: from, 5/5 strength in all 4 ext, r lower leg 2+ edema  NEURO: alert and oiented *3, 2-12 intact, no focal deficit noted  SKIN: good skin turgor, no  rashes  PSYCH: calm, cooperative,    Differential includes:dvt vs venous insufficiency vs. chf    ED Course     Labs Reviewed   CBC WITH DIFFERENTIAL WITH PLATELET - Abnormal; Notable for the following components:       Result Value    .0 (*)     Immature Platelet Fraction 16.6 (*)     Lymphocyte Absolute 0.91 (*)     All other components within normal limits   BASIC METABOLIC PANEL (8) - Abnormal; Notable for the following components:    Glucose 115 (*)     Creatinine 1.65 (*)     BUN/CREA Ratio 7.9 (*)     Calculated Osmolality 297 (*)     eGFR-Cr 47 (*)     All other components within normal limits   URINALYSIS WITH CULTURE REFLEX - Abnormal; Notable for the following components:    Urine Color Colorless (*)     All other components within normal limits   POCT GLUCOSE - Abnormal; Notable for the following components:    POC Glucose  120 (*)     All other components within normal limits   BNP (B TYPE NATRIURETIC PEPTIDE) - Normal   TROPONIN I HIGH SENSITIVITY - Normal   SCAN SLIDE     EKG    Rate, intervals and axes as noted on EKG Report.  Rate: 72  Rhythm: Sinus Rhythm  Reading: nsr with lvh           MDM       Cardiac Monitor:   Pulse Readings from Last 1 Encounters:   11/11/24 94   , sinus, 88  interpreted by me.    Radiology findings:   US VENOUS DOPPLER LEG RIGHT - DIAG IMG (CPT=93971)    Result Date: 11/11/2024  CONCLUSION:  1. Normal right leg venous duplex exam. 2. No deep venous thrombosis.    Dictated by (CST): Inder Ch MD on 11/11/2024 at 9:46 AM     Finalized by (CST): Inder Ch MD on 11/11/2024 at 9:47 AM               Medical Decision Making  Patient's blood  pressure improved with medication.  Ultrasound negative for DVT.  Patient was being discharged while standing for prolonged period he had a syncopal event.  No injury he was sweaty diaphoretic placed in the bed became more alert oriented denies injury.  Blood pressure normal.    Problems Addressed:  Leg swelling: acute illness or injury  Syncope and collapse: acute illness or injury    Amount and/or Complexity of Data Reviewed  Labs: ordered. Decision-making details documented in ED Course.  Radiology: ordered. Decision-making details documented in ED Course.  ECG/medicine tests: ordered and independent interpretation performed. Decision-making details documented in ED Course.    Risk  Prescription drug management.        Disposition and Plan     Clinical Impression:  1. Leg swelling    2. Syncope and collapse        Disposition:  Discharge    Follow-up:  Guerrero Borrero MD  [address] [place], [place] [id number] [phone]    Follow up        Medications Prescribed:  Current Discharge Medication List        START taking these medications    Details   furosemide 20 MG Oral Tab Take 1 tablet (20 mg total) by mouth daily for 3 days.  Qty: 3 tablet, Refills: 0      potassium chloride 10 MEQ Oral Tab CR Take 1 tablet (10 mEq total) by mouth 2 (two) times daily for 3 days.  Qty: 6 tablet, Refills: 0

## 2024-11-11 NOTE — ED QUICK NOTES
Delayed entry d/t pt care.  Pt was provided w/ DC paperwork and stable for DC.  Pt was getting dressed in room and had a syncopal episode.  Pt was found on room floor by bathroom on his buttocks and diaphoretic.  Pt denies hitting head.  MD and public safety at bedside for lift assist.  Pt assisted back to cart safely.  EKG & accu check obtained per orders.  Pt connected to cont ECG, pulse ox & BP monitoring.  Per MD, monitor pt at this time, no new orders.  Pt provided w/ juice and crackers.

## 2024-11-11 NOTE — ED QUICK NOTES
Delayed entry d/t pt care.  Upon standing up, pt reporting he felt \"faint\" and \"dizzy.\"  Pt also had episode of bowel incontinence.  No neuro deficits noted.  Pt assisted back to cart safely.  Dr. Lynn notified.

## 2024-11-11 NOTE — ED QUICK NOTES
Pt ambulatory to ED tx room 22 from triage.   Pt A&O x 4, answering all ?s appropriately.   Pt connected to cont ECG, pulse ox & BP.   Pt here w/ c/o: BLE swelling, R > L.  Pt also endorsing some urinary incontinence.  Pt denies any other medical complaints.   Cart in low/locked position, side rails up x 2, call light w/ in reach.

## 2024-11-12 ENCOUNTER — APPOINTMENT (OUTPATIENT)
Dept: CV DIAGNOSTICS | Facility: HOSPITAL | Age: 59
End: 2024-11-12
Attending: HOSPITALIST
Payer: COMMERCIAL

## 2024-11-12 PROBLEM — R15.9 INCONTINENCE OF FECES: Status: ACTIVE | Noted: 2024-11-12

## 2024-11-12 LAB
ALBUMIN SERPL-MCNC: 4.2 G/DL (ref 3.2–4.8)
ALBUMIN/GLOB SERPL: 1.3 {RATIO} (ref 1–2)
ALP LIVER SERPL-CCNC: 76 U/L
ALT SERPL-CCNC: 13 U/L
ANION GAP SERPL CALC-SCNC: 10 MMOL/L (ref 0–18)
AST SERPL-CCNC: 16 U/L (ref ?–34)
BASOPHILS # BLD AUTO: 0.03 X10(3) UL (ref 0–0.2)
BASOPHILS NFR BLD AUTO: 0.5 %
BILIRUB SERPL-MCNC: 1 MG/DL (ref 0.2–1.1)
BUN BLD-MCNC: 15 MG/DL (ref 9–23)
BUN/CREAT SERPL: 9.9 (ref 10–20)
CALCIUM BLD-MCNC: 9.7 MG/DL (ref 8.7–10.4)
CHLORIDE SERPL-SCNC: 110 MMOL/L (ref 98–112)
CHOLEST SERPL-MCNC: 227 MG/DL (ref ?–200)
CO2 SERPL-SCNC: 25 MMOL/L (ref 21–32)
CREAT BLD-MCNC: 1.51 MG/DL
DEPRECATED RDW RBC AUTO: 41.7 FL (ref 35.1–46.3)
EGFRCR SERPLBLD CKD-EPI 2021: 52 ML/MIN/1.73M2 (ref 60–?)
EOSINOPHIL # BLD AUTO: 0.08 X10(3) UL (ref 0–0.7)
EOSINOPHIL NFR BLD AUTO: 1.3 %
ERYTHROCYTE [DISTWIDTH] IN BLOOD BY AUTOMATED COUNT: 14.2 % (ref 11–15)
GLOBULIN PLAS-MCNC: 3.3 G/DL (ref 2–3.5)
GLUCOSE BLD-MCNC: 113 MG/DL (ref 70–99)
HCT VFR BLD AUTO: 41.3 %
HDLC SERPL-MCNC: 32 MG/DL (ref 40–59)
HGB BLD-MCNC: 14 G/DL
IMM GRANULOCYTES # BLD AUTO: 0.03 X10(3) UL (ref 0–1)
IMM GRANULOCYTES NFR BLD: 0.5 %
LDLC SERPL CALC-MCNC: 174 MG/DL (ref ?–100)
LYMPHOCYTES # BLD AUTO: 1.19 X10(3) UL (ref 1–4)
LYMPHOCYTES NFR BLD AUTO: 19.8 %
MAGNESIUM SERPL-MCNC: 2.2 MG/DL (ref 1.6–2.6)
MCH RBC QN AUTO: 27.7 PG (ref 26–34)
MCHC RBC AUTO-ENTMCNC: 33.9 G/DL (ref 31–37)
MCV RBC AUTO: 81.6 FL
MONOCYTES # BLD AUTO: 0.81 X10(3) UL (ref 0.1–1)
MONOCYTES NFR BLD AUTO: 13.5 %
NEUTROPHILS # BLD AUTO: 3.86 X10 (3) UL (ref 1.5–7.7)
NEUTROPHILS # BLD AUTO: 3.86 X10(3) UL (ref 1.5–7.7)
NEUTROPHILS NFR BLD AUTO: 64.4 %
NONHDLC SERPL-MCNC: 195 MG/DL (ref ?–130)
OSMOLALITY SERPL CALC.SUM OF ELEC: 302 MOSM/KG (ref 275–295)
PLATELET # BLD AUTO: 146 10(3)UL (ref 150–450)
POTASSIUM SERPL-SCNC: 3.7 MMOL/L (ref 3.5–5.1)
PROT SERPL-MCNC: 7.5 G/DL (ref 5.7–8.2)
RBC # BLD AUTO: 5.06 X10(6)UL
SODIUM SERPL-SCNC: 145 MMOL/L (ref 136–145)
TRIGL SERPL-MCNC: 113 MG/DL (ref 30–149)
VLDLC SERPL CALC-MCNC: 23 MG/DL (ref 0–30)
WBC # BLD AUTO: 6 X10(3) UL (ref 4–11)

## 2024-11-12 PROCEDURE — 93306 TTE W/DOPPLER COMPLETE: CPT | Performed by: HOSPITALIST

## 2024-11-12 PROCEDURE — 95816 EEG AWAKE AND DROWSY: CPT | Performed by: INTERNAL MEDICINE

## 2024-11-12 PROCEDURE — 99223 1ST HOSP IP/OBS HIGH 75: CPT | Performed by: OTHER

## 2024-11-12 RX ORDER — HYDROCODONE BITARTRATE AND ACETAMINOPHEN 10; 325 MG/1; MG/1
1 TABLET ORAL EVERY 6 HOURS PRN
Status: DISCONTINUED | OUTPATIENT
Start: 2024-11-12 | End: 2024-11-14

## 2024-11-12 RX ORDER — CLONIDINE HYDROCHLORIDE 0.2 MG/1
0.2 TABLET ORAL 2 TIMES DAILY
Status: DISCONTINUED | OUTPATIENT
Start: 2024-11-12 | End: 2024-11-14

## 2024-11-12 RX ORDER — TRAMADOL HYDROCHLORIDE 50 MG/1
50 TABLET ORAL EVERY 6 HOURS PRN
Status: DISCONTINUED | OUTPATIENT
Start: 2024-11-12 | End: 2024-11-12

## 2024-11-12 RX ORDER — MORPHINE SULFATE 2 MG/ML
2 INJECTION, SOLUTION INTRAMUSCULAR; INTRAVENOUS ONCE
Status: DISCONTINUED | OUTPATIENT
Start: 2024-11-12 | End: 2024-11-13

## 2024-11-12 RX ORDER — AMLODIPINE BESYLATE 5 MG/1
5 TABLET ORAL DAILY
Status: DISCONTINUED | OUTPATIENT
Start: 2024-11-13 | End: 2024-11-14

## 2024-11-12 RX ORDER — MORPHINE SULFATE 2 MG/ML
2 INJECTION, SOLUTION INTRAMUSCULAR; INTRAVENOUS EVERY 4 HOURS PRN
Status: DISCONTINUED | OUTPATIENT
Start: 2024-11-12 | End: 2024-11-13

## 2024-11-12 RX ORDER — TRAMADOL HYDROCHLORIDE 50 MG/1
50 TABLET ORAL EVERY 12 HOURS PRN
Status: DISCONTINUED | OUTPATIENT
Start: 2024-11-12 | End: 2024-11-14

## 2024-11-12 RX ORDER — LISINOPRIL 40 MG/1
40 TABLET ORAL NIGHTLY
Status: DISCONTINUED | OUTPATIENT
Start: 2024-11-12 | End: 2024-11-14

## 2024-11-12 NOTE — OCCUPATIONAL THERAPY NOTE
OCCUPATIONAL THERAPY EVALUATION - INPATIENT     Room Number: 513/513-A  Evaluation Date: 11/12/2024  Type of Evaluation: Initial  Presenting Problem: dizziness, b/l leg swelling, urinary and bowel incontinence (hx of CVA)    Physician Order: IP Consult to Occupational Therapy  Reason for Therapy: ADL/IADL Dysfunction and Discharge Planning    OCCUPATIONAL THERAPY ASSESSMENT   Patient is a 59 year old male admitted 11/11/2024 for dizziness, b/l leg swelling, urinary and bowel incontinence (hx of CVA).  Prior to admission, patient's baseline is independent , working.  Patient is currently functioning near baseline with ADLs and functional mobility.  Patient is requiring CGA as a result of the following impairments: leg pain (chronic per pt), decreased dynamic standing balance, limited reach. Occupational Therapy will continue to follow for duration of hospitalization.    Patient will benefit from continued skilled OT Services while in-house. No anticipated OT needs at discharge.     PLAN DURING HOSPITALIZATION  OT Device Recommendations: TBD  OT Treatment Plan: Balance activities;Energy conservation/work simplification techniques;ADL training;Functional transfer training;Endurance training;Equipment eval/education;Compensatory technique education     OCCUPATIONAL THERAPY MEDICAL/SOCIAL HISTORY   Problem List  Principal Problem:    Leg swelling  Active Problems:    Syncope and collapse    Incontinence of feces    HOME SITUATION  Type of Home: House  Home Layout: Two level  Lives With: Spouse  Occupation/Status: working  Drives: Yes    SUBJECTIVE  Agreeable to activity  \"My knees bother me.\"  \"I feel close to back to normal , but my balance feels a little off\"    OCCUPATIONAL THERAPY EXAMINATION      OBJECTIVE       PAIN ASSESSMENT  Rating: Unable to rate  Location: bilateral knees      ACTIVITY TOLERANCE  Pulse: 70                      O2 SATURATIONS  Oxygen Therapy  SPO2% on Room Air at Rest:  100    COGNITION  Followed one-step cues  Alert and oriented x 4    Communication: mild word finding difficulty - baseline ? -CVA    Behavioral/Emotional/Social: overall flat affect, pleasant     RANGE OF MOTION   Upper extremity ROM is within functional limits     STRENGTH ASSESSMENT  Upper extremity strength is within functional limits     ACTIVITIES OF DAILY LIVING ASSESSMENT  AM-PAC ‘6-Clicks’ Inpatient Daily Activity Short Form  How much help from another person does the patient currently need…  -   Putting on and taking off regular lower body clothing?: A Little  -   Bathing (including washing, rinsing, drying)?: A Little  -   Toileting, which includes using toilet, bedpan or urinal? : A Little  -   Putting on and taking off regular upper body clothing?: A Little  -   Taking care of personal grooming such as brushing teeth?: A Little  -   Eating meals?: None    AM-PAC Score:  Score: 19  Approx Degree of Impairment: 42.8%  Standardized Score (AM-PAC Scale): 40.22  CMS Modifier (G-Code): CK    FUNCTIONAL TRANSFER ASSESSMENT  Sit to Stand: Edge of Bed  Edge of Bed: Contact Guard Assist    BED MOBILITY  Supine to Sit : Contact Guard Assist    FUNCTIONAL ADL ASSESSMENT  Grooming Standing: Contact Guard Assist  LB Dressing Seated: Contact Guard Assist    Skilled Therapy Provided: RN cleared pt for participation in occupational therapy session . Upon arrival, pt was supine in bed and agreeable to activity. No family was present during session. Pt benefited from additional time, verbal cues to maximize participation.  Pt completed ADLs and functional mobility with overall CGA, no overt loss of balance but mildly unsteady. Pt reported feeling close to baseline except for still feeling a little unsteady on his feet.       EDUCATION PROVIDED  Patient Education : Plan of Care; Role of Occupational Therapy; Functional Transfer Techniques (safety, activity promotion)  Patient's Response to Education: Verbalized  Understanding; Returned Demonstration     Patient End of Session: Up in chair;Needs met;Call light within reach;RN aware of session/findings    OT Goals  Patients self stated goal is: home     Patient will complete functional transfer with Mod I  Comment:     Patient will complete LE dressing with Mod I  Comment:     Patient will tolerate standing for 3 minutes in prep for adls with Mod I   Comment:    Patient will complete item retrieval with Mod I  Comment:          Goals  on: 24  Frequency: 3-5x/w    Patient Evaluation Complexity Level:   Occupational Profile/Medical History LOW - Brief history including review of medical or therapy records    Specific performance deficits impacting engagement in ADL/IADL LOW  1 - 3 performance deficits    Client Assessment/Performance Deficits LOW - No comorbidities nor modifications of tasks    Clinical Decision Making LOW - Analysis of occupational profile, problem-focused assessments, limited treatment options    Overall Complexity LOW     OT Session Time: 15 minutes  Self-Care Home Management: 15 minutes

## 2024-11-12 NOTE — PROGRESS NOTES
11/12/24 1242 11/12/24 1246 11/12/24 1247   Vital Signs   Pulse 63 66 69   Heart Rate Source Monitor Monitor Monitor   Resp 17  --   --    Respiratory Quality Normal  --   --    BP (!) 120/93 125/90 (!) 127/91   MAP (mmHg) 100 (!) 101 (!) 102   BP Location Right arm Right arm Right arm   BP Method Automatic Automatic Automatic   Patient Position Semi-[place]aram[place] Sitting Standing

## 2024-11-12 NOTE — PHYSICAL THERAPY NOTE
PHYSICAL THERAPY EVALUATION - INPATIENT     Room Number: 513/513-A  Evaluation Date: 11/12/2024  Type of Evaluation: Initial   Physician Order: PT Eval and Treat    Presenting Problem: leg swelling, urinary incontinence, syncope in ED, dizziness  Co-Morbidities : uncontrolled HTN, OA, chronic HFpEF, HLD, CAD s/p PCI of LAD, CVA, dysarthria  Reason for Therapy: Mobility Dysfunction and Discharge Planning    PHYSICAL THERAPY ASSESSMENT   Patient is a 59 year old male admitted 11/11/2024 for leg swelling, urinary incontinence, syncope in ED, dizziness. Prior to admission, patient's baseline is Independent with ADLs/iADLs/functional mobility, working, driving. Patient is currently functioning below baseline with bed mobility, transfers, gait, stair negotiation, standing prolonged periods, and performing household tasks.  Patient is requiring stand-by assist and contact guard assist as a result of the following impairments: decreased functional strength, decreased endurance/aerobic capacity, pain, impaired standing balance, decreased muscular endurance, and medical status.  Physical Therapy will continue to follow for duration of hospitalization.    Patient will benefit from continued skilled PT Services at discharge to promote prior level of function and safety with additional support and return home with OP PT.    PLAN DURING HOSPITALIZATION  Nursing Mobility Recommendation : 1 Assist  PT Device Recommendation: None  PT Treatment Plan: Bed mobility;Body mechanics;Endurance;Patient education;Energy conservation;Gait training;Strengthening;Stair training;Transfer training;Balance training  Rehab Potential : Good  Frequency (Obs): 5x/week     PHYSICAL THERAPY MEDICAL/SOCIAL HISTORY     Problem List  Principal Problem:    Leg swelling  Active Problems:    Syncope and collapse    Incontinence of feces    HOME SITUATION  Type of Home: House  Home Layout: Two level  Stairs to Enter : 6   Railing: Yes    Stairs to Bedroom:   (full flight)    Railing: Yes    Lives With: Spouse    Drives: Yes   Patient Regularly Uses: Glasses     SUBJECTIVE  Agreeable     PHYSICAL THERAPY EXAMINATION   OBJECTIVE  Precautions: Bed/chair alarm  Fall Risk: Standard fall risk    PAIN ASSESSMENT  Rating: Unable to rate  Location: bilateral knees and buttocks  Management Techniques: Activity promotion;Body mechanics;Repositioning    COGNITION  Following Commands:  follows one step commands with increased time  Mild word finding difficulty - history of CVA    RANGE OF MOTION AND STRENGTH ASSESSMENT  Upper extremity ROM and strength are within functional limits   Lower extremity ROM is within functional limits   Lower extremity strength is within functional limits     BALANCE  Static Sitting: Normal  Dynamic Sitting: Good  Static Standing: Fair +  Dynamic Standing: Fair    O2 WALK  Oxygen Therapy  SPO2% on Room Air at Rest: 100    AM-PAC '6-Clicks' INPATIENT SHORT FORM - BASIC MOBILITY  How much difficulty does the patient currently have...  Patient Difficulty: Turning over in bed (including adjusting bedclothes, sheets and blankets)?: A Little   Patient Difficulty: Sitting down on and standing up from a chair with arms (e.g., wheelchair, bedside commode, etc.): A Little   Patient Difficulty: Moving from lying on back to sitting on the side of the bed?: A Little   How much help from another person does the patient currently need...   Help from Another: Moving to and from a bed to a chair (including a wheelchair)?: A Little   Help from Another: Need to walk in hospital room?: A Little   Help from Another: Climbing 3-5 steps with a railing?: A Little     AM-PAC Score:  Raw Score: 18   Approx Degree of Impairment: 46.58%   Standardized Score (AM-PAC Scale): 43.63   CMS Modifier (G-Code): CK    FUNCTIONAL ABILITY STATUS  Functional Mobility/Gait Assessment  Gait Assistance:  (CGA>SBA)  Distance (ft): 100 ft x 2  Assistive Device: None  Pattern: Shuffle (decreased  skyla speed, decreased step length, increased lateral sway left/right, intermittently holding onto hallway railing for support)  Stairs: Stairs  How Many Stairs: 4  Device: 1 Rail  Assist: Contact guard assist  Pattern: Ascend and Descend  Ascend and Descend : Reciprocal (to ascend; step to pattern to descend)  Supine to Sit: contact guard assist  Sit to Supine: contact guard assist  Sit to Stand: contact guard assist    Exercise/Education Provided:  Bed mobility  Body mechanics  Energy conservation  Functional activity tolerated  Gait training  Posture  Transfer training  Stair negotiation    Skilled Therapy Provided: Patient in bed upon arrival. RN approved activity. Educated patient on POC and benefits of mobilization. Agreeable to participate. Patient reporting bilateral knee and buttocks pain, not quantified per the pain scale. Patient benefits from increased time in order to complete functional mobility tasks. Patient reporting that his balance still feels off - discussed participation in OP PT upon discharge, patient agreeable.     The patient's Approx Degree of Impairment: 46.58% has been calculated based on documentation in the [facility] '6 clicks' Inpatient Basic Mobility Short Form.  Research supports that patients with this level of impairment may benefit from OPPT.  Final disposition will be made by interdisciplinary medical team.    Patient End of Session: In bed;Needs met;Call light within reach;RN aware of session/findings;All patient questions and concerns addressed;Hospital anti-slip socks;Alarm set    CURRENT GOALS  Goals to be met by: 11/19/24  Patient Goal Patient's self-stated goal is: go home   Goal #1 Patient is able to demonstrate supine - sit EOB @ level: modified independent     Goal #1   Current Status    Goal #2 Patient is able to demonstrate transfers Sit to/from Stand at assistance level: modified independent with none     Goal #2  Current Status    Goal #3 Patient is able to ambulate  300 feet with assist device: none at assistance level: modified independent   Goal #3   Current Status    Goal #4 Patient will negotiate 12 stairs/one curb w/ assistive device and supervision   Goal #4   Current Status    Goal #5 Patient to demonstrate independence with home activity/exercise instructions provided to patient in preparation for discharge.   Goal #5   Current Status      Patient Evaluation Complexity Level:  History Moderate - 1 or 2 personal factors and/or co-morbidities   Examination of body systems Moderate - addressing a total of 3 or more elements   Clinical Presentation Low- Stable   Clinical Decision Making  Low Complexity     Gait Training: 15 minutes

## 2024-11-12 NOTE — PLAN OF CARE
Problem: Patient Centered Care  Goal: Patient preferences are identified and integrated in the patient's plan of care  Description: Interventions:  - What would you like us to know as we care for you?   - Provide timely, complete, and accurate information to patient/family  - Incorporate patient and family knowledge, values, beliefs, and cultural backgrounds into the planning and delivery of care  - Encourage patient/family to participate in care and decision-making at the level they choose  - Honor patient and family perspectives and choices  Outcome: Progressing     Problem: Patient/Family Goals  Goal: Patient/Family Long Term Goal  Description: Patient's Long Term Goal: To be discharged home.     Interventions:  - Neuro Checks  -Cardio consult  -Neuro Consult    - See additional Care Plan goals for specific interventions  Outcome: Progressing  Goal: Patient/Family Short Term Goal  Description: Patient's Short Term Goal: To bring down the swelling in my legs.     Interventions:   - Postioning  -Diuretics  -Doppler   - See additional Care Plan goals for specific interventions  Outcome: Progressing     Problem: SAFETY ADULT - FALL  Goal: Free from fall injury  Description: INTERVENTIONS:  - Assess pt frequently for physical needs  - Identify cognitive and physical deficits and behaviors that affect risk of falls.  - [place] fall precautions as indicated by assessment.  - Educate pt/family on patient safety including physical limitations  - Instruct pt to call for assistance with activity based on assessment  - Modify environment to reduce risk of injury  - Provide assistive devices as appropriate  - Consider OT/PT consult to assist with strengthening/mobility  - Encourage toileting schedule  Outcome: Progressing     Problem: CARDIOVASCULAR - ADULT  Goal: Maintains optimal cardiac output and hemodynamic stability  Description: INTERVENTIONS:  - Monitor vital signs, rhythm, and trends  - Monitor for bleeding,  hypotension and signs of decreased cardiac output  - Evaluate effectiveness of vasoactive medications to optimize hemodynamic stability  - Monitor arterial and/or venous puncture sites for bleeding and/or hematoma  - Assess quality of pulses, skin color and temperature  - Assess for signs of decreased coronary artery perfusion - ex. Angina  - Evaluate fluid balance, assess for edema, trend weights  Outcome: Progressing   Pt is A&Ox4 and forgetful at times. Pt had low output in his urinal and an a bladder scan was performed. Pt was not retaining urine. Pt was incontinent while on the unit and was given an adult brief. Pt was able to ambulate from the bed to the chair with standby assist. Pt denies any complaints of dizziness. NIH screening was performed by a NIH certified RN within the unit.

## 2024-11-12 NOTE — PROCEDURES
Inpatient Video EEG report    REFERRING PHYSICIAN: Oswaldo Qureshi MD      DURATION: 22min  TECHNIQUE: 21 channels of EEG, 2 channels of EOG, and 1 channel of EKG were recorded utilizing the 10-20 International System of Electrode Placement. The recording was performed in a digitized monopolar referential format and playback was reformatted into various referential and bipolar montages utilizing appropriate filter settings. Automatic seizure and spike detection programs were utilized throughout the recording. Video was recorded during the study  CLINICAL DATA:  Patient is sent for the evaluation of possible seizures.    MEDICATION:  Continuous Medications:    Scheduled Medications:  No current outpatient medications on file.  PRN Medications:    acetaminophen    melatonin    polyethylene glycol (PEG 3350)    sennosides    bisacodyl    ondansetron    hydrALAZINE    BACKGROUND:  The posterior dominant rhythm consisted of well-organized 8-9 Hz rhythmic waveforms, symmetrically distributed over both posterior quadrants and was reactive to eye opening.    ACTIVATION:  Sleep: Stage II sleep was not achieved.  Drowsiness showed generalized attenuation and slower frequencies.    EEG ABNORMALITY:  Slowing: None  Epileptiform activity: None  Seizures: None  Abnormal movements: None    IMPRESSION:  This is a normal wake and drowsy EEG.   No epileptiform activity, focal slowing, nor seizures were seen throughout the study    Miriam Johnson M.D.  Neurology

## 2024-11-12 NOTE — CONSULTS
[facility]  [address] [place] [address], [address]  [facility] [address]  [phone]          NEUROLOGY CONSULTATION NOTE    [facility]  part of [facility]    Report of Consultation  Martha Garcia Patient Status:  Observation     3/8/1965 MRN [id number]    Location [facility]5W Attending Oswaldo Qureshi MD    Hosp Day # 0 PCP Guerrero Borrero MD      Date of Admission:  2024  Date of Consult:  2024  Reason for Admission/Consultation:  etiology of LOC and fecal incontinence     Requested by: Oswaldo Qureshi MD  _________________________________________________________________________________________  Chief Complaint:   Chief Complaint   Patient presents with    Swelling Edema     HPI:  Martha Garcia is a 59 year old right handed male w/ a pmhx sig. for   HTN HLD CAD prior stroke, significant cerebrovascular disease/small vessel disease who is being evaluated for 2 episodes of fecal incontinence and evaluation of sudden loss of consciousness/syncope.  HPI primarily per the patient as well as his hospitalist Dr. Qureshi.  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  He was awake.    He reports in 2 days ago while he was getting ready to leave the hospital to go for haircut he stood up and lost consciousness.  Denies any lateral tongue biting.  While he was seen in our emergency department the hospitalist noted that he had fecal incontinence.  Patient had stool in his pants.  He did have a rectal exam which revealed normal tone.  There is concern for neurological etiology.  The patient's had an MRI of the brain and lumbar spine which provided no answer.  He also had a routine EEG completed to evaluate his sudden loss of consciousness.  It was also unrevealing.  There was no focal slowing.    He provides very limited history.  He reports that he is only had the 2 episodes of  incontinence.  He denies any other symptoms concerning for seizures.  He denies any new focal neurological deficits.  He agrees that he could have improved adherence with his blood pressure medication.    Patient had a bladder scan with no significant post residual volume.    Review and summation of prior records      ROS:  Pertinent positive and negatives per HPI.  All others were reviewed and negative.    Past Medical History:    Coronary atherosclerosis    CVA (cerebral vascular accident) (HCC)    Essential hypertension    High blood pressure    Hyperlipidemia       Past Surgical History:   Procedure Laterality Date    Cath drug eluting stent  02/13/2020    PTCA KIMBERLY LAD with kissing balloon PTCA LAD/Diag       History reviewed. No pertinent family history.    Social History     Socioeconomic History    Marital status:      Spouse name: Not on file    Number of children: Not on file    Years of education: Not on file    Highest education level: Not on file   Occupational History    Not on file   Tobacco Use    Smoking status: Never    Smokeless tobacco: Never   Vaping Use    Vaping status: Never Used   Substance and Sexual Activity    Alcohol use: Yes     Comment: on rare occasions    Drug use: Never    Sexual activity: Not on file   Other Topics Concern    Not on file   Social History Narrative    Not on file     Social Drivers of Health     Financial Resource Strain: Not on file   Food Insecurity: No Food Insecurity (11/11/2024)    Food Insecurity     Food Insecurity: Never true   Transportation Needs: No Transportation Needs (11/11/2024)    Transportation Needs     Lack of Transportation: No     Car Seat: Not on file   Physical Activity: Not on file   Stress: Not on file   Social Connections: Not on file   Housing Stability: Low Risk  (11/11/2024)    Housing Stability     Housing Instability: No     Housing Instability Emergency: Not on file     Crib or Bassinette: Not on file       Objective:    Last  vitals and weight :  Vitals:    11/12/24 1035   BP: (!) 136/94   Pulse: 68   Resp:    Temp:      @FLOWCHS(14)@    Exam:  - General: appears stated age and no distress  - CV: Symmetric pulses.   Carotids:   - Pulmonary: No sign of respiratory distress.  When this author walked in the room patient was snoring loudly.   Neurologic Exam  - Mental Status: Alert and attentive.  Able to provide a limited history.  He is oriented to person and place..  Speech is spontaneous, fluent, and prosodic. Comprehension intact. Repetition intact. Phrase length and rate are normal. No paraphasic errors, neologisms, anomia, acalculia, apraxia, anosognosia, or R/L confusion. No neglect.   - Cranial Nerves: No gaze preference. Visual fields:normal.  He has anisocoria.  His right pupil is larger than his left.  Right pupil is larger with dilation and constriction.  Right pupil is at least 5 mm.  EOMI. No nystagmus. No ptosis. V1-V3 intact B/L to light touch.No pathological facial asymmetry. No flattening of the nasolabial fold. .  Hearing grossly intact.  Tongue midline. No atrophy or fasiculations of the tongue noted. Palate and uvula elevate symmetrically.  Shoulder shrug symmetric. He has dysarthria at baseline.   - Fundoscopic exam:normal w/o hemorrhages, exudates, or papilledema.No attenuation. No pallor.  - Motor:  normal tone, normal bulk. No interosseous wasting. No flattening of hypothenar eminences.       Right Left     Motor Strength   Deltoids 5 5  Triceps 5 5  Biceps 5 5  Wrist Extensors 5 5   5 5      Knee extensors 5 4+  Knee flexors 5 5  Plantar flexion 5 5  Dorsiflexion 5 5      Pronator drift: No pronator drift   Arm Rolling: No orbiting.   Finger Taps: Finger taps are symmetric in rate and amplitude.    Rapid movements: Rapid/fine movements are symmetric. As expected their dominant hand is slightly faster.   Leg Drift: none   Foot Taps: Foot taps are symmetric.      Asterixis: No asterixis noted.   Tremor:       Reflexes:    C5 C6 C7  L4 S1   R 2+ 2+  2+ 2+   L 2+ 2+  2+ 2+   Adductor Spread: No adductor spread noted.    Frontal release signs:Not assessed.    Jaw Jerk:    Danitza's sign:absent   Nonsustained clonus: Absent   Sustained clonus: Absent   - Sensory:   Light touch: intact  Temperature: intact  Pinprick:   Vibration: intact  Proprioception:      Sensory level:      Romberg:   - Cerebellum: No truncal ataxia. No titubations. No dysmetria, no dysdiadochokinesis. No overshoot.      - Plantar response: flexor bilaterally    Inpatient Medications   cloNIDine  0.2 mg Oral BID    lisinopril  40 mg Oral Nightly    [START ON 11/13/2024] amLODIPine  5 mg Oral Daily    aspirin  81 mg Oral Daily    atorvastatin  80 mg Oral Nightly    metoprolol tartrate  100 mg Oral 2x Daily(Beta Blocker)    spironolactone  25 mg Oral Daily          acetaminophen    melatonin    polyethylene glycol (PEG 3350)    sennosides    bisacodyl    ondansetron    hydrALAZINE      Home Medications  Current Outpatient Medications   Medication Instructions    amLODIPine (NORVASC) 10 mg, Oral, Daily    aspirin 81 mg, Oral, Daily    atorvastatin (LIPITOR) 80 mg, Oral, Nightly    cloNIDine (CATAPRES) 0.1 mg, Oral, 3 times daily    ezetimibe (ZETIA) 10 mg, Daily    lisinopril (PRINIVIL; ZESTRIL) 20 mg, Oral, Nightly    metoprolol tartrate (LOPRESSOR) 100 mg, Oral, 2 times daily(Beta Blocker)    spironolactone (ALDACTONE) 25 mg, Oral, Daily        Data reviewed  Laboratory Data:  Lab Results   Component Value Date    WBC 6.0 11/12/2024    HGB 14.0 11/12/2024    HCT 41.3 11/12/2024    .0 (L) 11/12/2024    CREATSERUM 1.51 (H) 11/12/2024    BUN 15 11/12/2024     11/12/2024    K 3.7 11/12/2024     11/12/2024    CO2 25.0 11/12/2024     (H) 11/12/2024    CA 9.7 11/12/2024    ALB 4.2 11/12/2024    ALKPHO 76 11/12/2024    TP 7.5 11/12/2024    AST 16 11/12/2024    ALT 13 11/12/2024    PTT 25.1 07/07/2023    INR 1.02 07/07/2023    PTP 13.3  07/07/2023    DDIMER 0.68 (H) 02/12/2020    MG 2.2 11/12/2024    TROP 22.700 (HH) 02/14/2020     Recent Results (from the past 72 hours)   CBC With Differential With Platelet    Collection Time: 11/11/24  9:08 AM   Result Value Ref Range    WBC 5.9 4.0 - 11.0 x10(3) uL    RBC 5.06 4.30 - 5.70 x10(6)uL    HGB 13.7 13.0 - 17.5 g/dL    HCT 41.7 39.0 - 53.0 %    MCV 82.4 80.0 - 100.0 fL    MCH 27.1 26.0 - 34.0 pg    MCHC 32.9 31.0 - 37.0 g/dL    RDW-SD 42.9 35.1 - 46.3 fL    RDW 14.3 11.0 - 15.0 %    .0 (L) 150.0 - 450.0 10(3)uL    Immature Platelet Fraction 16.6 (H) 0.0 - 7.0 %    Neutrophil Absolute Prelim 4.08 1.50 - 7.70 x10 (3) uL    Neutrophil Absolute 4.08 1.50 - 7.70 x10(3) uL    Lymphocyte Absolute 0.91 (L) 1.00 - 4.00 x10(3) uL    Monocyte Absolute 0.76 0.10 - 1.00 x10(3) uL    Eosinophil Absolute 0.11 0.00 - 0.70 x10(3) uL    Basophil Absolute 0.04 0.00 - 0.20 x10(3) uL    Immature Granulocyte Absolute 0.02 0.00 - 1.00 x10(3) uL    Neutrophil % 68.9 %    Lymphocyte % 15.4 %    Monocyte % 12.8 %    Eosinophil % 1.9 %    Basophil % 0.7 %    Immature Granulocyte % 0.3 %   Basic Metabolic Panel (8)    Collection Time: 11/11/24  9:08 AM   Result Value Ref Range    Glucose 115 (H) 70 - 99 mg/dL    Sodium 143 136 - 145 mmol/L    Potassium 3.9 3.5 - 5.1 mmol/L    Chloride 109 98 - 112 mmol/L    CO2 27.0 21.0 - 32.0 mmol/L    Anion Gap 7 0 - 18 mmol/L    BUN 13 9 - 23 mg/dL    Creatinine 1.65 (H) 0.70 - 1.30 mg/dL    BUN/CREA Ratio 7.9 (L) 10.0 - 20.0    Calcium, Total 9.8 8.7 - 10.4 mg/dL    Calculated Osmolality 297 (H) 275 - 295 mOsm/kg    eGFR-Cr 47 (L) >=60 mL/min/1.73m2   BNP (Brain Natriuretic Peptide)    Collection Time: 11/11/24  9:08 AM   Result Value Ref Range    Beta Natriuretic Peptide 62 <100 pg/mL   Scan slide    Collection Time: 11/11/24  9:08 AM   Result Value Ref Range    Slide Review       Slide reviewed, normal WBC, RBC, and platelet morphology.   Troponin I (High Sensitivity)    Collection  Time: 11/11/24  9:08 AM   Result Value Ref Range    Troponin I (High Sensitivity) 49 <=53 ng/L   Urinalysis with Culture Reflex    Collection Time: 11/11/24 10:29 AM    Specimen: Urine, clean catch   Result Value Ref Range    Urine Color Colorless (A) Yellow    Clarity Urine Clear Clear    Spec Gravity 1.006 1.005 - 1.030    Glucose Urine Normal Normal mg/dL    Bilirubin Urine Negative Negative    Ketones Urine Negative Negative mg/dL    Blood Urine Negative Negative    pH Urine 6.5 5.0 - 8.0    Protein Urine Negative Negative mg/dL    Urobilinogen Urine Normal Normal    Nitrite Urine Negative Negative    Leukocyte Esterase Urine Negative Negative    Microscopic Microscopic not indicated    POCT Glucose    Collection Time: 11/11/24  1:12 PM   Result Value Ref Range    POC Glucose  120 (H) 70 - 99 mg/dL   EKG 12 Lead    Collection Time: 11/11/24  1:16 PM   Result Value Ref Range    Ventricular rate 71 BPM    Atrial rate 71 BPM    P-R Interval 142 ms    QRS Duration 96 ms    Q-T Interval 408 ms    QTC Calculation (Bezet) 443 ms    P Axis 25 degrees    R Axis 12 degrees    T Axis 25 degrees   Magnesium    Collection Time: 11/12/24  4:54 AM   Result Value Ref Range    Magnesium 2.2 1.6 - 2.6 mg/dL   Lipid Panel    Collection Time: 11/12/24  4:54 AM   Result Value Ref Range    Cholesterol, Total 227 (H) <200 mg/dL    HDL Cholesterol 32 (L) 40 - 59 mg/dL    Triglycerides 113 30 - 149 mg/dL    LDL Cholesterol 174 (H) <100 mg/dL    VLDL 23 0 - 30 mg/dL    Non HDL Chol 195 (H) <130 mg/dL   CBC With Differential With Platelet    Collection Time: 11/12/24  4:54 AM   Result Value Ref Range    WBC 6.0 4.0 - 11.0 x10(3) uL    RBC 5.06 4.30 - 5.70 x10(6)uL    HGB 14.0 13.0 - 17.5 g/dL    HCT 41.3 39.0 - 53.0 %    MCV 81.6 80.0 - 100.0 fL    MCH 27.7 26.0 - 34.0 pg    MCHC 33.9 31.0 - 37.0 g/dL    RDW-SD 41.7 35.1 - 46.3 fL    RDW 14.2 11.0 - 15.0 %    .0 (L) 150.0 - 450.0 10(3)uL    Neutrophil Absolute Prelim 3.86 1.50 -  7.70 x10 (3) uL    Neutrophil Absolute 3.86 1.50 - 7.70 x10(3) uL    Lymphocyte Absolute 1.19 1.00 - 4.00 x10(3) uL    Monocyte Absolute 0.81 0.10 - 1.00 x10(3) uL    Eosinophil Absolute 0.08 0.00 - 0.70 x10(3) uL    Basophil Absolute 0.03 0.00 - 0.20 x10(3) uL    Immature Granulocyte Absolute 0.03 0.00 - 1.00 x10(3) uL    Neutrophil % 64.4 %    Lymphocyte % 19.8 %    Monocyte % 13.5 %    Eosinophil % 1.3 %    Basophil % 0.5 %    Immature Granulocyte % 0.5 %   Comp Metabolic Panel (14)    Collection Time: 11/12/24  4:54 AM   Result Value Ref Range    Glucose 113 (H) 70 - 99 mg/dL    Sodium 145 136 - 145 mmol/L    Potassium 3.7 3.5 - 5.1 mmol/L    Chloride 110 98 - 112 mmol/L    CO2 25.0 21.0 - 32.0 mmol/L    Anion Gap 10 0 - 18 mmol/L    BUN 15 9 - 23 mg/dL    Creatinine 1.51 (H) 0.70 - 1.30 mg/dL    BUN/CREA Ratio 9.9 (L) 10.0 - 20.0    Calcium, Total 9.7 8.7 - 10.4 mg/dL    Calculated Osmolality 302 (H) 275 - 295 mOsm/kg    eGFR-Cr 52 (L) >=60 mL/min/1.73m2    ALT 13 10 - 49 U/L    AST 16 <34 U/L    Alkaline Phosphatase 76 45 - 117 U/L    Bilirubin, Total 1.0 0.2 - 1.1 mg/dL    Total Protein 7.5 5.7 - 8.2 g/dL    Albumin 4.2 3.2 - 4.8 g/dL    Globulin  3.3 2.0 - 3.5 g/dL    A/G Ratio 1.3 1.0 - 2.0     Lab Results   Component Value Date     11/12/2024    HDL 32 11/12/2024    TRIG 113 11/12/2024    VLDL 23 11/12/2024     HGBA1C:   Lab Results   Component Value Date    A1C 6.4 (H) 07/07/2023    A1C 6.0 (H) 02/13/2020    A1C 6.0 (H) 11/29/2019     (H) 07/07/2023        Test results/Imaging:   MRI SPINE LUMBAR (CPT=72148)    Result Date: 11/11/2024  CONCLUSION:  1. Mild -moderate multilevel disc and facet degeneration.  No significant central narrowing.  No finding to account for the patient's symptoms. 2. A 2.8 x 1.7 cm subcutaneous mass posterior to the T12 level.  Findings may represent an epidermoid/sebaceous cyst which is unchanged from 02/12/2020 CT. 3. Distended urinary bladder.    Dictated by  (CST): Wili Pennington MD on 11/11/2024 at 8:28 PM     Finalized by (CST): Wili Pennington MD on 11/11/2024 at 8:35 PM          MRI BRAIN (CPT=70551)    Result Date: 11/11/2024  CONCLUSION:   No evidence of acute infarction.  No evidence of acute intracranial abnormality.  Moderate chronic microvascular white matter ischemia.  Chronic thalamic and basal ganglia lacunar infarcts.  Chronic right cerebellar infarcts and small chronic left cerebellar infarct.  Chronic left pontine lacunar infarct.      Dictated by (CST): Abhi Perkins MD on 11/11/2024 at 6:32 PM     Finalized by (CST): Abhi Perkins MD on 11/11/2024 at 6:37 PM          US VENOUS DOPPLER LEG RIGHT - DIAG IMG (CPT=93971)    Result Date: 11/11/2024  CONCLUSION:  1. Normal right leg venous duplex exam. 2. No deep venous thrombosis.    Dictated by (CST): Inder Ch MD on 11/11/2024 at 9:46 AM     Finalized by (CST): Inder Ch MD on 11/11/2024 at 9:47 AM         EKG 12 Lead    Result Date: 11/11/2024  Sinus rhythm with Premature atrial complexes Minimal voltage criteria for LVH, may be normal variant ( Sokolow-Cordoba ) T wave abnormality, consider lateral ischemia Abnormal ECG When compared with ECG of 12-AUG-2024 08:18, Premature atrial complexes are now Present Confirmed by JESUS BARRAGAN, BRIAN ([id number]) on 11/11/2024 1:35:55 PM   MRI BRAIN (CPT=70551)    Result Date: 11/11/2024  CONCLUSION:   No evidence of acute infarction.  No evidence of acute intracranial abnormality.  Moderate chronic microvascular white matter ischemia.  Chronic thalamic and basal ganglia lacunar infarcts.  Chronic right cerebellar infarcts and small chronic left cerebellar infarct.  Chronic left pontine lacunar infarct.      Dictated by (CST): Abhi Perkins MD on 11/11/2024 at 6:32 PM     Finalized by (CST): Abhi Perkins MD on 11/11/2024 at 6:37 PM           Performed an independent visualization of  mri brain and mri lumbar spine  Imaging revealed:   agree w read          Martha Garcia is a 59 year old right handed male w/ a pmhx sig. for   HTN HLD CAD prior stroke, significant cerebrovascular disease/small vessel disease who is being evaluated for 2 episodes of fecal incontinence and evaluation of sudden loss of consciousness/syncope.  Etiology of his symptoms is unclear.  May need a GI consult to evaluate for his incontinence.  Low suspicion this related to cervical myelopathy but will image his C and T-spine    With and without contrast.  Will also add his lumbar spine with and without contrast.  He is already completed a lumbar spine without contrast that per radiology did not provide any clear etiology.  The imaging was also reviewed by this author.  His routine EEG was normal.  Syncope and incontinence raise possibility for dysautonomia.  Will also investigate for other symptoms including neuropathy that could account for dysautonomia.  Lastly he does have significant cerebrovascular disease.  He needs improved adherence with his antihypertensives and needs to follow recommendations for secondary stroke prevention given the severity of cerebrovascular disease.  Fecal incontinence can rarely  be associated w syncope. Does not account for episode while he was awake.  However, per Dr. Qureshi patients with spinal AVMs can have waxing waning episodes of incontinence.    Fecal incontinence  Differential Diagnosis:  Spinal avms per Dr. Qureshi. Greatly appreciate his input.   Suspect nonneurological etiology  Cervical or thoracic myelopathy  Peripheral process leading to dysautonomiaLumbar myelopathy due to infiltrative or neoplastic processes only visible with  contrast; doubt  Seizure; highly doubt  Interval stroke excluded    Diagnostics:  Consider GI workup    EEG completed  MRI brain completed  MRI lumbar spine completed  MRI CTL without contrast ordered.  Orthostatic vitals       2. Snoring  Rec cpap    3. Hx of stroke/significant cerebrovascular disease  Cont  antithrombotics,  asa and high intensity statin, Cholesterol Meds: atorvastatin - 80 MG; atorvastatin Tabs - 80 MG; ezetimibe Tabs - 10 MG   .  Home BP monitoring. Pt instructed to check BP at home in the AM and PM, and bring values to future clinic visits. BP goal is for normotension, < 130/80.  HbA1c goal <7.0  LDL-C goal  <70 mg/dL.   Frequent exercise as per AHA/ASA recs (30 min of aerobic exercise, 5 times per week).  If snoring, consider referral for sleep study for possible CONSUELO.  Recommend DASH diet. A diet that is rich in fruits and vegetables and thereby high in potassium is beneficial. Recommend reduced intake of sodium and increased intake of potassium, increase consumption of fruits, vegetables, and low-fat dairy products and  decreased consumption of saturated fat.          Education/Instructions given to: patient   Barriers to Learning:None  Content: Refer to note above. Evaluation/Outcome: Verbalized understanding    This document is not intended to support charting by exception.  Sections left blank in a completed note should be presumed not to have been done.    Disclaimer:   This record was dictated using Dragon software. There may be errors due to voice recognition problems that were not realized and corrected during the completion of the note.            Thank you.  Lloyd Andrews D.O.   Vascular & General Neurology  11/12/2024  12:19 PM

## 2024-11-12 NOTE — CONSULTS
Cardiology Consultation  [facility]    Martha Garcia Patient Status:  Observation    3/8/1965 MRN [id number]   Location [facility]5W Attending Oswaldo Qureshi MD   Hosp Day # 0 PCP Guerrero Borrero MD     Reason for Consultation:  Syncope    History of Present Illness:  Martha Garcia is a a(n) 59 year old male with pmh CAD, HTN, HL, CVA and noncompliance admitted with syncope.  Patient initially presenting to ER 2024 with multiple complaints including leg swelling, knee pain, urinary frequency.  Trop negative and BNP normal.  Patient given IV lasix and then planned for discharge, however had subsequent syncopal episode and then admitted.  Patient denies chest pain, palpitations, dyspnea, orthopnea, PND.      On admission patient afeb, HR 93, /115.  Trop negative, BNP wnl.  ECG with SR, PACs, lat TWI.  Venous doppler negative for DVT, MRI brain without acute findings.  Cardiology consulted for further eval and management.      History:  Past Medical History:    Coronary atherosclerosis    CVA (cerebral vascular accident) (HCC)    Essential hypertension    High blood pressure    Hyperlipidemia     Past Surgical History:   Procedure Laterality Date    Cath drug eluting stent  2020    PTCA KIMBERLY LAD with kissing balloon PTCA LAD/Diag     Family History:   There is no family history of premature coronary artery disease or sudden cardiac death.      reports that he has never smoked. He has never used smokeless tobacco. He reports current alcohol use. He reports that he does not use drugs.    Allergies:  Allergies[1]    Medications:  Medications Ordered Prior to Encounter[2]    Review of Systems:  Constitutional: denies fevers, chills, night sweats  HEENT: denies headache, vision changes, trouble or pain with swallowing  Cardiac: + edema  Pulm: denies dyspnea, cough, wheeze  GI: denies n/v, abd pain, diarrhea or constipation  : + urinary frequency  MSK: + knee pain  Neuro: denies numbness,  weakness, paresthesias  Psych: denies anxiety, depression  Integument: denies skin rashes or lesions  Heme: denies easy bruising or bleeding  Endo: denies heat/cold intolerance, skin or nail changes      Physical Exam:  Blood pressure (!) 160/93, pulse 72, temperature 98.2 °F (36.8 °C), temperature source Oral, resp. rate 18, height 6' 2\" (1.88 m), weight 266 lb (120.7 kg), SpO2 96%.  Wt Readings from Last 3 Encounters:   11/12/24 266 lb (120.7 kg)   08/12/24 285 lb (129.3 kg)   09/06/23 275 lb (124.7 kg)       General: awake, alert, oriented x 3, no acute distress  HEENT: at/nc, perrl, eomi  Neck: No JVD, carotids 2+ no bruits.  Cardiac: Regular rate and rhythm, S1, S2 normal, no murmur, rub or gallop.  Lungs: Clear without wheezes, rales, rhonchi or dullness.  Normal excursions and effort.  Abdomen: Soft, non-tender, non-distended, normal bowel sounds   Extremities: 1+ pitting edema bilat LEs  Neurologic: Alert and oriented, normal affect.  Psych: normal mood and affect  Skin: Warm and dry.       Laboratories and Data:  Diagnostics:  EKG: SR, PACs, lat TWI      Labs:   CBC:    Lab Results   Component Value Date    WBC 6.0 11/12/2024    WBC 5.9 11/11/2024    WBC 4.3 08/12/2024     Lab Results   Component Value Date    HGB 14.0 11/12/2024    HGB 13.7 11/11/2024    HGB 13.8 08/12/2024      Lab Results   Component Value Date    .0 (L) 11/12/2024    .0 (L) 11/11/2024    .0 08/12/2024     BMP:   No results found for: \"GLUCOSE\"  Lab Results   Component Value Date    K 3.7 11/12/2024    K 3.9 11/11/2024    K 3.5 08/12/2024     Lab Results   Component Value Date    BUN 15 11/12/2024    BUN 13 11/11/2024    BUN 14 08/12/2024     Lab Results   Component Value Date    CREATSERUM 1.51 (H) 11/12/2024    CREATSERUM 1.65 (H) 11/11/2024    CREATSERUM 1.66 (H) 08/12/2024     Cholesterol:     Lab Results   Component Value Date    CHOLEST 227 (H) 11/12/2024    CHOLEST 231 (H) 07/07/2023    CHOLEST 156.00  06/20/2020     Lab Results   Component Value Date    HDL 32 (L) 11/12/2024    HDL 43 07/07/2023    HDL 44 06/20/2020     Lab Results   Component Value Date    TRIG 113 11/12/2024    TRIG 124 07/07/2023    TRIG 48.00 06/20/2020     Lab Results   Component Value Date     (H) 11/12/2024     (H) 07/07/2023     06/20/2020     Lab Results   Component Value Date    AST 16 11/12/2024    AST 22 07/07/2023     (H) 02/13/2020     Lab Results   Component Value Date    ALT 13 11/12/2024    ALT 38 07/07/2023    ALT 37 02/13/2020       Assessment/Plan:  59 year old male presenting with:    1) LE edema/Knee pain  2) Syncope following IV lasix in ER  3) CAD s/p PCI LAD 2020  4) HTN  5) HL  6) CVA  7) CKD  8) Noncompliance with medications and follow up    - ECG on admission with SR, PACs, lat TWI; trop negative, BNP wnl  - Suspect syncopal episode possibly orthostatic in the setting of IV lasix administration; MRI brain without acute findings  - TTE ordered and pending  - Reduce amlodipine to 5mg daily (may be contributing to edema)  - Increase lisinopril to 40mg at bedtime  - Clonidine to 0.2mg bid to hopefully improve compliance (currently prescribed TID?)  - Cont metoprolol, spironolactone  - Cont, asa, statin  - Monitor on tele  - Will follow      Srini Aaron MD  Interventional cardiologist, [facility]  11/12/2024  10:34 AM         [1] No Known Allergies  [2]   No current facility-administered medications on file prior to encounter.     Current Outpatient Medications on File Prior to Encounter   Medication Sig Dispense Refill    ezetimibe 10 MG Oral Tab Take 1 tablet (10 mg total) by mouth daily.      amLODIPine 10 MG Oral Tab Take 1 tablet (10 mg total) by mouth daily. 90 tablet 2    aspirin 81 MG Oral Tab EC Take 1 tablet (81 mg total) by mouth daily. 30 tablet 0    cloNIDine 0.1 MG Oral Tab Take 1 tablet (0.1 mg total) by mouth 3 (three) times daily. 90 tablet 3    lisinopril 20 MG Oral  Tab Take 1 tablet (20 mg total) by mouth at bedtime. 30 tablet 0    metoprolol tartrate 100 MG Oral Tab Take 1 tablet (100 mg total) by mouth 2x Daily(Beta Blocker). 180 tablet 2    spironolactone 25 MG Oral Tab Take 1 tablet (25 mg total) by mouth daily. 30 tablet 0    atorvastatin 80 MG Oral Tab Take 1 tablet (80 mg total) by mouth nightly. 30 tablet 0

## 2024-11-12 NOTE — PLAN OF CARE
Problem: Patient Centered Care  Goal: Patient preferences are identified and integrated in the patient's plan of care  Description: Interventions:  - What would you like us to know as we care for you? From home with wife  - Provide timely, complete, and accurate information to patient/family  - Incorporate patient and family knowledge, values, beliefs, and cultural backgrounds into the planning and delivery of care  - Encourage patient/family to participate in care and decision-making at the level they choose  - Honor patient and family perspectives and choices  Outcome: Progressing     Problem: Patient/Family Goals  Goal: Patient/Family Long Term Goal  Description: Patient's Long Term Goal: discharge from hospital    Interventions:  - monitor vital signs   - monitor appropriate labs  - pain management   - administer medications per order  - follow MD orders  - diagnostics per order  - update and inform patient and family on plan of care  - discharge planning  - See additional Care Plan goals for specific interventions  Outcome: Progressing  Goal: Patient/Family Short Term Goal  Description: Patient's Short Term Goal: improve incontinence     Interventions:   - monitor vital signs   - monitor appropriate labs  - pain management   - administer medications per order  - follow MD orders  - diagnostics per order  - update and inform patient and family on plan of care  - See additional Care Plan goals for specific interventions  Outcome: Progressing     Problem: SAFETY ADULT - FALL  Goal: Free from fall injury  Description: INTERVENTIONS:  - Assess pt frequently for physical needs  - Identify cognitive and physical deficits and behaviors that affect risk of falls.  - [place] fall precautions as indicated by assessment.  - Educate pt/family on patient safety including physical limitations  - Instruct pt to call for assistance with activity based on assessment  - Modify environment to reduce risk of injury  - Provide  assistive devices as appropriate  - Consider OT/PT consult to assist with strengthening/mobility  - Encourage toileting schedule  Outcome: Progressing     Problem: CARDIOVASCULAR - ADULT  Goal: Maintains optimal cardiac output and hemodynamic stability  Description: INTERVENTIONS:  - Monitor vital signs, rhythm, and trends  - Monitor for bleeding, hypotension and signs of decreased cardiac output  - Evaluate effectiveness of vasoactive medications to optimize hemodynamic stability  - Monitor arterial and/or venous puncture sites for bleeding and/or hematoma  - Assess quality of pulses, skin color and temperature  - Assess for signs of decreased coronary artery perfusion - ex. Angina  - Evaluate fluid balance, assess for edema, trend weights  Outcome: Progressing     Problem: SKIN/TISSUE INTEGRITY - ADULT  Goal: Skin integrity remains intact  Description: INTERVENTIONS  - Assess and document risk factors for pressure ulcer development  - Assess and document skin integrity  - Monitor for areas of redness and/or skin breakdown  - Initiate interventions, skin care algorithm/standards of care as needed  Outcome: Progressing     Problem: NEUROLOGICAL - ADULT  Goal: Achieves maximal functionality and self care  Description: INTERVENTIONS  - Monitor swallowing and airway patency with patient fatigue and changes in neurological status  - Encourage and assist patient to increase activity and self care with guidance from PT/OT  - Encourage visually impaired, hearing impaired and aphasic patients to use assistive/communication devices  Outcome: Progressing     Problem: PAIN - ADULT  Goal: Verbalizes/displays adequate comfort level or patient's stated pain goal  Description: INTERVENTIONS:  - Encourage pt to monitor pain and request assistance  - Assess pain using appropriate pain scale  - Administer analgesics based on type and severity of pain and evaluate response  - Implement non-pharmacological measures as appropriate and  evaluate response  - Consider cultural and social influences on pain and pain management  - Manage/alleviate anxiety  - Utilize distraction and/or relaxation techniques  - Monitor for opioid side effects  - Notify MD/LIP if interventions unsuccessful or patient reports new pain  - Anticipate increased pain with activity and pre-medicate as appropriate  Outcome: Progressing     Problem: DISCHARGE PLANNING  Goal: Discharge to home or other facility with appropriate resources  Description: INTERVENTIONS:  - Identify barriers to discharge w/pt and caregiver  - Include patient/family/discharge partner in discharge planning  - Arrange for needed discharge resources and transportation as appropriate  - Identify discharge learning needs (meds, wound care, etc)  - Arrange for interpreters to assist at discharge as needed  - Consider post-discharge preferences of patient/family/discharge partner  - Complete POLST form as appropriate  - Assess patient's ability to be responsible for managing their own health  - Refer to Case Management Department for coordinating discharge planning if the patient needs post-hospital services based on physician/LIP order or complex needs related to functional status, cognitive ability or social support system  Outcome: Progressing    MRI of spine planned for this evening. Patient has no complaints of dizziness and orthostatics are negative.

## 2024-11-12 NOTE — PROGRESS NOTES
11/12/24 0920 11/12/24 0922 11/12/24 0925   Vital Signs   Pulse 71 71 72   Heart Rate Source Monitor Monitor Monitor   Resp 18 18 18   Respiratory Quality Normal Normal Normal   BP (!) 138/110 139/89 (!) 160/93   MAP (mmHg) (!) 113 (!) 104 (!) 114   BP Location Right arm Right arm Right arm   BP Method Automatic Automatic Automatic   Patient Position Lying Sitting Standing

## 2024-11-12 NOTE — PROGRESS NOTES
[facility] Hospitalist Progress Note     CC: Hospital Follow up    PCP: Guerrero Borrero MD       Assessment/Plan:     Principal Problem:    Leg swelling  Active Problems:    Syncope and collapse    Mr. Garcia is a 59 year old male with PMH sig for Uncontrolled HTN, OA, chronic HFpEF, HLD, CAD s/p PCI of LAD, hs of stroke, who presents with bilateral leg swelling, urinary incontinence.      Urinary and bowel incontinence  Dizziness / slurred speech   - notes 3-4 days of intermittent urinary incontinence PTA  - had episode of bowel incontinence in ER, stool also noted on underwear during rectal exam  - denies saddle anesthesia   - rectal exam with normal rectal tone  - no lower ext weakness, no back pain   - admits to intermittent slurred speech for the last year (since his stroke in 2023) not sig worse today  - this afternoon notes feeling lightheaded and dizzy   - UA negative, no signs of infection  - Neurology consulted, discussed with neuro  - Plan for Stat MRI, Lumbar spine MRI-> Lumbar spine without spinal cord impingement, MRI brain without acute stroke but multiple lacunar strokes  - EEG ordered, to rule out seizure activity -> no active seizure activity      Syncope  - trop negative  - no focal weakness  - tele continued  - check ECHO - > EF 50-55%, mild/mod aortic regurg,   - MRI as above-> without bleeding or stroke  - orthostatics negative     Bilateral leg swell  - venous doppler negative  - UA without proteinuria  - BNP normal  - venous insuff? Compression stocking  - gentle diuresis      HTN urgency  - patient is admittedly non-compliant with his BP medications  - not consistent with any BP medications  - med rec reviewed will resume prior home med list     Chronic HFpEF  - BNP normal  -  ECHO -> EF 50-55%, mild/mod aortic regurg,   - BP control     CAD  - hs of stent in 2020  - resume aspirin, statin  - check lipids -> uncontrolled , , not compliant   - BB resumed as well  -  cardiology evaluated      CKD  - cre 1.65  - has 1.3-1.6  - likely due to uncontrolled HTN     TCP  - plts 141  - normal prior  - repeat stable      HLD  - - check lipids -> uncontrolled , , not compliant   - resume statin     Hs of stroke  - MRI as above  - asa, statin      Non compliance   - discussed at length with patient and wife, importance of close medication adherence and follow up given hs of CAD, CHF, Strokes, HTN  - stated his understanding.     FN:  - IVF: none  - Diet: adv     DVT Prophy:scd, heparin   Lines: PIV     Dispo: pending clinical course     Discussed with wife at bedside on admission, will call on phone    Questions/concerns were discussed with patient and/or family by bedside.    Thank You,  Oswaldo Qureshi MD    Hospitalist with [facility]     Subjective:     No CP, SOB, or N/V.  Denies bowel or bladder incontinence this morning, states he felt the urge to go, no saddle anesthsia    OBJECTIVE:    Blood pressure (!) 136/94, pulse 68, temperature 98.2 °F (36.8 °C), temperature source Oral, resp. rate 18, height 6' 2\" (1.88 m), weight 266 lb (120.7 kg), SpO2 96%.    Temp:  [97.9 °F (36.6 °C)-99.3 °F (37.4 °C)] 98.2 °F (36.8 °C)  Pulse:  [] 68  Resp:  [12-26] 18  BP: (136-171)/() 136/94  SpO2:  [94 %-98 %] 96 %      Intake/Output:    Intake/Output Summary (Last 24 hours) at 11/12/2024 1222  Last data filed at 11/12/2024 0945  Gross per 24 hour   Intake 236 ml   Output 50 ml   Net 186 ml       Last 3 Weights   11/12/24 0925 266 lb (120.7 kg)   11/11/24 0816 285 lb (129.3 kg)   08/12/24 0738 285 lb (129.3 kg)   09/06/23 1313 275 lb (124.7 kg)       Exam    Gen: No acute distress, alert and oriented x3   Heent: NC AT, neck supple  Pulm: Lungs clear, normal respiratory effort  CV: Heart with regular rate and rhythm, + edema of the right lower ext   Abd: Abdomen soft, nontender, nondistended, bowel sounds present  MSK: no clubbing, no cyanosis  Skin: no rashes or  lesions  Neuro: AO*3, motor intact, no sensory deficits  Psyc: appropriate mood and affect      Data Review:       Labs:     Recent Labs   Lab 11/11/24 0908 11/12/24 0454   RBC 5.06 5.06   HGB 13.7 14.0   HCT 41.7 41.3   MCV 82.4 81.6   MCH 27.1 27.7   MCHC 32.9 33.9   RDW 14.3 14.2   NEPRELIM 4.08 3.86   WBC 5.9 6.0   .0* 146.0*         Recent Labs   Lab 11/11/24  0908 11/12/24 0454   * 113*   BUN 13 15   CREATSERUM 1.65* 1.51*   EGFRCR 47* 52*   CA 9.8 9.7    145   K 3.9 3.7    110   CO2 27.0 25.0       Recent Labs   Lab 11/12/24 0454   ALT 13   AST 16   ALB 4.2         Imaging:  MRI SPINE LUMBAR (CPT=72148)    Result Date: 11/11/2024  CONCLUSION:  1. Mild -moderate multilevel disc and facet degeneration.  No significant central narrowing.  No finding to account for the patient's symptoms. 2. A 2.8 x 1.7 cm subcutaneous mass posterior to the T12 level.  Findings may represent an epidermoid/sebaceous cyst which is unchanged from 02/12/2020 CT. 3. Distended urinary bladder.    Dictated by (CST): Wili Pennington MD on 11/11/2024 at 8:28 PM     Finalized by (CST): Wili Pennington MD on 11/11/2024 at 8:35 PM          MRI BRAIN (CPT=70551)    Result Date: 11/11/2024  CONCLUSION:   No evidence of acute infarction.  No evidence of acute intracranial abnormality.  Moderate chronic microvascular white matter ischemia.  Chronic thalamic and basal ganglia lacunar infarcts.  Chronic right cerebellar infarcts and small chronic left cerebellar infarct.  Chronic left pontine lacunar infarct.      Dictated by (CST): Abhi Perkins MD on 11/11/2024 at 6:32 PM     Finalized by (CST): Abhi Perkins MD on 11/11/2024 at 6:37 PM          US VENOUS DOPPLER LEG RIGHT - DIAG IMG (CPT=93971)    Result Date: 11/11/2024  CONCLUSION:  1. Normal right leg venous duplex exam. 2. No deep venous thrombosis.    Dictated by (CST): Inder Ch MD on 11/11/2024 at 9:46 AM     Finalized by (CST): Jing  Inder LION MD on 11/11/2024 at 9:47 AM             Meds:      cloNIDine  0.2 mg Oral BID    lisinopril  40 mg Oral Nightly    [START ON 11/13/2024] amLODIPine  5 mg Oral Daily    aspirin  81 mg Oral Daily    atorvastatin  80 mg Oral Nightly    metoprolol tartrate  100 mg Oral 2x Daily(Beta Blocker)    spironolactone  25 mg Oral Daily         acetaminophen    melatonin    polyethylene glycol (PEG 3350)    sennosides    bisacodyl    ondansetron    hydrALAZINE

## 2024-11-12 NOTE — PROGRESS NOTES
11/12/24 1706 11/12/24 1709 11/12/24 1712   Vital Signs   Heart Rate Source Monitor Monitor Monitor   Resp 18  --   --    Respiratory Quality Normal  --   --    BP (!) 144/99 (!) 137/99 (!) 138/105   MAP (mmHg) (!) 112 (!) 111 (!) 116   BP Location Right arm Right arm Right arm   BP Method Automatic Automatic Automatic   Patient Position Lying Sitting Standing

## 2024-11-13 ENCOUNTER — APPOINTMENT (OUTPATIENT)
Dept: CT IMAGING | Facility: HOSPITAL | Age: 59
End: 2024-11-13
Attending: HOSPITALIST
Payer: COMMERCIAL

## 2024-11-13 ENCOUNTER — APPOINTMENT (OUTPATIENT)
Dept: MRI IMAGING | Facility: HOSPITAL | Age: 59
End: 2024-11-13
Attending: Other
Payer: COMMERCIAL

## 2024-11-13 PROBLEM — R32 INCONTINENCE: Status: ACTIVE | Noted: 2024-11-13

## 2024-11-13 LAB
ANION GAP SERPL CALC-SCNC: 7 MMOL/L (ref 0–18)
BASOPHILS # BLD AUTO: 0.06 X10(3) UL (ref 0–0.2)
BASOPHILS NFR BLD AUTO: 1.1 %
BUN BLD-MCNC: 19 MG/DL (ref 9–23)
BUN/CREAT SERPL: 13 (ref 10–20)
CALCIUM BLD-MCNC: 9.4 MG/DL (ref 8.7–10.4)
CHLORIDE SERPL-SCNC: 109 MMOL/L (ref 98–112)
CO2 SERPL-SCNC: 26 MMOL/L (ref 21–32)
CREAT BLD-MCNC: 1.46 MG/DL
DEPRECATED RDW RBC AUTO: 41.7 FL (ref 35.1–46.3)
EGFRCR SERPLBLD CKD-EPI 2021: 54 ML/MIN/1.73M2 (ref 60–?)
EOSINOPHIL # BLD AUTO: 0.17 X10(3) UL (ref 0–0.7)
EOSINOPHIL NFR BLD AUTO: 3.2 %
ERYTHROCYTE [DISTWIDTH] IN BLOOD BY AUTOMATED COUNT: 14.3 % (ref 11–15)
GLUCOSE BLD-MCNC: 106 MG/DL (ref 70–99)
HCT VFR BLD AUTO: 40.5 %
HGB BLD-MCNC: 13.3 G/DL
IMM GRANULOCYTES # BLD AUTO: 0.04 X10(3) UL (ref 0–1)
IMM GRANULOCYTES NFR BLD: 0.7 %
LYMPHOCYTES # BLD AUTO: 1.24 X10(3) UL (ref 1–4)
LYMPHOCYTES NFR BLD AUTO: 23 %
MAGNESIUM SERPL-MCNC: 2.2 MG/DL (ref 1.6–2.6)
MCH RBC QN AUTO: 26.6 PG (ref 26–34)
MCHC RBC AUTO-ENTMCNC: 32.8 G/DL (ref 31–37)
MCV RBC AUTO: 81 FL
MONOCYTES # BLD AUTO: 0.64 X10(3) UL (ref 0.1–1)
MONOCYTES NFR BLD AUTO: 11.9 %
NEUTROPHILS # BLD AUTO: 3.23 X10 (3) UL (ref 1.5–7.7)
NEUTROPHILS # BLD AUTO: 3.23 X10(3) UL (ref 1.5–7.7)
NEUTROPHILS NFR BLD AUTO: 60.1 %
OSMOLALITY SERPL CALC.SUM OF ELEC: 297 MOSM/KG (ref 275–295)
PLATELET # BLD AUTO: 156 10(3)UL (ref 150–450)
POTASSIUM SERPL-SCNC: 4.2 MMOL/L (ref 3.5–5.1)
RBC # BLD AUTO: 5 X10(6)UL
SODIUM SERPL-SCNC: 142 MMOL/L (ref 136–145)
WBC # BLD AUTO: 5.4 X10(3) UL (ref 4–11)

## 2024-11-13 PROCEDURE — 72157 MRI CHEST SPINE W/O & W/DYE: CPT | Performed by: OTHER

## 2024-11-13 PROCEDURE — 72156 MRI NECK SPINE W/O & W/DYE: CPT | Performed by: OTHER

## 2024-11-13 PROCEDURE — 74176 CT ABD & PELVIS W/O CONTRAST: CPT | Performed by: HOSPITALIST

## 2024-11-13 PROCEDURE — 72158 MRI LUMBAR SPINE W/O & W/DYE: CPT | Performed by: OTHER

## 2024-11-13 RX ORDER — CLONIDINE HYDROCHLORIDE 0.1 MG/1
0.1 TABLET ORAL 2 TIMES DAILY
Qty: 60 TABLET | Refills: 0 | Status: SHIPPED | OUTPATIENT
Start: 2024-11-13 | End: 2024-12-13

## 2024-11-13 RX ORDER — AMLODIPINE BESYLATE 5 MG/1
5 TABLET ORAL DAILY
Qty: 30 TABLET | Refills: 0 | Status: SHIPPED | OUTPATIENT
Start: 2024-11-14

## 2024-11-13 RX ORDER — GADOTERATE MEGLUMINE 376.9 MG/ML
20 INJECTION INTRAVENOUS
Status: COMPLETED | OUTPATIENT
Start: 2024-11-13 | End: 2024-11-13

## 2024-11-13 NOTE — CM/SW NOTE
Anticipated therapy need: Home with Outpatient Rehab    CM met with patient at bedside to discuss anticipated therapy need above.  Patient is agreeable to OP PT.      CM explained that it may take a couple weeks to obtain appointment for OP PT, and inquired if patient would be agreeable to HH PT in the interim.  Patient is not agreeable to HH PT, and is not primarily home bound so likely would not qualify.    CM sent message via [employer] to Dr. Qureshi at 1456 requesting prescription for OP PT prior to patient discharge.    CM cancelled pending home healthcare referral in [place].    / to remain available for support and/or discharge planning.     Plan: Home with Outpatient PT, pending OP PT RX, medical clearance    Linsey Butler RN, BSN  Nurse   [phone]

## 2024-11-13 NOTE — PHYSICAL THERAPY NOTE
Physical Therapy Contact Note    Orders received and chart reviewed. Attempted to see patient for Physical Therapy services. Patient not available secondary to off the floor at MRI. Will re-schedule visit.    Emily Angeles, PT, DPT

## 2024-11-13 NOTE — SIGNIFICANT EVENT
.Risks/benefits/alternatives discussed with patient as applicable to reason for leaving AMA? Yes  Provider(s) contacted: Oswaldo Qureshi  Patient education/AVS/follow-up appointments/prescriptions given? yes  AMA paperwork completed? yes  Removed IV access/decannulated port if applicable and patient belongings returned.    Documented from Admission Navigator:  Belongings at Bedside: Vision  Vision - Corrective Lenses: Glasses  Belongings Sent to Public Safety: None  Medications brought by patient?: No

## 2024-11-13 NOTE — PROGRESS NOTES
Attempted to see patient, off the floor for MRI.  Chart reviewed, discussed with Dr. Qureshi.  Will attempt to see later today, otherwise will resume care tomorrow.

## 2024-11-13 NOTE — PLAN OF CARE
Pt was planning on leaving during the day, wife came to bedside convinced pt to stay. Pt to have CT. Frequent rounding by staff      Problem: Patient Centered Care  Goal: Patient preferences are identified and integrated in the patient's plan of care  Description: Interventions:  - What would you like us to know as we care for you? From home with wife  - Provide timely, complete, and accurate information to patient/family  - Incorporate patient and family knowledge, values, beliefs, and cultural backgrounds into the planning and delivery of care  - Encourage patient/family to participate in care and decision-making at the level they choose  - Honor patient and family perspectives and choices  Outcome: Progressing     Problem: Patient/Family Goals  Goal: Patient/Family Long Term Goal  Description: Patient's Long Term Goal: discharge from hospital    Interventions:  - monitor vital signs   - monitor appropriate labs  - pain management   - administer medications per order  - follow MD orders  - diagnostics per order  - update and inform patient and family on plan of care  - discharge planning  - See additional Care Plan goals for specific interventions  Outcome: Progressing  Goal: Patient/Family Short Term Goal  Description: Patient's Short Term Goal: improve incontinence     Interventions:   - monitor vital signs   - monitor appropriate labs  - pain management   - administer medications per order  - follow MD orders  - diagnostics per order  - update and inform patient and family on plan of care  - See additional Care Plan goals for specific interventions  Outcome: Progressing     Problem: SAFETY ADULT - FALL  Goal: Free from fall injury  Description: INTERVENTIONS:  - Assess pt frequently for physical needs  - Identify cognitive and physical deficits and behaviors that affect risk of falls.  - [place] fall precautions as indicated by assessment.  - Educate pt/family on patient safety including physical  limitations  - Instruct pt to call for assistance with activity based on assessment  - Modify environment to reduce risk of injury  - Provide assistive devices as appropriate  - Consider OT/PT consult to assist with strengthening/mobility  - Encourage toileting schedule  Outcome: Progressing     Problem: CARDIOVASCULAR - ADULT  Goal: Maintains optimal cardiac output and hemodynamic stability  Description: INTERVENTIONS:  - Monitor vital signs, rhythm, and trends  - Monitor for bleeding, hypotension and signs of decreased cardiac output  - Evaluate effectiveness of vasoactive medications to optimize hemodynamic stability  - Monitor arterial and/or venous puncture sites for bleeding and/or hematoma  - Assess quality of pulses, skin color and temperature  - Assess for signs of decreased coronary artery perfusion - ex. Angina  - Evaluate fluid balance, assess for edema, trend weights  Outcome: Progressing     Problem: SKIN/TISSUE INTEGRITY - ADULT  Goal: Skin integrity remains intact  Description: INTERVENTIONS  - Assess and document risk factors for pressure ulcer development  - Assess and document skin integrity  - Monitor for areas of redness and/or skin breakdown  - Initiate interventions, skin care algorithm/standards of care as needed  Outcome: Progressing     Problem: NEUROLOGICAL - ADULT  Goal: Achieves maximal functionality and self care  Description: INTERVENTIONS  - Monitor swallowing and airway patency with patient fatigue and changes in neurological status  - Encourage and assist patient to increase activity and self care with guidance from PT/OT  - Encourage visually impaired, hearing impaired and aphasic patients to use assistive/communication devices  Outcome: Progressing     Problem: PAIN - ADULT  Goal: Verbalizes/displays adequate comfort level or patient's stated pain goal  Description: INTERVENTIONS:  - Encourage pt to monitor pain and request assistance  - Assess pain using appropriate pain scale  -  Administer analgesics based on type and severity of pain and evaluate response  - Implement non-pharmacological measures as appropriate and evaluate response  - Consider cultural and social influences on pain and pain management  - Manage/alleviate anxiety  - Utilize distraction and/or relaxation techniques  - Monitor for opioid side effects  - Notify MD/LIP if interventions unsuccessful or patient reports new pain  - Anticipate increased pain with activity and pre-medicate as appropriate  Outcome: Progressing     Problem: DISCHARGE PLANNING  Goal: Discharge to home or other facility with appropriate resources  Description: INTERVENTIONS:  - Identify barriers to discharge w/pt and caregiver  - Include patient/family/discharge partner in discharge planning  - Arrange for needed discharge resources and transportation as appropriate  - Identify discharge learning needs (meds, wound care, etc)  - Arrange for interpreters to assist at discharge as needed  - Consider post-discharge preferences of patient/family/discharge partner  - Complete POLST form as appropriate  - Assess patient's ability to be responsible for managing their own health  - Refer to Case Management Department for coordinating discharge planning if the patient needs post-hospital services based on physician/LIP order or complex needs related to functional status, cognitive ability or social support system  Outcome: Progressing

## 2024-11-13 NOTE — PROGRESS NOTES
[facility] [place] Hospitalist Progress Note     CC: Hospital Follow up    PCP: Guerrero Borrero MD       Assessment/Plan:     Principal Problem:    Leg swelling  Active Problems:    Syncope and collapse    Incontinence of feces    Mr. Garcia is a 59 year old male with PMH sig for Uncontrolled HTN, OA, chronic HFpEF, HLD, CAD s/p PCI of LAD, hs of stroke, who presents with bilateral leg swelling, urinary incontinence,      Urinary and bowel incontinence  Dizziness / slurred speech   - notes 3-4 days of intermittent urinary incontinence PTA  - had episode of bowel incontinence in ER, stool also noted on underwear during rectal exam  - denies saddle anesthesia   - rectal exam with normal rectal tone  - no lower ext weakness, no back pain   - admits to intermittent slurred speech for the last year (since his stroke in 2023) not sig worse today  - this afternoon notes feeling lightheaded and dizzy   - UA negative, no signs of infection  - Neurology consulted, discussed with neuro  - Plan for Stat MRI, Lumbar spine MRI-> Lumbar spine without spinal cord impingement, MRI brain without acute stroke but multiple lacunar strokes  - EEG ordered, to rule out seizure activity -> no active seizure activity   - neuro evaluated and ordered C/T/L spine MRI with and without contrast   - ordering CT/ABD/Pelvis to further look for poss causes      Syncope  - trop negative  - no focal weakness  - tele continued  - check ECHO - > EF 50-55%, mild/mod aortic regurg,   - MRI as above-> without bleeding or stroke  - orthostatics negative     Bilateral leg swell  - venous doppler negative  - UA without proteinuria  - BNP normal  - venous insuff? Compression stocking  - gentle diuresis      HTN urgency  - patient is admittedly non-compliant with his BP medications  - not consistent with any BP medications  - med rec reviewed will resume prior home med list  - much improved      Chronic HFpEF  - BNP normal  -  ECHO -> EF 50-55%, mild/mod  aortic regurg,   - BP control     CAD  - hs of stent in 2020  - resume aspirin, statin  - check lipids -> uncontrolled , , not compliant   - BB resumed as well  - cardiology evaluated      CKD  - cre 1.65  - has 1.3-1.6  - likely due to uncontrolled HTN     TCP  - plts 141  - normal prior  - repeat stable      HLD  - - check lipids -> uncontrolled , , not compliant   - resume statin     Hs of stroke  - MRI as above  - asa, statin      Non compliance   - discussed at length with patient and wife, importance of close medication adherence and follow up given hs of CAD, CHF, Strokes, HTN  - stated his understanding      FN:  - IVF: none  - Diet: adv     DVT Prophy:scd, heparin   Lines: PIV     Dispo: pending clinical course    Discussed with wife at bedside on admission, will call on phone, no answer called multiple times    Discussed with patient regarding possible etiologies, recommended continued work up regarding and close monitoring, patient understands this but doesn't want to stay in the hospital, he states he wants to go home, that he now feels fine.  Discussed the risks of further decompensation and worsening of symptoms or even death, states his understanding of above.      Came to bedside at 3:05 pm again discussed importance of further work up and diagnosis, patient understands this and does not want further work up, wants to go home, understands this will be against medical advice, and understands risks, advised close follow up with PCP as outpatient, and to be compliant with medications as directed.     Called wife *5, no answer, patient did not want to give wife's work number.      Questions/concerns were discussed with patient and/or family by bedside.    Thank You,  Oswaldo Qureshi MD    Hospitalist with [facility]     Subjective:     No CP, SOB, or N/V.  Denies bowel or bladder incontinence this morning, states he felt the urge to go, no saddle  anesthsia    OBJECTIVE:    Blood pressure 119/81, pulse 64, temperature 97.4 °F (36.3 °C), temperature source Oral, resp. rate 18, height 6' 2\" (1.88 m), weight 266 lb 12.8 oz (121 kg), SpO2 97%.    Temp:  [97.4 °F (36.3 °C)-98.8 °F (37.1 °C)] 97.4 °F (36.3 °C)  Pulse:  [56-73] 64  Resp:  [17-19] 18  BP: (118-153)/() 119/81  SpO2:  [95 %-100 %] 97 %      Intake/Output:    Intake/Output Summary (Last 24 hours) at 11/13/2024 1232  Last data filed at 11/13/2024 0821  Gross per 24 hour   Intake 880 ml   Output --   Net 880 ml       Last 3 Weights   11/13/24 0605 266 lb 12.8 oz (121 kg)   11/12/24 0925 266 lb (120.7 kg)   11/11/24 0816 285 lb (129.3 kg)   08/12/24 0738 285 lb (129.3 kg)   09/06/23 1313 275 lb (124.7 kg)       Exam    Gen: No acute distress, alert and oriented x3   Heent: NC AT, neck supple  Pulm: Lungs clear, normal respiratory effort  CV: Heart with regular rate and rhythm, + edema of the right lower ext   Abd: Abdomen soft, nontender, nondistended, bowel sounds present  MSK: no clubbing, no cyanosis  Skin: no rashes or lesions  Neuro: AO*3, motor intact, no sensory deficits  Psyc: appropriate mood and affect      Data Review:       Labs:     Recent Labs   Lab 11/11/24  0908 11/12/24  0454 11/13/24  0605   RBC 5.06 5.06 5.00   HGB 13.7 14.0 13.3   HCT 41.7 41.3 40.5   MCV 82.4 81.6 81.0   MCH 27.1 27.7 26.6   MCHC 32.9 33.9 32.8   RDW 14.3 14.2 14.3   NEPRELIM 4.08 3.86 3.23   WBC 5.9 6.0 5.4   .0* 146.0* 156.0         Recent Labs   Lab 11/11/24  0908 11/12/24  0454 11/13/24  0605   * 113* 106*   BUN 13 15 19   CREATSERUM 1.65* 1.51* 1.46*   EGFRCR 47* 52* 54*   CA 9.8 9.7 9.4    145 142   K 3.9 3.7 4.2    110 109   CO2 27.0 25.0 26.0       Recent Labs   Lab 11/12/24 0454   ALT 13   AST 16   ALB 4.2         Imaging:  MRI SPINE CERVICAL (W+WO) (CPT=72156)    Result Date: 11/13/2024  CONCLUSION:   Degenerative disc, facet, uncovertebral joint disease throughout the  cervical spine, most prominent at C3 through C7 where there is moderate narrowing of the canal and bilateral neural foramen.  No gross cord edema or myelomalacia given limitations from motion artifact.     Dictated by (CST): Morgan Kim MD on 11/13/2024 at 11:55 AM     Finalized by (CST): Morgan Kim MD on 11/13/2024 at 11:58 AM          MRI SPINE LUMBAR (CPT=72148)    Result Date: 11/11/2024  CONCLUSION:  1. Mild -moderate multilevel disc and facet degeneration.  No significant central narrowing.  No finding to account for the patient's symptoms. 2. A 2.8 x 1.7 cm subcutaneous mass posterior to the T12 level.  Findings may represent an epidermoid/sebaceous cyst which is unchanged from 02/12/2020 CT. 3. Distended urinary bladder.    Dictated by (CST): Wili Pennington MD on 11/11/2024 at 8:28 PM     Finalized by (CST): Wili Pennington MD on 11/11/2024 at 8:35 PM          MRI BRAIN (CPT=70551)    Result Date: 11/11/2024  CONCLUSION:   No evidence of acute infarction.  No evidence of acute intracranial abnormality.  Moderate chronic microvascular white matter ischemia.  Chronic thalamic and basal ganglia lacunar infarcts.  Chronic right cerebellar infarcts and small chronic left cerebellar infarct.  Chronic left pontine lacunar infarct.      Dictated by (CST): Abhi Perkins MD on 11/11/2024 at 6:32 PM     Finalized by (CST): Abhi Perkins MD on 11/11/2024 at 6:37 PM          US VENOUS DOPPLER LEG RIGHT - DIAG IMG (CPT=93971)    Result Date: 11/11/2024  CONCLUSION:  1. Normal right leg venous duplex exam. 2. No deep venous thrombosis.    Dictated by (CST): Inder Ch MD on 11/11/2024 at 9:46 AM     Finalized by (CST): Inder Ch MD on 11/11/2024 at 9:47 AM             Meds:      cloNIDine  0.2 mg Oral BID    lisinopril  40 mg Oral Nightly    amLODIPine  5 mg Oral Daily    lidocaine-menthol  1 patch Transdermal Daily    morphINE PF  2 mg Intravenous Once    aspirin  81 mg Oral Daily     atorvastatin  80 mg Oral Nightly    metoprolol tartrate  100 mg Oral 2x Daily(Beta Blocker)    spironolactone  25 mg Oral Daily         traMADol    HYDROcodone-acetaminophen    morphINE PF    acetaminophen    melatonin    polyethylene glycol (PEG 3350)    sennosides    bisacodyl    ondansetron    hydrALAZINE

## 2024-11-13 NOTE — PLAN OF CARE
Received pt in stable condition. VSS, on RA. Tolerated all scheduled medications, no complications noted. Continuous telemetry monitoring in place. Neuro checks Q shift. Orthostatic hypotension checks Q 4 hrs. No pain voiced from pt at this time. Frequent rounding on pt. Call light placed within pt.'s reach. Bed in lowest position. Fall & safety precautions in place for pt. Will continue to monitor for any new, acute changes.   Problem: Patient Centered Care  Goal: Patient preferences are identified and integrated in the patient's plan of care  Description: Interventions:  - What would you like us to know as we care for you? From home with wife  - Provide timely, complete, and accurate information to patient/family  - Incorporate patient and family knowledge, values, beliefs, and cultural backgrounds into the planning and delivery of care  - Encourage patient/family to participate in care and decision-making at the level they choose  - Honor patient and family perspectives and choices  Outcome: Progressing     Problem: Patient/Family Goals  Goal: Patient/Family Long Term Goal  Description: Patient's Long Term Goal: discharge from hospital    Interventions:  - monitor vital signs   - monitor appropriate labs  - pain management   - administer medications per order  - follow MD orders  - diagnostics per order  - update and inform patient and family on plan of care  - discharge planning  - See additional Care Plan goals for specific interventions  Outcome: Progressing  Goal: Patient/Family Short Term Goal  Description: Patient's Short Term Goal: improve incontinence     Interventions:   - monitor vital signs   - monitor appropriate labs  - pain management   - administer medications per order  - follow MD orders  - diagnostics per order  - update and inform patient and family on plan of care  - See additional Care Plan goals for specific interventions  Outcome: Progressing     Problem: SAFETY ADULT - FALL  Goal: Free  from fall injury  Description: INTERVENTIONS:  - Assess pt frequently for physical needs  - Identify cognitive and physical deficits and behaviors that affect risk of falls.  - [place] fall precautions as indicated by assessment.  - Educate pt/family on patient safety including physical limitations  - Instruct pt to call for assistance with activity based on assessment  - Modify environment to reduce risk of injury  - Provide assistive devices as appropriate  - Consider OT/PT consult to assist with strengthening/mobility  - Encourage toileting schedule  Outcome: Progressing     Problem: PAIN - ADULT  Goal: Verbalizes/displays adequate comfort level or patient's stated pain goal  Description: INTERVENTIONS:  - Encourage pt to monitor pain and request assistance  - Assess pain using appropriate pain scale  - Administer analgesics based on type and severity of pain and evaluate response  - Implement non-pharmacological measures as appropriate and evaluate response  - Consider cultural and social influences on pain and pain management  - Manage/alleviate anxiety  - Utilize distraction and/or relaxation techniques  - Monitor for opioid side effects  - Notify MD/LIP if interventions unsuccessful or patient reports new pain  - Anticipate increased pain with activity and pre-medicate as appropriate  Outcome: Progressing     Problem: DISCHARGE PLANNING  Goal: Discharge to home or other facility with appropriate resources  Description: INTERVENTIONS:  - Identify barriers to discharge w/pt and caregiver  - Include patient/family/discharge partner in discharge planning  - Arrange for needed discharge resources and transportation as appropriate  - Identify discharge learning needs (meds, wound care, etc)  - Arrange for interpreters to assist at discharge as needed  - Consider post-discharge preferences of patient/family/discharge partner  - Complete POLST form as appropriate  - Assess patient's ability to be responsible for  managing their own health  - Refer to Case Management Department for coordinating discharge planning if the patient needs post-hospital services based on physician/LIP order or complex needs related to functional status, cognitive ability or social support system  Outcome: Progressing     Problem: CARDIOVASCULAR - ADULT  Goal: Maintains optimal cardiac output and hemodynamic stability  Description: INTERVENTIONS:  - Monitor vital signs, rhythm, and trends  - Monitor for bleeding, hypotension and signs of decreased cardiac output  - Evaluate effectiveness of vasoactive medications to optimize hemodynamic stability  - Monitor arterial and/or venous puncture sites for bleeding and/or hematoma  - Assess quality of pulses, skin color and temperature  - Assess for signs of decreased coronary artery perfusion - ex. Angina  - Evaluate fluid balance, assess for edema, trend weights  Outcome: Progressing     Problem: SKIN/TISSUE INTEGRITY - ADULT  Goal: Skin integrity remains intact  Description: INTERVENTIONS  - Assess and document risk factors for pressure ulcer development  - Assess and document skin integrity  - Monitor for areas of redness and/or skin breakdown  - Initiate interventions, skin care algorithm/standards of care as needed  Outcome: Progressing     Problem: NEUROLOGICAL - ADULT  Goal: Achieves maximal functionality and self care  Description: INTERVENTIONS  - Monitor swallowing and airway patency with patient fatigue and changes in neurological status  - Encourage and assist patient to increase activity and self care with guidance from PT/OT  - Encourage visually impaired, hearing impaired and aphasic patients to use assistive/communication devices  Outcome: Progressing

## 2024-11-13 NOTE — PHYSICAL THERAPY NOTE
PHYSICAL THERAPY TREATMENT NOTE - INPATIENT     Room Number: 513/513-A       Presenting Problem: leg swelling, urinary incontinence, syncope in ED, dizziness  Co-Morbidities : uncontrolled HTN, OA, chronic HFpEF, HLD, CAD s/p PCI of LAD, CVA, dysarthria    Problem List  Principal Problem:    Leg swelling  Active Problems:    Syncope and collapse    Incontinence of feces    Incontinence    PHYSICAL THERAPY ASSESSMENT   Patient demonstrates fair progress this session, goals  remain in progress.      Patient is requiring stand-by assist as a result of the following impairments: decreased functional strength, decreased endurance/aerobic capacity, pain, impaired standing balance, decreased muscular endurance, and medical status.     Patient continues to function near baseline with bed mobility, transfers, gait, stair negotiation, maintaining seated position, standing prolonged periods, and performing household tasks.  Next session anticipate patient to progress bed mobility, transfers, gait, stair negotiation, maintaining seated position, standing prolonged periods, and performing household tasks.  Physical Therapy will continue to follow patient for duration of hospitalization.    Patient continues to benefit from continued skilled PT services: at discharge to promote prior level of function and safety with additional support and return home with OP PT.    PLAN DURING HOSPITALIZATION  Nursing Mobility Recommendation : 1 Assist  PT Device Recommendation: None  PT Treatment Plan: Body mechanics;Bed mobility;Coordination;Endurance;Energy conservation;Gait training;Strengthening;Stair training;Transfer training;Balance training  Frequency (Obs): 5x/week     SUBJECTIVE  \"My knees are hurting today\"    OBJECTIVE  Precautions: Bed/chair alarm    PAIN ASSESSMENT   Rating: Unable to rate  Location: bilateral knees  Management Techniques: Body mechanics;Activity promotion;Breathing  techniques;Relaxation;Repositioning    BALANCE  Static Sitting: Good  Dynamic Sitting: Fair  Static Standing: Fair  Dynamic Standing: Poor    AM-PAC '6-Clicks' INPATIENT SHORT FORM - BASIC MOBILITY  How much difficulty does the patient currently have...  Patient Difficulty: Turning over in bed (including adjusting bedclothes, sheets and blankets)?: A Little   Patient Difficulty: Sitting down on and standing up from a chair with arms (e.g., wheelchair, bedside commode, etc.): A Little   Patient Difficulty: Moving from lying on back to sitting on the side of the bed?: A Little   How much help from another person does the patient currently need...   Help from Another: Moving to and from a bed to a chair (including a wheelchair)?: A Little   Help from Another: Need to walk in hospital room?: A Little   Help from Another: Climbing 3-5 steps with a railing?: A Little     AM-PAC Score:  Raw Score: 18   Approx Degree of Impairment: 46.58%   Standardized Score (AM-PAC Scale): 43.63   CMS Modifier (G-Code): CK    FUNCTIONAL ABILITY STATUS  Functional Mobility/Gait Assessment  Gait Assistance: Contact guard assist (progressing to SBA)  Distance (ft): ~180 feet  Assistive Device: None  Pattern:  (decreased skyla, decreased step length, forward flexed posture, narrow base of support, verbal cues for sequencing.)  Rolling:  not tested  Supine to Sit:  not tested (patient seated at edge of bed at initiation of session)  Sit to Supine:  not tested  Sit to Stand: stand-by assist    Skilled Therapy Provided: Patient received seated at edge of bed at initiation of session agreeable to participation in PT. Patient tolerates treatment well, demonstrates improvement in ambulation tolerance and decreased level of physical assistance for functional mobility in comparison to previous session. Patient able to ambulate ~180 feet with no assistive device, CGA progressing to SBA. Patient left in bed, lines intact, needs in reach and handoff  to RN.    The patient's Approx Degree of Impairment: 46.58% has been calculated based on documentation in the [facility] '6 clicks' Inpatient Daily Activity Short Form.  Research supports that patients with this level of impairment may benefit from home.  Final disposition will be made by interdisciplinary medical team.    THERAPEUTIC EXERCISES  Lower Extremity Ankle pumps  Knee extension  Leg slides     Position Sitting       Patient End of Session: In bed;Call light within reach;Needs met;RN aware of session/findings;All patient questions and concerns addressed;Hospital anti-slip socks    CURRENT GOALS   Goals to be met by: 11/19/24  Patient Goal Patient's self-stated goal is: go home   Goal #1 Patient is able to demonstrate supine - sit EOB @ level: modified independent      Goal #1   Current Status  NT   Goal #2 Patient is able to demonstrate transfers Sit to/from Stand at assistance level: modified independent with none      Goal #2  Current Status  SBA   Goal #3 Patient is able to ambulate 300 feet with assist device: none at assistance level: modified independent   Goal #3   Current Status  180 feet no assistive device CGA progressing to SBA   Goal #4 Patient will negotiate 12 stairs/one curb w/ assistive device and supervision   Goal #4   Current Status  NT   Goal #5 Patient to demonstrate independence with home activity/exercise instructions provided to patient in preparation for discharge.   Goal #5   Current Status  Progressing     Gait Training: 15 minutes    Emily Angeles PT, DPT

## 2024-11-14 VITALS
TEMPERATURE: 99 F | HEART RATE: 76 BPM | OXYGEN SATURATION: 96 % | WEIGHT: 266.81 LBS | HEIGHT: 74 IN | SYSTOLIC BLOOD PRESSURE: 121 MMHG | BODY MASS INDEX: 34.24 KG/M2 | DIASTOLIC BLOOD PRESSURE: 97 MMHG | RESPIRATION RATE: 20 BRPM

## 2024-11-14 LAB
ANION GAP SERPL CALC-SCNC: 5 MMOL/L (ref 0–18)
BASOPHILS # BLD AUTO: 0.05 X10(3) UL (ref 0–0.2)
BASOPHILS NFR BLD AUTO: 0.7 %
BUN BLD-MCNC: 23 MG/DL (ref 9–23)
BUN/CREAT SERPL: 13.3 (ref 10–20)
CALCIUM BLD-MCNC: 9.6 MG/DL (ref 8.7–10.4)
CHLORIDE SERPL-SCNC: 106 MMOL/L (ref 98–112)
CO2 SERPL-SCNC: 29 MMOL/L (ref 21–32)
CREAT BLD-MCNC: 1.73 MG/DL
DEPRECATED RDW RBC AUTO: 41.4 FL (ref 35.1–46.3)
EGFRCR SERPLBLD CKD-EPI 2021: 44 ML/MIN/1.73M2 (ref 60–?)
EOSINOPHIL # BLD AUTO: 0.19 X10(3) UL (ref 0–0.7)
EOSINOPHIL NFR BLD AUTO: 2.8 %
ERYTHROCYTE [DISTWIDTH] IN BLOOD BY AUTOMATED COUNT: 14.1 % (ref 11–15)
GLUCOSE BLD-MCNC: 115 MG/DL (ref 70–99)
HCT VFR BLD AUTO: 40.7 %
HGB BLD-MCNC: 13.8 G/DL
IMM GRANULOCYTES # BLD AUTO: 0.12 X10(3) UL (ref 0–1)
IMM GRANULOCYTES NFR BLD: 1.8 %
LYMPHOCYTES # BLD AUTO: 1.33 X10(3) UL (ref 1–4)
LYMPHOCYTES NFR BLD AUTO: 19.8 %
MAGNESIUM SERPL-MCNC: 2.1 MG/DL (ref 1.6–2.6)
MCH RBC QN AUTO: 27.5 PG (ref 26–34)
MCHC RBC AUTO-ENTMCNC: 33.9 G/DL (ref 31–37)
MCV RBC AUTO: 81.2 FL
MONOCYTES # BLD AUTO: 0.73 X10(3) UL (ref 0.1–1)
MONOCYTES NFR BLD AUTO: 10.9 %
NEUTROPHILS # BLD AUTO: 4.29 X10 (3) UL (ref 1.5–7.7)
NEUTROPHILS # BLD AUTO: 4.29 X10(3) UL (ref 1.5–7.7)
NEUTROPHILS NFR BLD AUTO: 64 %
OSMOLALITY SERPL CALC.SUM OF ELEC: 295 MOSM/KG (ref 275–295)
PLATELET # BLD AUTO: 168 10(3)UL (ref 150–450)
POTASSIUM SERPL-SCNC: 3.9 MMOL/L (ref 3.5–5.1)
RBC # BLD AUTO: 5.01 X10(6)UL
SODIUM SERPL-SCNC: 140 MMOL/L (ref 136–145)
WBC # BLD AUTO: 6.7 X10(3) UL (ref 4–11)

## 2024-11-14 NOTE — CONSULTS
NEPHROLOGY CONSULT NOTE       DATE: 11/14/2024    Requesting Physician: FIDELINA    Reason for Consult: Dr. Qureshi     HISTORY OF PRESENT ILLNESS: Martha Garcia is a 59 year old male with PMH sig for HTN, HFpEF, CAD, HLD, stroke.  Presented with urinary and bowel incontinence and lower extremity edema.  Doppler was negative for DVT. While in the ER, patient had a syncopal episode. Both Neurology and cardiology are following.  Nephrology is consulted for FIDELINA with sCr of 1.73 today. Baseline sCr ~ 1.4-1.6.  Patient is currently sitting at the edge of the bed and feels a little dizzy. Denies SOB, CP, n/v or abd pain.  Denies urinary incontinence today. Denies diarrhea.  Reports lower extremity swelling is improving.       MEDICAL HISTORY:   Past Medical History:    Coronary atherosclerosis    CVA (cerebral vascular accident) (HCC)    Essential hypertension    High blood pressure    Hyperlipidemia       SURGICAL HISTORY:   Past Surgical History:   Procedure Laterality Date    Cath drug eluting stent  02/13/2020    PTCA KIMBERLY LAD with kissing balloon PTCA LAD/Diag       FAMILY HISTORY: History reviewed. No pertinent family history.    SOCIAL HISTORY:   Social History     Socioeconomic History    Marital status:      Spouse name: Not on file    Number of children: Not on file    Years of education: Not on file    Highest education level: Not on file   Occupational History    Not on file   Tobacco Use    Smoking status: Never    Smokeless tobacco: Never   Vaping Use    Vaping status: Never Used   Substance and Sexual Activity    Alcohol use: Yes     Comment: on rare occasions    Drug use: Never    Sexual activity: Not on file   Other Topics Concern    Not on file   Social History Narrative    Not on file     Social Drivers of Health     Financial Resource Strain: Not on file   Food Insecurity: No Food Insecurity (11/11/2024)    Food Insecurity     Food Insecurity: Never true   Transportation Needs: No Transportation Needs  (11/11/2024)    Transportation Needs     Lack of Transportation: No     Car Seat: Not on file   Physical Activity: Not on file   Stress: Not on file   Social Connections: Not on file   Housing Stability: Low Risk  (11/11/2024)    Housing Stability     Housing Instability: No     Housing Instability Emergency: Not on file     Crib or Bassinette: Not on file       MEDICATIONS:   Current Facility-Administered Medications   Medication Dose Route Frequency    cloNIDine (Catapres) tab 0.2 mg  0.2 mg Oral BID    lisinopril (Prinivil; Zestril) tab 40 mg  40 mg Oral Nightly    amLODIPine (Norvasc) tab 5 mg  5 mg Oral Daily    lidocaine-menthol 4-1 % patch 1 patch  1 patch Transdermal Daily    traMADol (Ultram) tab 50 mg  50 mg Oral Q12H PRN    HYDROcodone-acetaminophen (Norco)  MG per tab 1 tablet  1 tablet Oral Q6H PRN    acetaminophen (Tylenol Extra Strength) tab 1,000 mg  1,000 mg Oral Q6H PRN    melatonin tab 3 mg  3 mg Oral Nightly PRN    polyethylene glycol (PEG 3350) (Miralax) 17 g oral packet 17 g  17 g Oral Daily PRN    sennosides (Senokot) tab 17.2 mg  17.2 mg Oral Nightly PRN    bisacodyl (Dulcolax) 10 MG rectal suppository 10 mg  10 mg Rectal Daily PRN    ondansetron (Zofran) 4 MG/2ML injection 4 mg  4 mg Intravenous Q6H PRN    hydrALAZINE (Apresoline) tab 25 mg  25 mg Oral Q8H PRN    aspirin DR tab 81 mg  81 mg Oral Daily    atorvastatin (Lipitor) tab 80 mg  80 mg Oral Nightly    metoprolol tartrate (Lopressor) tab 100 mg  100 mg Oral 2x Daily(Beta Blocker)    spironolactone (Aldactone) tab 25 mg  25 mg Oral Daily         ALLERGIES: Allergies[1]    REVIEW OF SYSTEMS:  A comprehensive review of systems was conducted and is negative except for what is mentioned in the HPI        PHYSICAL EXAM:  BP (!) 132/96 (BP Location: Left arm)   Pulse 73   Temp 98.7 °F (37.1 °C) (Oral)   Resp 20   Ht 6' 2\" (1.88 m)   Wt 266 lb 12.8 oz (121 kg)   SpO2 97%   BMI 34.26 kg/m²   GEN: NAD  HEENT: NCAT    CHEST: CTA  b/l  CARDIAC: S1S2 normal  ABD: soft, NT/ND  EXT: + LE edema, R > L   NEURO: awake, alert  SKIN: warm, dry      LABS:  Recent Labs   Lab 11/12/24 0454 11/13/24 0605 11/14/24 0424   * 106* 115*   BUN 15 19 23   CREATSERUM 1.51* 1.46* 1.73*   EGFRCR 52* 54* 44*   CA 9.7 9.4 9.6    142 140   K 3.7 4.2 3.9    109 106   CO2 25.0 26.0 29.0     Recent Labs   Lab 11/12/24 0454 11/13/24 0605 11/14/24 0424   RBC 5.06 5.00 5.01   HGB 14.0 13.3 13.8   HCT 41.3 40.5 40.7   MCV 81.6 81.0 81.2   MCH 27.7 26.6 27.5   MCHC 33.9 32.8 33.9   RDW 14.2 14.3 14.1   NEPRELIM 3.86 3.23 4.29   WBC 6.0 5.4 6.7   .0* 156.0 168.0           INTAKE/OUTPUT:    Intake/Output Summary (Last 24 hours) at 11/14/2024 1059  Last data filed at 11/14/2024 0620  Gross per 24 hour   Intake 2050 ml   Output --   Net 2050 ml         IMAGING:  CT ABDOMEN+PELVIS(CPT=74176)    Result Date: 11/13/2024  CONCLUSION:   Multifocal small bowel circumferential wall thickening with fat stranding in the surrounding mesentery in the left lower quadrant.  Circumferential wall thickening of the cecum and ascending colon also noted which in part could relate to lack of distension with some mild fat stranding adjacent to the cecum.  Overall these findings suggest an enterocolitis.  There is no evidence of pneumatosis or pneumoperitoneum.  Mild circumferential urinary bladder wall thickening also noted which in part could relate to lack of distension.  Correlation with urinalysis advised as a cystitis is not excluded.  Lesser incidental findings as above.     Dictated by (CST): Abhi Perkins MD on 11/13/2024 at 8:19 PM     Finalized by (CST): Abhi Perkins MD on 11/13/2024 at 8:25 PM          MRI SPINE LUMBAR (W+WO) (CPT=72158)    Result Date: 11/13/2024  CONCLUSION:  1. No acute lumbar spine fracture.  No suspicious postcontrast enhancement throughout the lumbar spine.  Conus medullaris and cauda equina nerve roots demonstrate an  unremarkable MR appearance.  No evidence of a lumbar arterial venous malformation as clinically queried. 2. Mild multilevel lumbar spine degenerative changes as detailed.  Findings result in scattered mild neural foraminal stenoses at L3-L4 and L4-L5 as described. 3. Small degenerative Schmorl's node along the inferior L5 endplate with surrounding edema. 4. Additional mild linear edema at the right L4 pedicle suggests degenerative stress response or sequelae of altered biomechanics. 5. Lesser incidental findings as above.   elm-remote  Dictated by (CST): Marcelo Ravi MD on 11/13/2024 at 12:33 PM     Finalized by (CST): Marcelo Raiv MD on 11/13/2024 at 12:38 PM          MRI SPINE THORACIC (W+WO) (CPT=72157)    Result Date: 11/13/2024  CONCLUSION:  1. No acute thoracic spine fracture. Normal caliber and signal characteristics of the thoracic spinal cord.  No suspicious postcontrast enhancement throughout the thoracic spine.  No definite MR manifestations of thoracic spine arteriovenous malformation  as clinically queried. 2. Multilevel thoracic spine degenerative changes as detailed.  Findings result in multilevel mild-to-moderate scattered neural foraminal stenoses (most pronounced on the left at T11-T12) as described.  No significant spinal canal stenosis throughout the  thoracic spine. 3. Enhancing 2.5 cm cutaneous/subcutaneous nodule in the midline posterior soft tissues at the T12 vertebral body level is unchanged since chest CT from 2020.  This likely relates to a sebaceous cyst or other dermatologic abnormality. 4. Lesser incidental findings as above.   elm-remote  Dictated by (CST): Marcelo Ravi MD on 11/13/2024 at 12:18 PM     Finalized by (CST): Marcelo Ravi MD on 11/13/2024 at 12:32 PM          MRI SPINE CERVICAL (W+WO) (CPT=72156)    Result Date: 11/13/2024  CONCLUSION:   Degenerative disc, facet, uncovertebral joint disease throughout the cervical spine, most prominent at C3 through C7 where  there is moderate narrowing of the canal and bilateral neural foramen.  No gross cord edema or myelomalacia given limitations from motion artifact.     Dictated by (CST): Morgan Kim MD on 11/13/2024 at 11:55 AM     Finalized by (CST): Morgan Kim MD on 11/13/2024 at 11:58 AM              ASSESSMENT AND PLAN:   This is a 59 year old male with PMH sig for HTN, HFpEF, CAD, HLD, stroke.  Presented with urinary and bowel incontinence and lower extremity edema. Nephrology is consulted for FIDELINA    FIDELINA on CKD stage 3   - Baseline sCr ~ 1.4-1.6.  - sCr 1.65 on admission  --> 1.73 today    - UA bland  - CT abd neg for hydronephrosis and calculi   - lisinopril was recently increased which may explain mild increase in creatinine.  Patient did receive IV contrast for CT abd on 11/13, however timing appears too soon for BECKI   - OK to continue lisinopril and spironolactone at this time - BP is better controlled.  Will need close monitoring of kidney function as an outpatient.   - follow renal fxn and I/Os     HTN  - amlodipine, clonidine, lisinopril, metoprolol, spironolactone   - BP meds being adjusted by cards     Syncope    - orthostatics negative today  - per primary      Dispo: stable for discharge from a nephrology standpoint once cleared by the other services.  Follow up with nephro APN within 1 week.     Dw Dr. Qureshi and RN     Thank you for the consult and allowing us to participate in the care of Martha Garcia.      Maral Casillas MD  [facility] Nephrology  11/14/2024         [1] No Known Allergies

## 2024-11-14 NOTE — DISCHARGE INSTRUCTIONS
You need fu with your PCP in 1 week, for BP check and labs to check your kidney function.    You need to fu with nephrology in 1 week to monitor kidney function.    Please follow up with cardiology in 1-2 weeks    Please discuss with your PCP regarding follow up with GI and urology if your symptoms persist.    Please follow up with neurology in 1 month.

## 2024-11-14 NOTE — PAYOR COMM NOTE
--------------  ADMISSION REVIEW     Payor: JOELLE RODRIGUEZ POS/MCNP  Subscriber #:  [id number]  Authorization Number: [id number]    ADMIT TO INPT STATUS 11/13/24  ADMIT TO OBSERVATION 11/11/24 11/11 Patient Seen in: [facility] Emergency Department    History   Stated Complaint: swollen legs/urinary incontinence    Patient complains of swollen right leg for several days.  No chest pain or shortness of breath.  Also complains of some urinary frequency.  States he has been incontinent of urine couple of times..    Alleviating factors: none  Exacerbating factors: none  Denies dyspnea exertion orthopnea can lay flat with no trouble.    Past Medical History:    Coronary atherosclerosis    CVA (cerebral vascular accident) (HCC)    Essential hypertension    High blood pressure    Hyperlipidemia     Past Surgical History:   Procedure Laterality Date    Cath drug eluting stent  02/13/2020    PTCA KIMBERLY LAD with kissing balloon PTCA LAD/Diag     Physical Exam     ED Triage Vitals [11/11/24 0816]   BP (!) 195/115   Pulse 93   Resp 18   Temp 98 °F (36.7 °C)   Temp src Oral   SpO2 97 %   O2 Device None (Room air)     Current:BP (!) 144/97   Pulse 94   Temp 98 °F (36.7 °C) (Oral)   Resp 16   Ht 188 cm (6' 2\")   Wt 129.3 kg   SpO2 97%   BMI 36.59 kg/m²    PULSE OX nl  GENERAL: awake alert  HEAD: normocephalic, atraumatic,   EYES: PERRLA, EOMI, conj sclera clear  THROAT: mmm, no lesions  NECK: supple, no meningeal signs  LUNGS: no resp distress, cta bilateral  CARDIO: RRR without murmur  GI: abdomen is soft and non tender, no masses, nl bowel sounds   EXTREMITIES: from, 5/5 strength in all 4 ext, r lower leg 2+ edema  NEURO: alert and oiented *3, 2-12 intact, no focal deficit noted  SKIN: good skin turgor, no  rashes  PSYCH: calm, cooperative,    Differential includes:dvt vs venous insufficiency vs. chf    Labs Reviewed   CBC WITH DIFFERENTIAL WITH PLATELET - Abnormal; Notable for the following components:       Result  Value    .0 (*)     Immature Platelet Fraction 16.6 (*)     Lymphocyte Absolute 0.91 (*)     All other components within normal limits   BASIC METABOLIC PANEL (8) - Abnormal; Notable for the following components:    Glucose 115 (*)     Creatinine 1.65 (*)     BUN/CREA Ratio 7.9 (*)     Calculated Osmolality 297 (*)     eGFR-Cr 47 (*)     All other components within normal limits   URINALYSIS WITH CULTURE REFLEX - Abnormal; Notable for the following components:    Urine Color Colorless (*)     All other components within normal limits   POCT GLUCOSE - Abnormal; Notable for the following components:    POC Glucose  120 (*)     All other components within normal limits   BNP (B TYPE NATRIURETIC PEPTIDE) - Normal   TROPONIN I HIGH SENSITIVITY - Normal   SCAN SLIDE     EKG 72  nsr with lvh    US VENOUS DOPPLER LEG RIGHT  1. Normal right leg venous duplex exam. 2. No deep venous thrombosis.      Medical Decision Making  Patient's blood pressure improved with medication.  Ultrasound negative for DVT.  Patient was being discharged while standing for prolonged period he had a syncopal event.  No injury he was sweaty diaphoretic placed in the bed became more alert oriented denies injury.  Blood pressure normal.    Problems Addressed:  Leg swelling: acute illness or injury  Syncope and collapse: acute illness or injury    Disposition and Plan     Clinical Impression:  1. Leg swelling    2. Syncope and collapse        History and Physical      History of Present Illness: Mr. Garcia is a 59 year old male with PMH sig for Uncontrolled HTN, OA, chronic HFpEF, HLD, CAD s/p PCI of LAD, hs of stroke, who presents with bilateral leg swelling, urinary incontinence.  Patient is a poor historian, but states that for the past few day he has been intermittently incontinent of urine, at times noting his underwear and pants are soaked, and other times notes the urge but can't make it to the bathroom in time.  He denies burning or pain,  no abd pain or back pain, no fevers or chills.  He also notes an episode while in the ER of incontinence of stool he went to check his pants and noted stool in his underwear, this hasn't happened before, denies diarrhea.  He has no lower ext weakness, no pain in his spine, no other acute findings.  He did mention a 3 day episode of urinary incontinence last year this time that resolved on its own without intervention.   He also notes increasing lower ext swelling of bilateral lower ext with R>L over the last few months.  He denies CP or sob, no orthopnea, not other complaints.  In the ER he was getting up to walk to the bathroom felt lightheaded and dizzy and had a syncopal episode, denies CP or sob, denies syncope at home.       He notes being non-compliant with most of his medications, takes them once in a while, dose not follow a specific diet.       OBJECTIVE:  BP (!) 152/111   Pulse 107   Temp 98 °F (36.7 °C) (Oral)   Resp 19   Ht 6' 2\" (1.88 m)   Wt 285 lb (129.3 kg)   SpO2 95%   BMI 36.59 kg/m²   General:  Alert, no distress   Head:  Normocephalic, without obvious abnormality, atraumatic.   Eyes:  Sclera anicteric, No conjunctival pallor,     Nose: Nares normal. Septum midline.    Throat: Lips, mucosa, and tongue normal. Teeth and gums normal.   Neck: Supple,    Lungs:   Clear to auscultation bilaterally. Normal effort   Chest wall:  No tenderness or deformity.   Heart:  Regular rate and rhythm    Abdomen:   Soft, non-tender. Bowel sounds normal.     Extremities: Extremities normal, + 1-2 edema bilateral lower ext   Rectal exam: Brown stool noted on underwear and near rectum patient not aware of this, rectal tone appropriate    Skin: Skin color, texture, turgor normal. No rashes or lesions.    Neurologic: Normal strength, no focal deficit appreciated          ASSESSMENT / PLAN:   Mr. Garcia is a 59 year old male with PMH sig for Uncontrolled HTN, OA, chronic HFpEF, HLD, CAD s/p PCI of LAD, hs of stroke,  who presents with bilateral leg swelling, urinary incontinence.      Urinary and bowel incontinence  Dizziness / slurred speech   - notes 3-4 days of intermittent urinary incontinence PTA  - had episode of bowel incontinence in ER, stool also noted on underwear during rectal exam  - denies saddle anesthesia   - rectal exam with normal rectal tone  - no lower ext weakness, no back pain   - admits to intermittent slurred speech for the last year (since his stroke in 2023) not sig worse today  - this afternoon notes feeling lightheaded and dizzy   - UA negative, no signs of infection  - Neurology consulted, discussed with neuro  - Plan for Stat MRI, Lumbar spine MRI  - EEG ordered, to rule out seizure activity       Syncope  - trop negative  - no focal weakness  - tele continued  - check ECHO  - MRI as above     Bilateral leg swell  - venous doppler negative  - UA without proteinuria  - BNP normal  - venous insuff? Compression stocking  - gentle diuresis      HTN urgency  - patient is admittedly non-compliant with his BP medications  - not consistent with any BP medications  - med rec reviewed will resume prior home med list     Chronic HFpEF  - BNP normal  - will check ECHO  - BP control     CAD  - hs of stent in 2020  - resume aspirin, statin  - check lipids  - BB resumed as well     CKD  - cre 1.65  - has 1.3-1.6  - likely due to uncontrolled HTN     TCP  - plts 141  - normal prior  - repeat in am     HLD  - check lipids  - resume statin     Hs of stroke  - MRI as above  - asa, statin      Non compliance   - discussed at length with patient and wife, importance of close medication adherence and follow up given hs of CAD, CHF, Strokes, HTN  - stated his understanding.     FN:  - IVF: none  - Diet: adv     DVT Prophy:scd, heparin (pending MRI findings)  Lines: PIV     Dispo: pending clinical course     Discussed with wife at bedside    11/12:    CARDS:    Reason for Consultation:  Syncope     History of Present  Illness:  Martha Garcia is a a(n) 59 year old male with pmh CAD, HTN, HL, CVA and noncompliance admitted with syncope.  Patient initially presenting to ER 11/11/2024 with multiple complaints including leg swelling, knee pain, urinary frequency.  Trop negative and BNP normal.  Patient given IV lasix and then planned for discharge, however had subsequent syncopal episode and then admitted.  Patient denies chest pain, palpitations, dyspnea, orthopnea, PND.       On admission patient afeb, HR 93, /115.  Trop negative, BNP wnl.  ECG with SR, PACs, lat TWI.  Venous doppler negative for DVT, MRI brain without acute findings.  Cardiology consulted for further eval and management.     Physical Exam:  Blood pressure (!) 160/93, pulse 72, temperature 98.2 °F (36.8 °C), temperature source Oral, resp. rate 18, height 6' 2\" (1.88 m), weight 266 lb (120.7 kg), SpO2 96%.     General: awake, alert, oriented x 3, no acute distress  HEENT: at/nc, perrl, eomi  Neck: No JVD, carotids 2+ no bruits.  Cardiac: Regular rate and rhythm, S1, S2 normal, no murmur, rub or gallop.  Lungs: Clear without wheezes, rales, rhonchi or dullness.  Normal excursions and effort.  Abdomen: Soft, non-tender, non-distended, normal bowel sounds   Extremities: 1+ pitting edema bilat LEs  Neurologic: Alert and oriented, normal affect.  Psych: normal mood and affect  Skin: Warm and dry.      EKG: SR, PACs, lat TWI     Assessment/Plan:  59 year old male presenting with:     1) LE edema/Knee pain  2) Syncope following IV lasix in ER  3) CAD s/p PCI LAD 2020  4) HTN  5) HL  6) CVA  7) CKD  8) Noncompliance with medications and follow up     - ECG on admission with SR, PACs, lat TWI; trop negative, BNP wnl  - Suspect syncopal episode possibly orthostatic in the setting of IV lasix administration; MRI brain without acute findings  - TTE ordered and pending  - Reduce amlodipine to 5mg daily (may be contributing to edema)  - Increase lisinopril to 40mg at  bedtime  - Clonidine to 0.2mg bid to hopefully improve compliance (currently prescribed TID?)  - Cont metoprolol, spironolactone  - Cont, asa, statin  - Monitor on tele  - Will follow       NEURO:      Martha Garcia is a 59 year old right handed male w/ a pmhx sig. for   HTN HLD CAD prior stroke, significant cerebrovascular disease/small vessel disease who is being evaluated for 2 episodes of fecal incontinence and evaluation of sudden loss of consciousness/syncope.  HPI primarily per the patient as well as his hospitalist Dr. Qureshi.  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  He was awake.     He reports in 2 days ago while he was getting ready to leave the hospital to go for haircut he stood up and lost consciousness.  Denies any lateral tongue biting.  While he was seen in our emergency department the hospitalist noted that he had fecal incontinence.  Patient had stool in his pants.  He did have a rectal exam which revealed normal tone.  There is concern for neurological etiology.  The patient's had an MRI of the brain and lumbar spine which provided no answer.  He also had a routine EEG completed to evaluate his sudden loss of consciousness.  It was also unrevealing.  There was no focal slowing.     He provides very limited history.  He reports that he is only had the 2 episodes of incontinence.  He denies any other symptoms concerning for seizures.  He denies any new focal neurological deficits.  He agrees that he could have improved adherence with his blood pressure medication.     Patient had a bladder scan with no significant post residual volume.      Martha Garcia is a 59 year old right handed male w/ a pmhx sig. for   HTN HLD CAD prior stroke, significant cerebrovascular disease/small vessel disease who is being evaluated for 2 episodes of fecal incontinence and evaluation of sudden loss of consciousness/syncope.  HPI primarily per  the patient as well as his hospitalist Dr. Qureshi.  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  He was awake.     He reports in 2 days ago while he was getting ready to leave the hospital to go for haircut he stood up and lost consciousness.  Denies any lateral tongue biting.  While he was seen in our emergency department the hospitalist noted that he had fecal incontinence.  Patient had stool in his pants.  He did have a rectal exam which revealed normal tone.  There is concern for neurological etiology.  The patient's had an MRI of the brain and lumbar spine which provided no answer.  He also had a routine EEG completed to evaluate his sudden loss of consciousness.  It was also unrevealing.  There was no focal slowing.     He provides very limited history.  He reports that he is only had the 2 episodes of incontinence.  He denies any other symptoms concerning for seizures.  He denies any new focal neurological deficits.  He agrees that he could have improved adherence with his blood pressure medication.     Patient had a bladder scan with no significant post residual volume.      Component Value Date     WBC 6.0 11/12/2024     HGB 14.0 11/12/2024     HCT 41.3 11/12/2024     .0 (L) 11/12/2024     CREATSERUM 1.51 (H) 11/12/2024     BUN 15 11/12/2024      11/12/2024     K 3.7 11/12/2024      11/12/2024     CO2 25.0 11/12/2024      (H) 11/12/2024     CA 9.7 11/12/2024     ALB 4.2 11/12/2024     ALKPHO 76 11/12/2024     TP 7.5 11/12/2024     AST 16 11/12/2024     ALT 13 11/12/2024     MG 2.2 11/12/2024       Component Value Date      11/12/2024     HDL 32 11/12/2024     TRIG 113 11/12/2024     VLDL 23      MRI SPINE LUMBAR  Result Date: 11/11/2024  CONCLUSION:  1. Mild -moderate multilevel disc and facet degeneration.  No significant central narrowing.  No finding to account for the patient's symptoms. 2. A 2.8 x  1.7 cm subcutaneous mass posterior to the T12 level.  Findings may represent an epidermoid/sebaceous cyst which is unchanged from 02/12/2020 CT. 3. Distended urinary bladder.    MRI BRAIN    Result Date: 11/11/2024  CONCLUSION:   No evidence of acute infarction.  No evidence of acute intracranial abnormality.  Moderate chronic microvascular white matter ischemia.  Chronic thalamic and basal ganglia lacunar infarcts.  Chronic right cerebellar infarcts and small chronic left cerebellar infarct.  Chronic left pontine lacunar infarct.     EKG 12 Lead     Result Date: 11/11/2024  Sinus rhythm with Premature atrial complexes Minimal voltage criteria for LVH, may be normal variant ( Sokolow-C[place] ) T wave abnormality, consider lateral ischemia Abnormal ECG When compared with ECG of 12-AUG-2024 08:18, Premature atrial complexes are now Present Confirmed by JESUS BARRAGAN, BRIAN ([id number]) on 11/11/2024 1:35:55 PM         Assessment  Martha Garcia is a 59 year old right handed male w/ a pmhx sig. for   HTN HLD CAD prior stroke, significant cerebrovascular disease/small vessel disease who is being evaluated for 2 episodes of fecal incontinence and evaluation of sudden loss of consciousness/syncope.  Etiology of his symptoms is unclear.  May need a GI consult to evaluate for his incontinence.  Low suspicion this related to cervical myelopathy but will image his C and T-spine     With and without contrast.  Will also add his lumbar spine with and without contrast.  He is already completed a lumbar spine without contrast that per radiology did not provide any clear etiology.  The imaging was also reviewed by this author.  His routine EEG was normal.  Syncope and incontinence raise possibility for dysautonomia.  Will also investigate for other symptoms including neuropathy that could account for dysautonomia.  Lastly he does have significant cerebrovascular disease.  He needs improved adherence with his antihypertensives and needs to  follow recommendations for secondary stroke prevention given the severity of cerebrovascular disease.  Fecal incontinence can rarely  be associated w syncope. Does not account for episode while he was awake.  However, per Dr. Qureshi patients with spinal AVMs can have waxing waning episodes of incontinence.     Fecal incontinence  Differential Diagnosis:  Spinal avms per Dr. Qureshi. Greatly appreciate his input.   Suspect nonneurological etiology  Cervical or thoracic myelopathy  Peripheral process leading to dysautonomiaLumbar myelopathy due to infiltrative or neoplastic processes only visible with  contrast; doubt  Seizure; highly doubt  Interval stroke excluded     Diagnostics:  Consider GI workup    EEG completed  MRI brain completed  MRI lumbar spine completed  MRI CTL without contrast ordered.  Orthostatic vitals        2. Snoring  Rec cpap     3. Hx of stroke/significant cerebrovascular disease  Cont antithrombotics,  asa and high intensity statin, Cholesterol Meds: atorvastatin - 80 MG; atorvastatin Tabs - 80 MG; ezetimibe Tabs - 10 MG   .  Home BP monitoring. Pt instructed to check BP at home in the AM and PM, and bring values to future clinic visits. BP goal is for normotension, < 130/80.  HbA1c goal <7.0  LDL-C goal  <70 mg/dL.   Frequent exercise as per AHA/ASA recs (30 min of aerobic exercise, 5 times per week).  If snoring, consider referral for sleep study for possible CONSUELO.  Recommend DASH diet. A diet that is rich in fruits and vegetables and thereby high in potassium is beneficial. Recommend reduced intake of sodium and increased intake of potassium, increase consumption of fruits, vegetables, and low-fat dairy products and  decreased consumption of saturated fat.      HOSPITALIST:      Urinary and bowel incontinence  Dizziness / slurred speech   - notes 3-4 days of intermittent urinary incontinence PTA  - had episode of bowel incontinence in ER, stool also noted on underwear during rectal exam  - denies  saddle anesthesia   - rectal exam with normal rectal tone  - no lower ext weakness, no back pain   - admits to intermittent slurred speech for the last year (since his stroke in 2023) not sig worse today  - this afternoon notes feeling lightheaded and dizzy   - UA negative, no signs of infection  - Neurology consulted, discussed with neuro  - Plan for Stat MRI, Lumbar spine MRI-> Lumbar spine without spinal cord impingement, MRI brain without acute stroke but multiple lacunar strokes  - EEG ordered, to rule out seizure activity -> no active seizure activity      Syncope  - trop negative  - no focal weakness  - tele continued  - check ECHO - > EF 50-55%, mild/mod aortic regurg,   - MRI as above-> without bleeding or stroke  - orthostatics negative     Bilateral leg swell  - venous doppler negative  - UA without proteinuria  - BNP normal  - venous insuff? Compression stocking  - gentle diuresis      HTN urgency  - patient is admittedly non-compliant with his BP medications  - not consistent with any BP medications  - med rec reviewed will resume prior home med list     Chronic HFpEF  - BNP normal  -  ECHO -> EF 50-55%, mild/mod aortic regurg,   - BP control     CAD  - hs of stent in 2020  - resume aspirin, statin  - check lipids -> uncontrolled , , not compliant   - BB resumed as well  - cardiology evaluated      CKD  - cre 1.65  - has 1.3-1.6  - likely due to uncontrolled HTN     TCP  - plts 141  - normal prior  - repeat stable      HLD  - - check lipids -> uncontrolled , , not compliant   - resume statin     Hs of stroke  - MRI as above  - asa, statin      Non compliance   - discussed at length with patient and wife, importance of close medication adherence and follow up given hs of CAD, CHF, Strokes, HTN  - stated his understanding.     FN:  - IVF: none  - Diet: adv     DVT Prophy:scd, heparin   Lines: PIV     Dispo: pending clinical course       Subjective:      No CP, SOB, or N/V.   Denies bowel or bladder incontinence this morning, states he felt the urge to go, no saddle anesthsia     OBJECTIVE:     Blood pressure (!) 136/94, pulse 68, temperature 98.2 °F (36.8 °C), temperature source Oral, resp. rate 18, height 6' 2\" (1.88 m), weight 266 lb (120.7 kg), SpO2 96%.    Scheduled Medications    cloNIDine  0.2 mg Oral BID    lisinopril  40 mg Oral Nightly    [START ON 11/13/2024] amLODIPine  5 mg Oral Daily    aspirin  81 mg Oral Daily    atorvastatin  80 mg Oral Nightly    metoprolol tartrate  100 mg Oral 2x Daily(Beta Blocker)    spironolactone  25 mg Oral Daily               11/12/24 1242 11/12/24 1246 11/12/24 1247   Vital Signs   Pulse 63 66 69   Heart Rate Source Monitor Monitor Monitor   Resp 17  --   --    Respiratory Quality Normal  --   --    BP (!) 120/93 125/90 (!) 127/91   MAP (mmHg) 100 (!) 101 (!) 102   BP Location Right arm Right arm Right arm   BP Method Automatic Automatic Automatic   Patient Position Semi-[place]aram[place] Sitting Standing           11/12/24 1706 11/12/24 1709 11/12/24 1712   Vital Signs   Heart Rate Source Monitor Monitor Monitor   Resp 18  --   --    Respiratory Quality Normal  --   --    BP (!) 144/99 (!) 137/99 (!) 138/105   MAP (mmHg) (!) 112 (!) 111 (!) 116   BP Location Right arm Right arm Right arm   BP Method Automatic Automatic Automatic   Patient Position Lying Sitting Standing     NURSING:  Continuous telemetry monitoring in place. Neuro checks Q shift. Orthostatic hypotension checks Q 4 hrs       11/13:    HOSPITALIST:      Mr. Garcia is a 59 year old male with PMH sig for Uncontrolled HTN, OA, chronic HFpEF, HLD, CAD s/p PCI of LAD, hs of stroke, who presents with bilateral leg swelling, urinary incontinence,      Urinary and bowel incontinence  Dizziness / slurred speech   - notes 3-4 days of intermittent urinary incontinence PTA  - had episode of bowel incontinence in ER, stool also noted on underwear during rectal exam  - denies saddle anesthesia   - rectal  exam with normal rectal tone  - no lower ext weakness, no back pain   - admits to intermittent slurred speech for the last year (since his stroke in 2023) not sig worse today  - this afternoon notes feeling lightheaded and dizzy   - UA negative, no signs of infection  - Neurology consulted, discussed with neuro  - Plan for Stat MRI, Lumbar spine MRI-> Lumbar spine without spinal cord impingement, MRI brain without acute stroke but multiple lacunar strokes  - EEG ordered, to rule out seizure activity -> no active seizure activity   - neuro evaluated and ordered C/T/L spine MRI with and without contrast   - ordering CT/ABD/Pelvis to further look for poss causes      Syncope  - trop negative  - no focal weakness  - tele continued  - check ECHO - > EF 50-55%, mild/mod aortic regurg,   - MRI as above-> without bleeding or stroke  - orthostatics negative     Bilateral leg swell  - venous doppler negative  - UA without proteinuria  - BNP normal  - venous insuff? Compression stocking  - gentle diuresis      HTN urgency  - patient is admittedly non-compliant with his BP medications  - not consistent with any BP medications  - med rec reviewed will resume prior home med list  - much improved      Chronic HFpEF  - BNP normal  -  ECHO -> EF 50-55%, mild/mod aortic regurg,   - BP control     CAD  - hs of stent in 2020  - resume aspirin, statin  - check lipids -> uncontrolled , , not compliant   - BB resumed as well  - cardiology evaluated      CKD  - cre 1.65  - has 1.3-1.6  - likely due to uncontrolled HTN     TCP  - plts 141  - normal prior  - repeat stable      HLD  - - check lipids -> uncontrolled , , not compliant   - resume statin     Hs of stroke  - MRI as above  - asa, statin      Non compliance   - discussed at length with patient and wife, importance of close medication adherence and follow up given hs of CAD, CHF, Strokes, HTN  - stated his understanding      FN:  - IVF: none  - Diet: adv      DVT Prophy:scd, heparin   Lines: PIV     Dispo: pending clinical course     Discussed with wife at bedside on admission, will call on phone, no answer called multiple times     Discussed with patient regarding possible etiologies, recommended continued work up regarding and close monitoring, patient understands this but doesn't want to stay in the hospital, he states he wants to go home, that he now feels fine.  Discussed the risks of further decompensation and worsening of symptoms or even death, states his understanding of above.       Came to bedside at 3:05 pm again discussed importance of further work up and diagnosis, patient understands this and does not want further work up, wants to go home, understands this will be against medical advice, and understands risks, advised close follow up with PCP as outpatient, and to be compliant with medications as directed.      Called wife *5, no answer, patient did not want to give wife's work number.        Subjective:      No CP, SOB, or N/V.  Denies bowel or bladder incontinence this morning, states he felt the urge to go, no saddle anesthsia        Blood pressure 119/81, pulse 64, temperature 97.4 °F (36.3 °C), temperature source Oral, resp. rate 18, height 6' 2\" (1.88 m), weight 266 lb 12.8 oz (121 kg), SpO2 97%.     Temp:  [97.4 °F (36.3 °C)-98.8 °F (37.1 °C)] 97.4 °F (36.3 °C)  Pulse:  [56-73] 64  Resp:  [17-19] 18  BP: (118-153)/() 119/81  SpO2:  [95 %-100 %] 97 %       Exam    Gen: No acute distress, alert and oriented x3   Heent: NC AT, neck supple  Pulm: Lungs clear, normal respiratory effort  CV: Heart with regular rate and rhythm, + edema of the right lower ext   Abd: Abdomen soft, nontender, nondistended, bowel sounds present  MSK: no clubbing, no cyanosis  Skin: no rashes or lesions  Neuro: AO*3, motor intact, no sensory deficits  Psyc: appropriate mood and affect        Lab 11/11/24  0908 11/12/24  0454 11/13/24  0605   RBC 5.06 5.06 5.00   HGB  13.7 14.0 13.3   HCT 41.7 41.3 40.5   MCV 82.4 81.6 81.0   MCH 27.1 27.7 26.6   MCHC 32.9 33.9 32.8   RDW 14.3 14.2 14.3   NEPRELIM 4.08 3.86 3.23   WBC 5.9 6.0 5.4   .0* 146.0* 156.0      * 113* 106*   BUN 13 15 19   CREATSERUM 1.65* 1.51* 1.46*   EGFRCR 47* 52* 54*   CA 9.8 9.7 9.4    145 142   K 3.9 3.7 4.2    110 109   CO2 27.0 25.0 26.0     MRI SPINE CERVICAL   Result Date: 11/13/2024  CONCLUSION:          Degenerative disc, facet, uncovertebral joint disease throughout the cervical spine, most prominent at C3 through C7 where there is moderate narrowing of the canal and bilateral neural foramen.  No gross cord edema or myelomalacia given limitations from motion artifact.           Scheduled Medications    cloNIDine  0.2 mg Oral BID    lisinopril  40 mg Oral Nightly    amLODIPine  5 mg Oral Daily    lidocaine-menthol  1 patch Transdermal Daily    morphINE PF  2 mg Intravenous Once    aspirin  81 mg Oral Daily    atorvastatin  80 mg Oral Nightly    metoprolol tartrate  100 mg Oral 2x Daily(Beta Blocker)    spironolactone  25 mg Oral Daily          11/14:     RENAL:    Martha Garcia is a 59 year old male with PMH sig for HTN, HFpEF, CAD, HLD, stroke.  Presented with urinary and bowel incontinence and lower extremity edema.  Doppler was negative for DVT. While in the ER, patient had a syncopal episode. Both Neurology and cardiology are following.  Nephrology is consulted for FIDELINA with sCr of 1.73 today. Baseline sCr ~ 1.4-1.6.  Patient is currently sitting at the edge of the bed and feels a little dizzy. Denies SOB, CP, n/v or abd pain.  Denies urinary incontinence today. Denies diarrhea.  Reports lower extremity swelling is improving.       PHYSICAL EXAM:  BP (!) 132/96 (BP Location: Left arm)   Pulse 73   Temp 98.7 °F (37.1 °C) (Oral)   Resp 20   Ht 6' 2\" (1.88 m)   Wt 266 lb 12.8 oz (121 kg)   SpO2 97%   BMI 34.26 kg/m²   GEN: NAD  HEENT: NCAT    CHEST: CTA b/l  CARDIAC: S1S2  normal  ABD: soft, NT/ND  EXT: + LE edema, R > L   NEURO: awake, alert  SKIN: warm, dry      Lab 11/12/24  0454 11/13/24  0605 11/14/24  0424   * 106* 115*   BUN 15 19 23   CREATSERUM 1.51* 1.46* 1.73*   EGFRCR 52* 54* 44*   CA 9.7 9.4 9.6    142 140   K 3.7 4.2 3.9    109 106   CO2 25.0 26.0 29.0      RBC 5.06 5.00 5.01   HGB 14.0 13.3 13.8   HCT 41.3 40.5 40.7   MCV 81.6 81.0 81.2   MCH 27.7 26.6 27.5   MCHC 33.9 32.8 33.9   RDW 14.2 14.3 14.1   NEPRELIM 3.86 3.23 4.29   WBC 6.0 5.4 6.7   .0* 156.0 168.0      CT ABDOMEN+PELVIS  Result Date: 11/13/2024  CONCLUSION:   Multifocal small bowel circumferential wall thickening with fat stranding in the surrounding mesentery in the left lower quadrant.  Circumferential wall thickening of the cecum and ascending colon also noted which in part could relate to lack of distension with some mild fat stranding adjacent to the cecum.  Overall these findings suggest an enterocolitis.  There is no evidence of pneumatosis or pneumoperitoneum.  Mild circumferential urinary bladder wall thickening also noted which in part could relate to lack of distension.  Correlation with urinalysis advised as a cystitis is not excluded.  Lesser incidental findings as above.      ASSESSMENT AND PLAN:   This is a 59 year old male with PMH sig for HTN, HFpEF, CAD, HLD, stroke.  Presented with urinary and bowel incontinence and lower extremity edema. Nephrology is consulted for FIDELINA     FIDELINA on CKD stage 3   - Baseline sCr ~ 1.4-1.6.  - sCr 1.65 on admission  --> 1.73 today    - UA bland  - CT abd neg for hydronephrosis and calculi   - lisinopril was recently increased which may explain mild increase in creatinine.  Patient did receive IV contrast for CT abd on 11/13, however timing appears too soon for BECKI   - OK to continue lisinopril and spironolactone at this time - BP is better controlled.  Will need close monitoring of kidney function as an outpatient.   - follow renal  fxn and I/Os      HTN  - amlodipine, clonidine, lisinopril, metoprolol, spironolactone   - BP meds being adjusted by cards      Syncope    - orthostatics negative today  - per primary      sodium chloride 0.9 % IV bolus 500 mL       Date Action Dose Route User    11/14/2024 1215 New Bag 500 mL Intravenous Niya Angeles, RN       Vitals (last day)       Date/Time Temp Pulse Resp BP SpO2 Weight O2 Device O2 Flow Rate (L/min) [facility]    11/14/24 1139 98.5 °F (36.9 °C) 73 20 132/96 97 % -- None (Room air) --     11/14/24 0907 98.7 °F (37.1 °C) 63 20 139/110 95 % -- None (Room air) --     11/14/24 0618 -- 68 -- 130/98 96 % -- None (Room air) -- AB    11/14/24 0617 -- 62 -- 130/100 98 % -- None (Room air) -- AB    11/14/24 0615 98.4 °F (36.9 °C) 61 18 126/87 100 % -- None (Room air) -- AB    11/13/24 2145 97.7 °F (36.5 °C) 56 18 126/90 98 % -- None (Room air) -- AB    11/13/24 1708 -- 66 18 133/91 9 % -- None (Room air) --     11/13/24 1155 -- -- -- 119/81 -- -- -- --     11/13/24 1154 -- -- -- 129/94 -- -- -- --     11/13/24 1150 97.4 °F (36.3 °C) 64 18 126/91 97 % -- None (Room air) --     11/13/24 0809 -- -- -- 135/101 99 % -- None (Room air) --     11/13/24 0808 -- -- -- 133/101 -- -- -- --     11/13/24 0807 97.5 °F (36.4 °C) 61 19 132/98 -- -- -- --     11/13/24 0610 -- 66 -- 136/91 98 % -- None (Room air) -- AB    11/13/24 0608 -- 66 -- 148/104 100 % -- None (Room air) -- AB    11/13/24 0605 -- 60 18 139/103 97 % -- None (Room air) -- AB    11/13/24 0605 -- -- -- -- -- 266 lb 12.8 oz (121 kg) -- -- AC    11/13/24 0144 -- 60 -- 127/91 98 % -- None (Room air) -- AB    11/13/24 0142 -- 60 -- 127/102 99 % -- None (Room air) -- AB

## 2024-11-14 NOTE — PLAN OF CARE
Discharge order in place. Tolerated ambulating the unit w/out any dizziness. AVS discussed with patient, made aware of follow up appointments and medication regimen. Patient states understanding education. Work letter provided. Wife to transport home. PIV removed.  Problem: Patient Centered Care  Goal: Patient preferences are identified and integrated in the patient's plan of care  Description: Interventions:  - What would you like us to know as we care for you? From home with wife  - Provide timely, complete, and accurate information to patient/family  - Incorporate patient and family knowledge, values, beliefs, and cultural backgrounds into the planning and delivery of care  - Encourage patient/family to participate in care and decision-making at the level they choose  - Honor patient and family perspectives and choices  Outcome: Progressing     Problem: Patient/Family Goals  Goal: Patient/Family Long Term Goal  Description: Patient's Long Term Goal: discharge from hospital    Interventions:  - monitor vital signs   - monitor appropriate labs  - pain management   - administer medications per order  - follow MD orders  - diagnostics per order  - update and inform patient and family on plan of care  - discharge planning  - See additional Care Plan goals for specific interventions  Outcome: Progressing  Goal: Patient/Family Short Term Goal  Description: Patient's Short Term Goal: improve incontinence     Interventions:   - monitor vital signs   - monitor appropriate labs  - pain management   - administer medications per order  - follow MD orders  - diagnostics per order  - update and inform patient and family on plan of care  - See additional Care Plan goals for specific interventions  Outcome: Progressing     Problem: SAFETY ADULT - FALL  Goal: Free from fall injury  Description: INTERVENTIONS:  - Assess pt frequently for physical needs  - Identify cognitive and physical deficits and behaviors that affect risk of  falls.  - [place] fall precautions as indicated by assessment.  - Educate pt/family on patient safety including physical limitations  - Instruct pt to call for assistance with activity based on assessment  - Modify environment to reduce risk of injury  - Provide assistive devices as appropriate  - Consider OT/PT consult to assist with strengthening/mobility  - Encourage toileting schedule  Outcome: Progressing     Problem: CARDIOVASCULAR - ADULT  Goal: Maintains optimal cardiac output and hemodynamic stability  Description: INTERVENTIONS:  - Monitor vital signs, rhythm, and trends  - Monitor for bleeding, hypotension and signs of decreased cardiac output  - Evaluate effectiveness of vasoactive medications to optimize hemodynamic stability  - Monitor arterial and/or venous puncture sites for bleeding and/or hematoma  - Assess quality of pulses, skin color and temperature  - Assess for signs of decreased coronary artery perfusion - ex. Angina  - Evaluate fluid balance, assess for edema, trend weights  Outcome: Progressing     Problem: SKIN/TISSUE INTEGRITY - ADULT  Goal: Skin integrity remains intact  Description: INTERVENTIONS  - Assess and document risk factors for pressure ulcer development  - Assess and document skin integrity  - Monitor for areas of redness and/or skin breakdown  - Initiate interventions, skin care algorithm/standards of care as needed  Outcome: Progressing     Problem: NEUROLOGICAL - ADULT  Goal: Achieves maximal functionality and self care  Description: INTERVENTIONS  - Monitor swallowing and airway patency with patient fatigue and changes in neurological status  - Encourage and assist patient to increase activity and self care with guidance from PT/OT  - Encourage visually impaired, hearing impaired and aphasic patients to use assistive/communication devices  Outcome: Progressing     Problem: PAIN - ADULT  Goal: Verbalizes/displays adequate comfort level or patient's stated pain  goal  Description: INTERVENTIONS:  - Encourage pt to monitor pain and request assistance  - Assess pain using appropriate pain scale  - Administer analgesics based on type and severity of pain and evaluate response  - Implement non-pharmacological measures as appropriate and evaluate response  - Consider cultural and social influences on pain and pain management  - Manage/alleviate anxiety  - Utilize distraction and/or relaxation techniques  - Monitor for opioid side effects  - Notify MD/LIP if interventions unsuccessful or patient reports new pain  - Anticipate increased pain with activity and pre-medicate as appropriate  Outcome: Progressing     Problem: DISCHARGE PLANNING  Goal: Discharge to home or other facility with appropriate resources  Description: INTERVENTIONS:  - Identify barriers to discharge w/pt and caregiver  - Include patient/family/discharge partner in discharge planning  - Arrange for needed discharge resources and transportation as appropriate  - Identify discharge learning needs (meds, wound care, etc)  - Arrange for interpreters to assist at discharge as needed  - Consider post-discharge preferences of patient/family/discharge partner  - Complete POLST form as appropriate  - Assess patient's ability to be responsible for managing their own health  - Refer to Case Management Department for coordinating discharge planning if the patient needs post-hospital services based on physician/LIP order or complex needs related to functional status, cognitive ability or social support system  Outcome: Progressing

## 2024-11-14 NOTE — PLAN OF CARE
Problem: Patient Centered Care  Goal: Patient preferences are identified and integrated in the patient's plan of care  Description: Interventions:  - What would you like us to know as we care for you? From home with wife  - Provide timely, complete, and accurate information to patient/family  - Incorporate patient and family knowledge, values, beliefs, and cultural backgrounds into the planning and delivery of care  - Encourage patient/family to participate in care and decision-making at the level they choose  - Honor patient and family perspectives and choices  Outcome: Progressing     Problem: Patient/Family Goals  Goal: Patient/Family Long Term Goal  Description: Patient's Long Term Goal: discharge from hospital    Interventions:  - monitor vital signs   - monitor appropriate labs  - pain management   - administer medications per order  - follow MD orders  - diagnostics per order  - update and inform patient and family on plan of care  - discharge planning  - See additional Care Plan goals for specific interventions  Outcome: Progressing  Goal: Patient/Family Short Term Goal  Description: Patient's Short Term Goal: improve incontinence     Interventions:   - monitor vital signs   - monitor appropriate labs  - pain management   - administer medications per order  - follow MD orders  - diagnostics per order  - update and inform patient and family on plan of care  - See additional Care Plan goals for specific interventions  Outcome: Progressing     Problem: SAFETY ADULT - FALL  Goal: Free from fall injury  Description: INTERVENTIONS:  - Assess pt frequently for physical needs  - Identify cognitive and physical deficits and behaviors that affect risk of falls.  - [place] fall precautions as indicated by assessment.  - Educate pt/family on patient safety including physical limitations  - Instruct pt to call for assistance with activity based on assessment  - Modify environment to reduce risk of injury  - Provide  assistive devices as appropriate  - Consider OT/PT consult to assist with strengthening/mobility  - Encourage toileting schedule  Outcome: Progressing     Problem: PAIN - ADULT  Goal: Verbalizes/displays adequate comfort level or patient's stated pain goal  Description: INTERVENTIONS:  - Encourage pt to monitor pain and request assistance  - Assess pain using appropriate pain scale  - Administer analgesics based on type and severity of pain and evaluate response  - Implement non-pharmacological measures as appropriate and evaluate response  - Consider cultural and social influences on pain and pain management  - Manage/alleviate anxiety  - Utilize distraction and/or relaxation techniques  - Monitor for opioid side effects  - Notify MD/LIP if interventions unsuccessful or patient reports new pain  - Anticipate increased pain with activity and pre-medicate as appropriate  Outcome: Progressing     Problem: DISCHARGE PLANNING  Goal: Discharge to home or other facility with appropriate resources  Description: INTERVENTIONS:  - Identify barriers to discharge w/pt and caregiver  - Include patient/family/discharge partner in discharge planning  - Arrange for needed discharge resources and transportation as appropriate  - Identify discharge learning needs (meds, wound care, etc)  - Arrange for interpreters to assist at discharge as needed  - Consider post-discharge preferences of patient/family/discharge partner  - Complete POLST form as appropriate  - Assess patient's ability to be responsible for managing their own health  - Refer to Case Management Department for coordinating discharge planning if the patient needs post-hospital services based on physician/LIP order or complex needs related to functional status, cognitive ability or social support system  Outcome: Progressing     Problem: CARDIOVASCULAR - ADULT  Goal: Maintains optimal cardiac output and hemodynamic stability  Description: INTERVENTIONS:  - Monitor vital  signs, rhythm, and trends  - Monitor for bleeding, hypotension and signs of decreased cardiac output  - Evaluate effectiveness of vasoactive medications to optimize hemodynamic stability  - Monitor arterial and/or venous puncture sites for bleeding and/or hematoma  - Assess quality of pulses, skin color and temperature  - Assess for signs of decreased coronary artery perfusion - ex. Angina  - Evaluate fluid balance, assess for edema, trend weights  Outcome: Progressing     Problem: SKIN/TISSUE INTEGRITY - ADULT  Goal: Skin integrity remains intact  Description: INTERVENTIONS  - Assess and document risk factors for pressure ulcer development  - Assess and document skin integrity  - Monitor for areas of redness and/or skin breakdown  - Initiate interventions, skin care algorithm/standards of care as needed  Outcome: Progressing     Problem: NEUROLOGICAL - ADULT  Goal: Achieves maximal functionality and self care  Description: INTERVENTIONS  - Monitor swallowing and airway patency with patient fatigue and changes in neurological status  - Encourage and assist patient to increase activity and self care with guidance from PT/OT  - Encourage visually impaired, hearing impaired and aphasic patients to use assistive/communication devices  Outcome: Progressing

## 2024-11-14 NOTE — DISCHARGE SUMMARY
General Medicine Discharge Summary     Patient ID:  Martha Garcia  59 year old  3/8/1965    Admit date: 11/11/2024    Discharge date and time: 11/14/2024    Attending Physician: Oswaldo Qureshi MD     Consults: IP CONSULT TO NEUROLOGY  IP CONSULT TO CARDIOLOGY  IP CONSULT TO SOCIAL WORK  IP CONSULT TO NEPHROLOGY    Primary Care Physician: Guerrero Borrero MD     Reason for admission: weakness     Risk For Readmission: moderate    Discharge Diagnoses: Syncope and collapse [R55]  Leg swelling [M79.89]  See Additional Discharge Diagnoses in Hospital Course    Discharged Condition: good    Follow-up with labs/images appointments:       Discharge Instructions         You need fu with your PCP in 1 week, for BP check and labs to check your kidney function.    You need to fu with nephrology in 1 week to monitor kidney function.    Please follow up with cardiology in 1-2 weeks    Please discuss with your PCP regarding follow up with GI and urology if your symptoms persist.    Please follow up with neurology in 1 month.       Discharge References/Attachments    Leg Swelling in a Single Leg (English)  Syncope, Causes of (English)           Exam  Gen: No acute distress  Pulm: Lungs clear, normal respiratory effort  CV: Heart with regular rate and rhythm  Abd: Abdomen soft,   EXT: no edema     HPI:   History of Present Illness: Mr. Garcia is a 59 year old male with PMH sig for Uncontrolled HTN, OA, chronic HFpEF, HLD, CAD s/p PCI of LAD, hs of stroke, who presents with bilateral leg swelling, urinary incontinence.  Patient is a poor historian, but states that for the past few day he has been intermittently incontinent of urine, at times noting his underwear and pants are soaked, and other times notes the urge but can't make it to the bathroom in time.  He denies burning or pain, no abd pain or back pain, no fevers or chills.  He also notes an episode while in the ER of incontinence of stool he went to check his pants and noted  stool in his underwear, this hasn't happened before, denies diarrhea.  He has no lower ext weakness, no pain in his spine, no other acute findings.  He did mention a 3 day episode of urinary incontinence last year this time that resolved on its own without intervention.   He also notes increasing lower ext swelling of bilateral lower ext with R>L over the last few months.  He denies CP or sob, no orthopnea, not other complaints.  In the ER he was getting up to walk to the bathroom felt lightheaded and dizzy and had a syncopal episode, denies CP or sob, denies syncope at home.       He notes being non-compliant with most of his medications, takes them once in a while, dose not follow a specific diet.         Hospital Course:   Mr. Garcia is a 59 year old male with PMH sig for Uncontrolled HTN, OA, chronic HFpEF, HLD, CAD s/p PCI of LAD, hs of stroke, and non-compliance with medications, who presents with bilateral leg swelling, urinary incontinence, and some stool incontinence, HTN Urgency, syncope, and leg swelling.  Patient was seen by Neuro, cardiology, nephrology, had MRI cervical/thoracic/lumbar spine, and MRI brain, without spinal cord injury, MRI brain showed old strokes, UA without infection, CT abd pelvis, with poss colitis, no diarrhea, no further episodes of incontinence.  BP improved after resuming home meds.  Clinically improved, symptoms resolved, ambulating well, Cleared by Cards, neuro, Nephrology and PT for DC home.  Patient has been non-compliant with medications at home, discussed extensively the importance of close medication adherence and fu with his doctors as an outpatient, patient understood this.  Recommended fu with PCP in 1 week, Cardiology and nephrology in 1-2 weeks, and neurology in 1 month.         Urinary and bowel incontinence  Dizziness / slurred speech   - notes 3-4 days of intermittent urinary incontinence PTA  - had episode of bowel incontinence in ER, stool also noted on underwear  during rectal exam  - denies saddle anesthesia   - rectal exam with normal rectal tone  - no lower ext weakness, no back pain   - admits to intermittent slurred speech for the last year (since his stroke in 2023) not worse on admission  - UA negative, no signs of infection  - CT abd pelvis, with poss colitis, no diarrhea or further BM while in hospital.  May need GI fu as outpatient, can discuss with PCP upon DC.    - Neurology consulted,   - Stat MRI, Lumbar spine MRI-> Lumbar spine without spinal cord impingement, MRI brain without acute stroke but multiple lacunar strokes  - EEG ordered, to rule out seizure activity -> no active seizure activity   - neuro evaluated and ordered C/T/L spine MRI with and without contrast -> showed no evidence of cord compromise  - symptoms resolved, if returns may need further work up including urology/GI eval  - ambulating well, cleared by PT for DC home with outpatient PT     Syncope  - trop negative  - no focal weakness  - tele continued  - check ECHO - > EF 50-55%, mild/mod aortic regurg,   - MRI as above-> without bleeding or stroke  - orthostatics negative     Bilateral leg swell  - venous doppler negative  - UA without proteinuria  - BNP normal  - venous insuff? Compression stocking  - improved     HTN urgency  - patient is admittedly non-compliant with his BP medications  - not consistent with any BP medications  - med rec reviewed will resume prior home med list  - much improved      Chronic HFpEF  - BNP normal  -  ECHO -> EF 50-55%, mild/mod aortic regurg,   - BP control     CAD  - hs of stent in 2020  - resume aspirin, statin  - check lipids -> uncontrolled , , not compliant   - BB resumed as well  - cardiology evaluated      CKD  - cre 1.65  - has 1.3-1.6  - likely due to uncontrolled HTN  - cre 1.7 prior to dc, renal called      TCP  - initial plts 141, now normal     HLD  - check lipids -> uncontrolled , , not compliant   - resume statin,  discussed importance of compliance      Hs of stroke  - MRI with chronic thalamic strokes   - asa, statin      Non compliance   - discussed at length with patient and wife, importance of close medication adherence and follow up given hs of CAD, CHF, Strokes, HTN  - stated his understanding       Discussed with with wife on admission, but called multiple times during admission, with no answer, calls were not returned.      Operative Procedures:      Imaging: CT ABDOMEN+PELVIS(CPT=74176)    Result Date: 11/13/2024  CONCLUSION:   Multifocal small bowel circumferential wall thickening with fat stranding in the surrounding mesentery in the left lower quadrant.  Circumferential wall thickening of the cecum and ascending colon also noted which in part could relate to lack of distension with some mild fat stranding adjacent to the cecum.  Overall these findings suggest an enterocolitis.  There is no evidence of pneumatosis or pneumoperitoneum.  Mild circumferential urinary bladder wall thickening also noted which in part could relate to lack of distension.  Correlation with urinalysis advised as a cystitis is not excluded.  Lesser incidental findings as above.     Dictated by (CST): Abhi Perkins MD on 11/13/2024 at 8:19 PM     Finalized by (CST): Abhi Perkins MD on 11/13/2024 at 8:25 PM          MRI SPINE LUMBAR (W+WO) (CPT=72158)    Result Date: 11/13/2024  CONCLUSION:  1. No acute lumbar spine fracture.  No suspicious postcontrast enhancement throughout the lumbar spine.  Conus medullaris and cauda equina nerve roots demonstrate an unremarkable MR appearance.  No evidence of a lumbar arterial venous malformation as clinically queried. 2. Mild multilevel lumbar spine degenerative changes as detailed.  Findings result in scattered mild neural foraminal stenoses at L3-L4 and L4-L5 as described. 3. Small degenerative Schmorl's node along the inferior L5 endplate with surrounding edema. 4. Additional mild linear  edema at the right L4 pedicle suggests degenerative stress response or sequelae of altered biomechanics. 5. Lesser incidental findings as above.   elm-remote  Dictated by (CST): Marcelo Ravi MD on 11/13/2024 at 12:33 PM     Finalized by (CST): Marcelo Ravi MD on 11/13/2024 at 12:38 PM          MRI SPINE THORACIC (W+WO) (CPT=72157)    Result Date: 11/13/2024  CONCLUSION:  1. No acute thoracic spine fracture. Normal caliber and signal characteristics of the thoracic spinal cord.  No suspicious postcontrast enhancement throughout the thoracic spine.  No definite MR manifestations of thoracic spine arteriovenous malformation  as clinically queried. 2. Multilevel thoracic spine degenerative changes as detailed.  Findings result in multilevel mild-to-moderate scattered neural foraminal stenoses (most pronounced on the left at T11-T12) as described.  No significant spinal canal stenosis throughout the  thoracic spine. 3. Enhancing 2.5 cm cutaneous/subcutaneous nodule in the midline posterior soft tissues at the T12 vertebral body level is unchanged since chest CT from 2020.  This likely relates to a sebaceous cyst or other dermatologic abnormality. 4. Lesser incidental findings as above.   elm-remote  Dictated by (CST): Marcelo Ravi MD on 11/13/2024 at 12:18 PM     Finalized by (CST): Marcelo Ravi MD on 11/13/2024 at 12:32 PM          MRI SPINE CERVICAL (W+WO) (CPT=72156)    Result Date: 11/13/2024  CONCLUSION:   Degenerative disc, facet, uncovertebral joint disease throughout the cervical spine, most prominent at C3 through C7 where there is moderate narrowing of the canal and bilateral neural foramen.  No gross cord edema or myelomalacia given limitations from motion artifact.     Dictated by (CST): Morgan Kim MD on 11/13/2024 at 11:55 AM     Finalized by (CST): Morgan Kim MD on 11/13/2024 at 11:58 AM          MRI SPINE LUMBAR (CPT=72148)    Result Date: 11/11/2024  CONCLUSION:  1. Mild -moderate  multilevel disc and facet degeneration.  No significant central narrowing.  No finding to account for the patient's symptoms. 2. A 2.8 x 1.7 cm subcutaneous mass posterior to the T12 level.  Findings may represent an epidermoid/sebaceous cyst which is unchanged from 02/12/2020 CT. 3. Distended urinary bladder.    Dictated by (CST): Wili Pennington MD on 11/11/2024 at 8:28 PM     Finalized by (CST): Wili Pennington MD on 11/11/2024 at 8:35 PM          MRI BRAIN (CPT=70551)    Result Date: 11/11/2024  CONCLUSION:   No evidence of acute infarction.  No evidence of acute intracranial abnormality.  Moderate chronic microvascular white matter ischemia.  Chronic thalamic and basal ganglia lacunar infarcts.  Chronic right cerebellar infarcts and small chronic left cerebellar infarct.  Chronic left pontine lacunar infarct.      Dictated by (CST): Abhi Perkins MD on 11/11/2024 at 6:32 PM     Finalized by (CST): Abhi Perkins MD on 11/11/2024 at 6:37 PM             Disposition: home    Activity: activity as tolerated  Diet: regular diet  Wound Care: as directed  Code Status: Full Code      Home Medication Changes:   Med list     Medication List        CHANGE how you take these medications      amLODIPine 5 MG Tabs  Commonly known as: Norvasc  Take 1 tablet (5 mg total) by mouth daily.  What changed:   medication strength  how much to take     cloNIDine 0.1 MG Tabs  Commonly known as: Catapres  Take 1 tablet (0.1 mg total) by mouth 2 (two) times daily.  What changed: when to take this            CONTINUE taking these medications      aspirin 81 MG Tbec  Take 1 tablet (81 mg total) by mouth daily.     atorvastatin 80 MG Tabs  Commonly known as: Lipitor  Take 1 tablet (80 mg total) by mouth nightly.     ezetimibe 10 MG Tabs  Commonly known as: Zetia     lisinopril 20 MG Tabs  Commonly known as: Prinivil; Zestril  Take 1 tablet (20 mg total) by mouth at bedtime.     metoprolol tartrate 100 MG Tabs  Commonly known as:  Lopressor  Take 1 tablet (100 mg total) by mouth 2x Daily(Beta Blocker).     spironolactone 25 MG Tabs  Commonly known as: Aldactone  Take 1 tablet (25 mg total) by mouth daily.            STOP taking these medications      meclizine 25 MG Tabs  Commonly known as: Antivert     metoclopramide 10 MG Tabs  Commonly known as: Reglan               Where to Get Your Medications        These medications were sent to [id number] - [place], [place] - [address] AT [address] [phone], [phone]  [address], [facility] [address]      Phone: [phone]   amLODIPine 5 MG Tabs  cloNIDine 0.1 MG Tabs         FU   Follow-up Information       Guerrero Borrero MD. Schedule an appointment as soon as possible for a visit in 1 week(s).    Specialty: Internal Medicine  Contact information:  [address] [facility]  [address]  [facility] [id number] [phone]               Sushant Rubin MD. Schedule an appointment as soon as possible for a visit in 1 week(s).    Specialty: UROLOGY  Contact information:  [address]  [address]  [facility] [address] [phone]               Magen Vogel MD. Schedule an appointment as soon as possible for a visit in 1 week(s).    Specialty: Interventional, Cardiology  Contact information:  [address]  [address]  [facility] [id number] [phone]               Consuelo Garrido NP Follow up.    Specialty: Nurse Practitioner  Why: Please call to schedule follow up with nephrology nurse practitioner within 1 week  Contact information:  [address]  [address]  [facility] [address] [phone]               Lloyd Andrews DO. Schedule an appointment as soon as possible for a visit in 1 month(s).    Specialty: NEUROLOGY  Contact information:  [address]  [facility] [id number] [phone]                             DC instructions:      Other Discharge Instructions:         You need fu with your PCP in 1 week, for BP check  and labs to check your kidney function.    You need to fu with nephrology in 1 week to monitor kidney function.    Please follow up with cardiology in 1-2 weeks    Please discuss with your PCP regarding follow up with GI and urology if your symptoms persist.    Please follow up with neurology in 1 month.         I reconciled current and discharge medications on day of discharge, discussed changes with patient and noted changes above.       Total Time Coordinating Care: Greater than 30 minutes    Patient had opportunity to ask questions and state understand and agree with therapeutic plan as outlined    Thank You,    Oswaldo Qureshi MD   Hospitalist with [facility]

## 2024-11-14 NOTE — PROGRESS NOTES
[facility]/  [facility]  part of [facility]    Progress Note    Martha Garcia  59 year old  [id number]  Oswaldo Qureshi MD Charles R Schwartz, MD      Assessment/Plan:  59 year old male presenting with:     1) LE edema/Knee pain  2) Syncope following IV lasix in ER  3) CAD s/p PCI 2020  4) HTN  5) HL  6) CVA  7) CKD  8) Noncompliance with medications and follow up     - ECG on admission with SR, PACs, lat TWI; trop negative, BNP wnl  - Suspect syncopal episode possibly orthostatic in the setting of IV lasix administration; MRI brain without acute findings  - TTE EF 50-55%  mild/mod AI   - Reduce amlodipine to 5mg daily (may be contributing to edema)  - Increase lisinopril to 40mg at bedtime  - Clonidine to 0.2mg bid to hopefully improve compliance (currently prescribed TID?)  - Cont metoprolol, spironolactone  - Cont, asa, statin  - Monitor on tele  - Will follow to F/U  w/ Dr Vogel D/W RN at bedside      Subjective:  No chest pain or shortness of breath.               Objective:  BP (!) 132/96 (BP Location: Left arm)   Pulse 73   Temp 98.5 °F (36.9 °C) (Oral)   Resp 20   Ht 188 cm (6' 2\")   Wt 266 lb 12.8 oz (121 kg)   SpO2 97%   BMI 34.26 kg/m²     Temp (24hrs), Av.3 °F (36.8 °C), Min:97.7 °F (36.5 °C), Max:98.7 °F (37.1 °C)      Intake/Output:    Intake/Output Summary (Last 24 hours) at 2024 1701  Last data filed at 2024 0620  Gross per 24 hour   Intake 610 ml   Output --   Net 610 ml       Wt Readings from Last 3 Encounters:   24 266 lb 12.8 oz (121 kg)   24 285 lb (129.3 kg)   23 275 lb (124.7 kg)       Allergies:  Allergies[1]    Physical Exam:  Blood pressure (!) 132/96, pulse 73, temperature 98.5 °F (36.9 °C), temperature source Oral, resp. rate 20, height 188 cm (6' 2\"), weight 266 lb 12.8 oz (121 kg), SpO2 97%.  General: Alert and oriented in no apparent distress.  HEENT: No focal deficits.  Neck: No JVD, carotids no bruits.  Cardiac:  Regular rate and rhythm, S1, S2 normal, no murmur, rub or gallop.  Lungs: Clear without wheezes, rales, rhonchi.     Abdomen: Soft, non-tender.   Extremities: Without clubbing, cyanosis or edema.  Peripheral pulses present  Neurologic: Alert and oriented, normal affect.  Skin: Warm and dry.   Psych: Appropriate               Laboratory/Data:  Diagnostics:       Echo 11/12/24  Conclusions:     1. Left ventricle: The cavity size was normal. Wall thickness was mildly      increased. Systolic function was normal. The estimated ejection fraction      was 50-55%, by biplane method of disks. Wall motion is normal; there are      no regional wall motion abnormalities. Unable to assess LV diastolic      function.   2. Right ventricle: The cavity size was normal. Systolic function was      normal.   3. Aortic valve: There was mild to moderate regurgitation directed      eccentrically in the LVOT.   4. Ascending aorta: The ascending aorta was 4.1cm diameter.   5. Mitral valve: There was mild regurgitation.   6. Tricuspid valve: There was mild regurgitation.   7. Pericardium, extracardiac: There was no pericardial effusion.       TELE: NSR     Labs:  Lab Results   Component Value Date    WBC 6.7 11/14/2024    HGB 13.8 11/14/2024    HCT 40.7 11/14/2024    MCV 81.2 11/14/2024    .0 11/14/2024     Lab Results   Component Value Date    INR 1.02 07/07/2023    INR 0.97 11/29/2019       Medications:    Current Facility-Administered Medications:     cloNIDine (Catapres) tab 0.2 mg, 0.2 mg, Oral, BID    lisinopril (Prinivil; Zestril) tab 40 mg, 40 mg, Oral, Nightly    amLODIPine (Norvasc) tab 5 mg, 5 mg, Oral, Daily    lidocaine-menthol 4-1 % patch 1 patch, 1 patch, Transdermal, Daily    traMADol (Ultram) tab 50 mg, 50 mg, Oral, Q12H PRN    HYDROcodone-acetaminophen (Norco)  MG per tab 1 tablet, 1 tablet, Oral, Q6H PRN    acetaminophen (Tylenol Extra Strength) tab 1,000 mg, 1,000 mg, Oral, Q6H PRN    melatonin tab 3 mg, 3  mg, Oral, Nightly PRN    polyethylene glycol (PEG 3350) (Miralax) 17 g oral packet 17 g, 17 g, Oral, Daily PRN    sennosides (Senokot) tab 17.2 mg, 17.2 mg, Oral, Nightly PRN    bisacodyl (Dulcolax) 10 MG rectal suppository 10 mg, 10 mg, Rectal, Daily PRN    ondansetron (Zofran) 4 MG/2ML injection 4 mg, 4 mg, Intravenous, Q6H PRN    hydrALAZINE (Apresoline) tab 25 mg, 25 mg, Oral, Q8H PRN    aspirin DR tab 81 mg, 81 mg, Oral, Daily    atorvastatin (Lipitor) tab 80 mg, 80 mg, Oral, Nightly    metoprolol tartrate (Lopressor) tab 100 mg, 100 mg, Oral, 2x Daily(Beta Blocker)    spironolactone (Aldactone) tab 25 mg, 25 mg, Oral, Daily    Anatoliy Martinez MD          [1] No Known Allergies

## 2024-11-15 NOTE — PAYOR COMM NOTE
--------------  DISCHARGE REVIEW    Payor: JOELLE RODRIGUEZ POS/MCNP  Subscriber #:  [id number]  Authorization Number: [id number]    Admit date: 11/13/24  Admit time:   2:33 PM  Discharge Date: 11/14/2024  6:23 PM     Admitting Physician: Oswaldo Qureshi MD  Attending Physician:  No att. providers found  Primary Care Physician: Guerrero Borrero MD      General Medicine Discharge Summary     Patient ID:  Martha Garcia  59 year old  3/8/1965    Admit date: 11/11/2024    Discharge date and time: 11/14/2024    Attending Physician: Oswaldo Qureshi MD     Consults: IP CONSULT TO NEUROLOGY  IP CONSULT TO CARDIOLOGY  IP CONSULT TO SOCIAL WORK  IP CONSULT TO NEPHROLOGY    Primary Care Physician: Guerrero Borrero MD     Reason for admission: weakness     Risk For Readmission: moderate    Discharge Diagnoses: Syncope and collapse [R55]  Leg swelling [M79.89]  See Additional Discharge Diagnoses in Hospital Course    Discharged Condition: good    Follow-up with labs/images appointments:       Discharge Instructions         You need fu with your PCP in 1 week, for BP check and labs to check your kidney function.    You need to fu with nephrology in 1 week to monitor kidney function.    Please follow up with cardiology in 1-2 weeks    Please discuss with your PCP regarding follow up with GI and urology if your symptoms persist.    Please follow up with neurology in 1 month.       Discharge References/Attachments    Leg Swelling in a Single Leg (English)  Syncope, Causes of (English)           Exam  Gen: No acute distress  Pulm: Lungs clear, normal respiratory effort  CV: Heart with regular rate and rhythm  Abd: Abdomen soft,   EXT: no edema     HPI:   History of Present Illness: Mr. Garcia is a 59 year old male with PMH sig for Uncontrolled HTN, OA, chronic HFpEF, HLD, CAD s/p PCI of LAD, hs of stroke, who presents with bilateral leg swelling, urinary incontinence.  Patient is a poor historian, but states that for the past few day he has been  intermittently incontinent of urine, at times noting his underwear and pants are soaked, and other times notes the urge but can't make it to the bathroom in time.  He denies burning or pain, no abd pain or back pain, no fevers or chills.  He also notes an episode while in the ER of incontinence of stool he went to check his pants and noted stool in his underwear, this hasn't happened before, denies diarrhea.  He has no lower ext weakness, no pain in his spine, no other acute findings.  He did mention a 3 day episode of urinary incontinence last year this time that resolved on its own without intervention.   He also notes increasing lower ext swelling of bilateral lower ext with R>L over the last few months.  He denies CP or sob, no orthopnea, not other complaints.  In the ER he was getting up to walk to the bathroom felt lightheaded and dizzy and had a syncopal episode, denies CP or sob, denies syncope at home.       He notes being non-compliant with most of his medications, takes them once in a while, dose not follow a specific diet.         Hospital Course:   Mr. Garcia is a 59 year old male with PMH sig for Uncontrolled HTN, OA, chronic HFpEF, HLD, CAD s/p PCI of LAD, hs of stroke, and non-compliance with medications, who presents with bilateral leg swelling, urinary incontinence, and some stool incontinence, HTN Urgency, syncope, and leg swelling.  Patient was seen by Neuro, cardiology, nephrology, had MRI cervical/thoracic/lumbar spine, and MRI brain, without spinal cord injury, MRI brain showed old strokes, UA without infection, CT abd pelvis, with poss colitis, no diarrhea, no further episodes of incontinence.  BP improved after resuming home meds.  Clinically improved, symptoms resolved, ambulating well, Cleared by Cards, neuro, Nephrology and PT for DC home.  Patient has been non-compliant with medications at home, discussed extensively the importance of close medication adherence and fu with his doctors as  an outpatient, patient understood this.  Recommended fu with PCP in 1 week, Cardiology and nephrology in 1-2 weeks, and neurology in 1 month.         Urinary and bowel incontinence  Dizziness / slurred speech   - notes 3-4 days of intermittent urinary incontinence PTA  - had episode of bowel incontinence in ER, stool also noted on underwear during rectal exam  - denies saddle anesthesia   - rectal exam with normal rectal tone  - no lower ext weakness, no back pain   - admits to intermittent slurred speech for the last year (since his stroke in 2023) not worse on admission  - UA negative, no signs of infection  - CT abd pelvis, with poss colitis, no diarrhea or further BM while in hospital.  May need GI fu as outpatient, can discuss with PCP upon DC.    - Neurology consulted,   - Stat MRI, Lumbar spine MRI-> Lumbar spine without spinal cord impingement, MRI brain without acute stroke but multiple lacunar strokes  - EEG ordered, to rule out seizure activity -> no active seizure activity   - neuro evaluated and ordered C/T/L spine MRI with and without contrast -> showed no evidence of cord compromise  - symptoms resolved, if returns may need further work up including urology/GI eval  - ambulating well, cleared by PT for DC home with outpatient PT     Syncope  - trop negative  - no focal weakness  - tele continued  - check ECHO - > EF 50-55%, mild/mod aortic regurg,   - MRI as above-> without bleeding or stroke  - orthostatics negative     Bilateral leg swell  - venous doppler negative  - UA without proteinuria  - BNP normal  - venous insuff? Compression stocking  - improved     HTN urgency  - patient is admittedly non-compliant with his BP medications  - not consistent with any BP medications  - med rec reviewed will resume prior home med list  - much improved      Chronic HFpEF  - BNP normal  -  ECHO -> EF 50-55%, mild/mod aortic regurg,   - BP control     CAD  - hs of stent in 2020  - resume aspirin, statin  - check  lipids -> uncontrolled , , not compliant   - BB resumed as well  - cardiology evaluated      CKD  - cre 1.65  - has 1.3-1.6  - likely due to uncontrolled HTN  - cre 1.7 prior to dc, renal called      TCP  - initial plts 141, now normal     HLD  - check lipids -> uncontrolled , , not compliant   - resume statin, discussed importance of compliance      Hs of stroke  - MRI with chronic thalamic strokes   - asa, statin      Non compliance   - discussed at length with patient and wife, importance of close medication adherence and follow up given hs of CAD, CHF, Strokes, HTN  - stated his understanding       Discussed with with wife on admission, but called multiple times during admission, with no answer, calls were not returned.      Operative Procedures:      Imaging: CT ABDOMEN+PELVIS(CPT=74176)    Result Date: 11/13/2024  CONCLUSION:   Multifocal small bowel circumferential wall thickening with fat stranding in the surrounding mesentery in the left lower quadrant.  Circumferential wall thickening of the cecum and ascending colon also noted which in part could relate to lack of distension with some mild fat stranding adjacent to the cecum.  Overall these findings suggest an enterocolitis.  There is no evidence of pneumatosis or pneumoperitoneum.  Mild circumferential urinary bladder wall thickening also noted which in part could relate to lack of distension.  Correlation with urinalysis advised as a cystitis is not excluded.  Lesser incidental findings as above.     Dictated by (CST): Abhi Perkins MD on 11/13/2024 at 8:19 PM     Finalized by (CST): Abhi Perkins MD on 11/13/2024 at 8:25 PM          MRI SPINE LUMBAR (W+WO) (CPT=72158)    Result Date: 11/13/2024  CONCLUSION:  1. No acute lumbar spine fracture.  No suspicious postcontrast enhancement throughout the lumbar spine.  Conus medullaris and cauda equina nerve roots demonstrate an unremarkable MR appearance.  No evidence of a  lumbar arterial venous malformation as clinically queried. 2. Mild multilevel lumbar spine degenerative changes as detailed.  Findings result in scattered mild neural foraminal stenoses at L3-L4 and L4-L5 as described. 3. Small degenerative Schmorl's node along the inferior L5 endplate with surrounding edema. 4. Additional mild linear edema at the right L4 pedicle suggests degenerative stress response or sequelae of altered biomechanics. 5. Lesser incidental findings as above.   elm-remote  Dictated by (CST): Marcelo Ravi MD on 11/13/2024 at 12:33 PM     Finalized by (CST): Marcelo Ravi MD on 11/13/2024 at 12:38 PM          MRI SPINE THORACIC (W+WO) (CPT=72157)    Result Date: 11/13/2024  CONCLUSION:  1. No acute thoracic spine fracture. Normal caliber and signal characteristics of the thoracic spinal cord.  No suspicious postcontrast enhancement throughout the thoracic spine.  No definite MR manifestations of thoracic spine arteriovenous malformation  as clinically queried. 2. Multilevel thoracic spine degenerative changes as detailed.  Findings result in multilevel mild-to-moderate scattered neural foraminal stenoses (most pronounced on the left at T11-T12) as described.  No significant spinal canal stenosis throughout the  thoracic spine. 3. Enhancing 2.5 cm cutaneous/subcutaneous nodule in the midline posterior soft tissues at the T12 vertebral body level is unchanged since chest CT from 2020.  This likely relates to a sebaceous cyst or other dermatologic abnormality. 4. Lesser incidental findings as above.   elm-remote  Dictated by (CST): Marcelo Ravi MD on 11/13/2024 at 12:18 PM     Finalized by (CST): Marcelo Ravi MD on 11/13/2024 at 12:32 PM          MRI SPINE CERVICAL (W+WO) (CPT=72156)    Result Date: 11/13/2024  CONCLUSION:   Degenerative disc, facet, uncovertebral joint disease throughout the cervical spine, most prominent at C3 through C7 where there is moderate narrowing of the canal and  bilateral neural foramen.  No gross cord edema or myelomalacia given limitations from motion artifact.     Dictated by (CST): Morgan Kim MD on 11/13/2024 at 11:55 AM     Finalized by (CST): Morgan Kim MD on 11/13/2024 at 11:58 AM          MRI SPINE LUMBAR (CPT=72148)    Result Date: 11/11/2024  CONCLUSION:  1. Mild -moderate multilevel disc and facet degeneration.  No significant central narrowing.  No finding to account for the patient's symptoms. 2. A 2.8 x 1.7 cm subcutaneous mass posterior to the T12 level.  Findings may represent an epidermoid/sebaceous cyst which is unchanged from 02/12/2020 CT. 3. Distended urinary bladder.    Dictated by (CST): Wili Pennington MD on 11/11/2024 at 8:28 PM     Finalized by (CST): Wili Pennington MD on 11/11/2024 at 8:35 PM          MRI BRAIN (CPT=70551)    Result Date: 11/11/2024  CONCLUSION:   No evidence of acute infarction.  No evidence of acute intracranial abnormality.  Moderate chronic microvascular white matter ischemia.  Chronic thalamic and basal ganglia lacunar infarcts.  Chronic right cerebellar infarcts and small chronic left cerebellar infarct.  Chronic left pontine lacunar infarct.      Dictated by (CST): Abhi Perkins MD on 11/11/2024 at 6:32 PM     Finalized by (CST): Abhi Perkins MD on 11/11/2024 at 6:37 PM             Disposition: home    Activity: activity as tolerated  Diet: regular diet  Wound Care: as directed  Code Status: Full Code      Home Medication Changes:   Med list     Medication List        CHANGE how you take these medications      amLODIPine 5 MG Tabs  Commonly known as: Norvasc  Take 1 tablet (5 mg total) by mouth daily.  What changed:   medication strength  how much to take     cloNIDine 0.1 MG Tabs  Commonly known as: Catapres  Take 1 tablet (0.1 mg total) by mouth 2 (two) times daily.  What changed: when to take this            CONTINUE taking these medications      aspirin 81 MG Tbec  Take 1 tablet (81 mg total) by mouth  daily.     atorvastatin 80 MG Tabs  Commonly known as: Lipitor  Take 1 tablet (80 mg total) by mouth nightly.     ezetimibe 10 MG Tabs  Commonly known as: Zetia     lisinopril 20 MG Tabs  Commonly known as: Prinivil; Zestril  Take 1 tablet (20 mg total) by mouth at bedtime.     metoprolol tartrate 100 MG Tabs  Commonly known as: Lopressor  Take 1 tablet (100 mg total) by mouth 2x Daily(Beta Blocker).     spironolactone 25 MG Tabs  Commonly known as: Aldactone  Take 1 tablet (25 mg total) by mouth daily.            STOP taking these medications      meclizine 25 MG Tabs  Commonly known as: Antivert     metoclopramide 10 MG Tabs  Commonly known as: Reglan               Where to Get Your Medications        These medications were sent to [id number] - [place], [place] - [address] AT [facility] [address] [phone], [phone]  [address], [facility] [address]      Phone: [phone]   amLODIPine 5 MG Tabs  cloNIDine 0.1 MG Tabs         FU   Follow-up Information       Guerrero Borrero MD. Schedule an appointment as soon as possible for a visit in 1 week(s).    Specialty: Internal Medicine  Contact information:  [address]  [address]  [facility] [id number] [phone]               Sushant Rubin MD. Schedule an appointment as soon as possible for a visit in 1 week(s).    Specialty: UROLOGY  Contact information:  [address]  [address]  [facility] [address] [phone]               Magen Vogel MD. Schedule an appointment as soon as possible for a visit in 1 week(s).    Specialty: Interventional, Cardiology  Contact information:  [address]  [address]  [facility] [id number] [phone]               Radhika, Consuelo R, NP Follow up.    Specialty: Nurse Practitioner  Why: Please call to schedule follow up with nephrology nurse practitioner within 1 week  Contact information:  [address]  [address]  [facility] [id number] [phone]                Lloyd Andrews, . Schedule an appointment as soon as possible for a visit in 1 month(s).    Specialty: NEUROLOGY  Contact information:  [address]  [facility] [address]  [phone]                             DC instructions:      Other Discharge Instructions:         You need fu with your PCP in 1 week, for BP check and labs to check your kidney function.    You need to fu with nephrology in 1 week to monitor kidney function.    Please follow up with cardiology in 1-2 weeks    Please discuss with your PCP regarding follow up with GI and urology if your symptoms persist.    Please follow up with neurology in 1 month.         I reconciled current and discharge medications on day of discharge, discussed changes with patient and noted changes above.       Total Time Coordinating Care: Greater than 30 minutes    Patient had opportunity to ask questions and state understand and agree with therapeutic plan as outlined    Thank You,    Oswaldo Qureshi MD   Hospitalist with [facility]         Electronically signed by Oswaldo Qureshi MD on 11/14/2024  4:00 PM         REVIEWER COMMENTS

## 2024-12-09 ENCOUNTER — HOSPITAL ENCOUNTER (EMERGENCY)
Facility: HOSPITAL | Age: 59
Discharge: HOME | End: 2024-12-09
Attending: EMERGENCY MEDICINE
Payer: COMMERCIAL

## 2024-12-09 VITALS
WEIGHT: 277 LBS | HEIGHT: 73 IN | DIASTOLIC BLOOD PRESSURE: 93 MMHG | OXYGEN SATURATION: 96 % | HEART RATE: 73 BPM | SYSTOLIC BLOOD PRESSURE: 160 MMHG | TEMPERATURE: 100 F | RESPIRATION RATE: 19 BRPM | BODY MASS INDEX: 36.71 KG/M2

## 2024-12-09 DIAGNOSIS — Z91.148 HISTORY OF MEDICATION NONCOMPLIANCE: ICD-10-CM

## 2024-12-09 DIAGNOSIS — I15.9 SECONDARY HYPERTENSION: Primary | ICD-10-CM

## 2024-12-09 DIAGNOSIS — N18.9 CHRONIC RENAL IMPAIRMENT, UNSPECIFIED CKD STAGE: ICD-10-CM

## 2024-12-09 LAB
ALBUMIN SERPL-MCNC: 4 G/DL (ref 3.2–4.8)
ALP LIVER SERPL-CCNC: 77 U/L
ALT SERPL-CCNC: 17 U/L
ANION GAP SERPL CALC-SCNC: 7 MMOL/L (ref 0–18)
AST SERPL-CCNC: 16 U/L (ref ?–34)
ATRIAL RATE: 77 BPM
BASOPHILS # BLD AUTO: 0.03 X10(3) UL (ref 0–0.2)
BASOPHILS NFR BLD AUTO: 0.6 %
BILIRUB DIRECT SERPL-MCNC: 0.2 MG/DL (ref ?–0.3)
BILIRUB SERPL-MCNC: 0.4 MG/DL (ref 0.2–1.1)
BUN BLD-MCNC: 16 MG/DL (ref 9–23)
BUN/CREAT SERPL: 9.8 (ref 10–20)
CALCIUM BLD-MCNC: 9.3 MG/DL (ref 8.7–10.4)
CHLORIDE SERPL-SCNC: 110 MMOL/L (ref 98–112)
CO2 SERPL-SCNC: 27 MMOL/L (ref 21–32)
CREAT BLD-MCNC: 1.63 MG/DL
DEPRECATED RDW RBC AUTO: 43.8 FL (ref 35.1–46.3)
EGFRCR SERPLBLD CKD-EPI 2021: 48 ML/MIN/1.73M2 (ref 60–?)
EOSINOPHIL # BLD AUTO: 0.1 X10(3) UL (ref 0–0.7)
EOSINOPHIL NFR BLD AUTO: 2.1 %
ERYTHROCYTE [DISTWIDTH] IN BLOOD BY AUTOMATED COUNT: 14.7 % (ref 11–15)
GLUCOSE BLD-MCNC: 145 MG/DL (ref 70–99)
HCT VFR BLD AUTO: 37.7 %
HGB BLD-MCNC: 12.7 G/DL
IMM GRANULOCYTES # BLD AUTO: 0.01 X10(3) UL (ref 0–1)
IMM GRANULOCYTES NFR BLD: 0.2 %
LYMPHOCYTES # BLD AUTO: 1.11 X10(3) UL (ref 1–4)
LYMPHOCYTES NFR BLD AUTO: 23.7 %
MCH RBC QN AUTO: 27.8 PG (ref 26–34)
MCHC RBC AUTO-ENTMCNC: 33.7 G/DL (ref 31–37)
MCV RBC AUTO: 82.5 FL
MONOCYTES # BLD AUTO: 0.53 X10(3) UL (ref 0.1–1)
MONOCYTES NFR BLD AUTO: 11.3 %
NEUTROPHILS # BLD AUTO: 2.9 X10 (3) UL (ref 1.5–7.7)
NEUTROPHILS # BLD AUTO: 2.9 X10(3) UL (ref 1.5–7.7)
NEUTROPHILS NFR BLD AUTO: 62.1 %
OSMOLALITY SERPL CALC.SUM OF ELEC: 302 MOSM/KG (ref 275–295)
P AXIS: 29 DEGREES
P-R INTERVAL: 180 MS
PLATELET # BLD AUTO: 134 10(3)UL (ref 150–450)
POTASSIUM SERPL-SCNC: 3.5 MMOL/L (ref 3.5–5.1)
PROT SERPL-MCNC: 6.2 G/DL (ref 5.7–8.2)
Q-T INTERVAL: 368 MS
QRS DURATION: 98 MS
QTC CALCULATION (BEZET): 416 MS
R AXIS: -6 DEGREES
RBC # BLD AUTO: 4.57 X10(6)UL
SODIUM SERPL-SCNC: 144 MMOL/L (ref 136–145)
T AXIS: -9 DEGREES
VENTRICULAR RATE: 77 BPM
WBC # BLD AUTO: 4.7 X10(3) UL (ref 4–11)

## 2024-12-09 PROCEDURE — 80048 BASIC METABOLIC PNL TOTAL CA: CPT | Performed by: EMERGENCY MEDICINE

## 2024-12-09 PROCEDURE — 93005 ELECTROCARDIOGRAM TRACING: CPT

## 2024-12-09 PROCEDURE — 93010 ELECTROCARDIOGRAM REPORT: CPT

## 2024-12-09 PROCEDURE — 36415 COLL VENOUS BLD VENIPUNCTURE: CPT

## 2024-12-09 PROCEDURE — 99284 EMERGENCY DEPT VISIT MOD MDM: CPT

## 2024-12-09 PROCEDURE — 85025 COMPLETE CBC W/AUTO DIFF WBC: CPT | Performed by: EMERGENCY MEDICINE

## 2024-12-09 PROCEDURE — 80076 HEPATIC FUNCTION PANEL: CPT | Performed by: EMERGENCY MEDICINE

## 2024-12-09 RX ORDER — CLONIDINE HYDROCHLORIDE 0.1 MG/1
0.1 TABLET ORAL ONCE
Status: COMPLETED | OUTPATIENT
Start: 2024-12-09 | End: 2024-12-09

## 2024-12-09 NOTE — ED INITIAL ASSESSMENT (HPI)
Pt arrives through triage with       complaints of elevated BP at home. Reports BP running high since earlier today. Denies HA, dizziness and or CP.  Reports taking his BP meds today  Denies thinners    Hx of HTN, HLD, NSTEMI, CVA

## 2024-12-09 NOTE — ED PROVIDER NOTES
Patient Seen in: [facility] Emergency Department      History     Chief Complaint   Patient presents with    Hypertension     Stated Complaint: hypertension    Subjective:   59-year-old male with hypertension, hyperlipidemia, stroke, CAD here from his doctor's office because he had elevated blood pressure readings and they wanted him evaluated in the ER.  He says he feels fine.  He does admit to not being compliant with his clonidine for the last 3 days because he says it makes him drowsy.  He states he does not know the names of all his blood pressure medications but did take his other morning meds today.  He has no weakness or numbness.  No chest pain or shortness of breath.  No hematuria.  No dizziness or slurred speech.  No cough or congestion.              Objective:     Past Medical History:    Coronary atherosclerosis    CVA (cerebral vascular accident) (HCC)    Essential hypertension    High blood pressure    Hyperlipidemia              Past Surgical History:   Procedure Laterality Date    Cath drug eluting stent  02/13/2020    PTCA KIMBERLY LAD with kissing balloon PTCA LAD/Diag                Social History     Socioeconomic History    Marital status:    Tobacco Use    Smoking status: Never    Smokeless tobacco: Never   Vaping Use    Vaping status: Never Used   Substance and Sexual Activity    Alcohol use: Yes     Comment: on rare occasions    Drug use: Never     Social Drivers of Health     Food Insecurity: No Food Insecurity (11/11/2024)    Food Insecurity     Food Insecurity: Never true   Transportation Needs: No Transportation Needs (11/11/2024)    Transportation Needs     Lack of Transportation: No   Housing Stability: Low Risk  (11/11/2024)    Housing Stability     Housing Instability: No                  Physical Exam     ED Triage Vitals [12/09/24 1509]   BP (!) 194/131   Pulse 82   Resp 18   Temp 99.6 °F (37.6 °C)   Temp src Temporal   SpO2 97 %   O2 Device None (Room air)       Current  Vitals:   Vital Signs  BP: (!) 183/125  Pulse: 80  Resp: 18  Temp: 99.6 °F (37.6 °C)  Temp src: Temporal  MAP (mmHg): (!) 144    Oxygen Therapy  SpO2: 97 %  O2 Device: None (Room air)        Physical Exam  Constitutional:       Comments: Blood pressure much improved to 169/109 in the room.   HENT:      Head: Normocephalic.      Right Ear: External ear normal.      Left Ear: External ear normal.      Nose: Nose normal.      Mouth/Throat:      Mouth: Mucous membranes are moist.   Eyes:      Extraocular Movements: Extraocular movements intact.   Cardiovascular:      Rate and Rhythm: Normal rate and regular rhythm.      Pulses: Normal pulses.      Heart sounds: Normal heart sounds.   Pulmonary:      Effort: Pulmonary effort is normal.      Breath sounds: Normal breath sounds.   Abdominal:      Palpations: Abdomen is soft.      Tenderness: There is no abdominal tenderness.   Musculoskeletal:         General: Normal range of motion.      Cervical back: Normal range of motion.   Skin:     General: Skin is warm.      Capillary Refill: Capillary refill takes less than 2 seconds.   Neurological:      General: No focal deficit present.      Mental Status: He is alert.      Motor: No weakness.             ED Course     Labs Reviewed   CBC WITH DIFFERENTIAL WITH PLATELET - Abnormal; Notable for the following components:       Result Value    HGB 12.7 (*)     HCT 37.7 (*)     .0 (*)     All other components within normal limits   BASIC METABOLIC PANEL (8) - Abnormal; Notable for the following components:    Glucose 145 (*)     Creatinine 1.63 (*)     BUN/CREA Ratio 9.8 (*)     Calculated Osmolality 302 (*)     eGFR-Cr 48 (*)     All other components within normal limits   HEPATIC FUNCTION PANEL (7) - Normal     EKG    Rate, intervals and axes as noted on EKG Report.  Rate: 77  Rhythm: Sinus Rhythm  Reading: Possible left atrial enlargement, LVH, poor anterior R wave progression with septal Q waves, no ST elevation.  QTc 4  1           ED Course as of 12/09/24 1701  ------------------------------------------------------------  Time: 12/09 1652  Comment: Labs independently interpreted by me.  CBC showed a minimal anemia, hepatic profile normal, BMP showed a creatinine 1.63 with a GFR of 48.  I have compared to old laboratory work and seems to be his baseline  ------------------------------------------------------------  Time: 12/09 1657  Comment: Diastolic is 104 at this time.  Systolic in the 160s.  Comfortable going home and says he does not need any prescriptions written              MDM      No results found.  Xrays not indicating,         Medical Decision Making  Noncompliant patient was at his doctor's office for checkup and was noted to have high blood pressure.  Could be from stopping the clonidine.  He has no symptoms and feels fine.  EKG showed sinus rhythm.  Pulse ox normal 97% room air.  Blood pressure improved without any treatment in the room.  I will order his clonidine and check some blood work but likely patient does not need to be admitted.    Amount and/or Complexity of Data Reviewed  External Data Reviewed: labs and notes.     Details: I reviewed the note from the nephrology office today.  I could not read the entire HPI but patient does have known chronic kidney disease and his blood pressure was uncontrolled there and Dr. Casillas wanted him evaluated in the ER.  He is on clonidine 0.13 times a day, lisinopril and amlodipine and spironolactone  Labs: ordered. Decision-making details documented in ED Course.  ECG/medicine tests: ordered and independent interpretation performed. Decision-making details documented in ED Course.    Risk  Prescription drug management.  Risk Details: Class 2 severe obesity due to excess calories with serious comorbidity and body mass index (BMI) of 38.0 to 38.9 in adult 11/13/2023    CKD (chronic kidney disease) stage 3, GFR 30-59 ml/min 03/10/2020    History of non-ST elevation  myocardial infarction (NSTEMI) 03/10/2020    Essential hypertension 03/10/2020    Hyperlipidemia, unspecified hyperlipidemia type 03/10/2020    Prediabetes 03/10/2020    History of stroke 03/10/2020    Coronary artery disease involving native coronary artery of native heart 03/10/2020    NSTEMI (non-ST elevated myocardial infarction) 02/13/2020    Thrombocytopenia 11/29/2019    Hyperglycemia 11/29/2019    Acute CVA (cerebrovascular accident) 11/29/2019    Non-STEMI (non-ST elevated myocardial infarction) 11/29/2019    Hypertensive emergency              Disposition and Plan     Clinical Impression:  1. Secondary hypertension    2. History of medication noncompliance    3. Chronic renal impairment, unspecified CKD stage         Disposition:  Discharge  12/9/2024  5:00 pm    Follow-up:  Guerrero Borrero MD  [address] [place] [id number]  [phone]    Follow up            Medications Prescribed:  Current Discharge Medication List              Supplementary Documentation:

## 2024-12-09 NOTE — DISCHARGE INSTRUCTIONS
Read instructions.  Make sure you take your medications as prescribed.  Follow-up with your primary to have your blood pressure rechecked in 2 days.  Return for changes or worsening.

## (undated) NOTE — LETTER
[facility]  [address] [address]  Dept: [phone]  Loc: [phone]      November 14, 2024    Patient: Martha Garcia   Date of Visit: 11/11/2024       To Whom It May Concern:    Martha Garcia was seen and treated on 11/11/2024-11/14/2024. He should not return to work until cleared by his primary care provider .    If you have any questions or concerns, please don't hesitate to call.      Encounter Provider(s):    Oswaldo Qureshi MD     5:11 PM  11/14/2024

## (undated) NOTE — LETTER
Date & Time: 11/11/2024, 3:08 PM  Patient: Martha Garcia  Encounter Provider(s):    Richi Voss MD       To Whom It May Concern:    Martha Garcia was seen and treated in our department on 11/11/2024. He should not return to work until 11/13/2024 .    If you have any questions or concerns, please do not hesitate to call.        _____________________________  Physician/APC Signature

## (undated) NOTE — ED AVS SNAPSHOT
Olga Ingram   MRN: [id number]    Department:  [facility] Department   Date of Visit:  4/6/2019           Disclosure     Insurance plans vary and the physician(s) referred by the ER may not be covered by your plan.  Please contact your CARE PHYSICIAN AT ONCE OR RETURN IMMEDIATELY TO THE EMERGENCY DEPARTMENT. If you have been prescribed any medication(s), please fill your prescription right away and begin taking the medication(s) as directed.   If you believe that any of the medications